# Patient Record
Sex: FEMALE | Race: OTHER | HISPANIC OR LATINO
[De-identification: names, ages, dates, MRNs, and addresses within clinical notes are randomized per-mention and may not be internally consistent; named-entity substitution may affect disease eponyms.]

---

## 2017-03-27 ENCOUNTER — APPOINTMENT (OUTPATIENT)
Dept: PULMONOLOGY | Facility: CLINIC | Age: 27
End: 2017-03-27

## 2017-03-27 VITALS
OXYGEN SATURATION: 98 % | WEIGHT: 110 LBS | DIASTOLIC BLOOD PRESSURE: 70 MMHG | SYSTOLIC BLOOD PRESSURE: 106 MMHG | RESPIRATION RATE: 18 BRPM | HEART RATE: 80 BPM | BODY MASS INDEX: 20.77 KG/M2 | HEIGHT: 61 IN

## 2017-03-27 DIAGNOSIS — Z83.3 FAMILY HISTORY OF DIABETES MELLITUS: ICD-10-CM

## 2017-03-27 DIAGNOSIS — Z87.09 PERSONAL HISTORY OF OTHER DISEASES OF THE RESPIRATORY SYSTEM: ICD-10-CM

## 2017-03-27 DIAGNOSIS — Z83.49 FAMILY HISTORY OF OTHER ENDOCRINE, NUTRITIONAL AND METABOLIC DISEASES: ICD-10-CM

## 2017-03-27 DIAGNOSIS — Z82.5 FAMILY HISTORY OF ASTHMA AND OTHER CHRONIC LOWER RESPIRATORY DISEASES: ICD-10-CM

## 2017-03-28 PROBLEM — Z83.3 FAMILY HISTORY OF DIABETES MELLITUS: Status: ACTIVE | Noted: 2017-03-28

## 2017-03-28 PROBLEM — Z87.09 HISTORY OF BRONCHITIS: Status: RESOLVED | Noted: 2017-03-28 | Resolved: 2017-03-28

## 2017-03-28 PROBLEM — Z82.5 FAMILY HISTORY OF ASTHMA: Status: ACTIVE | Noted: 2017-03-28

## 2017-03-28 PROBLEM — Z87.09 HISTORY OF ASTHMA: Status: RESOLVED | Noted: 2017-03-28 | Resolved: 2017-03-28

## 2017-03-28 PROBLEM — Z83.49 FAMILY HISTORY OF THYROID DISEASE: Status: ACTIVE | Noted: 2017-03-28

## 2017-03-28 RX ORDER — LEVALBUTEROL HYDROCHLORIDE 0.63 MG/3ML
0.63 SOLUTION RESPIRATORY (INHALATION)
Qty: 72 | Refills: 0 | Status: COMPLETED | COMMUNITY
Start: 2016-11-10

## 2017-03-28 RX ORDER — TRETINOIN 0.25 MG/G
0.03 CREAM TOPICAL
Qty: 45 | Refills: 0 | Status: COMPLETED | COMMUNITY
Start: 2016-04-15

## 2017-03-28 RX ORDER — PREDNISONE 10 MG/1
10 TABLET ORAL
Qty: 7 | Refills: 0 | Status: COMPLETED | COMMUNITY
Start: 2016-11-02

## 2017-03-28 RX ORDER — ACETAMINOPHEN AND CODEINE PHOSPHATE 120; 12 MG/5ML; MG/5ML
120-12 SOLUTION ORAL
Qty: 150 | Refills: 0 | Status: COMPLETED | COMMUNITY
Start: 2016-11-10

## 2017-03-28 RX ORDER — AZITHROMYCIN 250 MG/1
250 TABLET, FILM COATED ORAL
Qty: 6 | Refills: 0 | Status: COMPLETED | COMMUNITY
Start: 2017-01-03

## 2017-03-28 RX ORDER — BENZONATATE 200 MG/1
200 CAPSULE ORAL
Qty: 30 | Refills: 0 | Status: COMPLETED | COMMUNITY
Start: 2016-11-10

## 2017-03-28 RX ORDER — PREDNISONE 10 MG/1
10 TABLET ORAL DAILY
Qty: 100 | Refills: 3 | Status: COMPLETED | COMMUNITY
Start: 2017-03-27 | End: 2017-03-28

## 2017-03-28 RX ORDER — LEVALBUTEROL TARTRATE 45 UG/1
45 AEROSOL, METERED ORAL
Qty: 15 | Refills: 0 | Status: COMPLETED | COMMUNITY
Start: 2016-01-12

## 2017-03-28 RX ORDER — BENZONATATE 100 MG/1
100 CAPSULE ORAL
Qty: 21 | Refills: 0 | Status: COMPLETED | COMMUNITY
Start: 2017-01-03

## 2017-04-08 RX ORDER — ETONOGESTREL AND ETHINYL ESTRADIOL .12; .015 MG/D; MG/D
0.12-0.015 INSERT, EXTENDED RELEASE VAGINAL
Qty: 1 | Refills: 0 | Status: DISCONTINUED | COMMUNITY
Start: 2016-09-15

## 2017-04-10 ENCOUNTER — APPOINTMENT (OUTPATIENT)
Dept: PULMONOLOGY | Facility: CLINIC | Age: 27
End: 2017-04-10
Payer: COMMERCIAL

## 2017-04-10 VITALS
HEIGHT: 61 IN | WEIGHT: 107 LBS | SYSTOLIC BLOOD PRESSURE: 102 MMHG | RESPIRATION RATE: 16 BRPM | BODY MASS INDEX: 20.2 KG/M2 | DIASTOLIC BLOOD PRESSURE: 72 MMHG | OXYGEN SATURATION: 95 % | HEART RATE: 98 BPM

## 2017-04-10 PROCEDURE — 99214 OFFICE O/P EST MOD 30 MIN: CPT

## 2017-06-07 ENCOUNTER — RX RENEWAL (OUTPATIENT)
Age: 27
End: 2017-06-07

## 2017-09-27 ENCOUNTER — RX RENEWAL (OUTPATIENT)
Age: 27
End: 2017-09-27

## 2017-09-27 DIAGNOSIS — Z87.898 PERSONAL HISTORY OF OTHER SPECIFIED CONDITIONS: ICD-10-CM

## 2017-11-06 ENCOUNTER — RX RENEWAL (OUTPATIENT)
Age: 27
End: 2017-11-06

## 2017-11-06 DIAGNOSIS — L70.9 ACNE, UNSPECIFIED: ICD-10-CM

## 2017-12-02 DIAGNOSIS — H60.90 UNSPECIFIED OTITIS EXTERNA, UNSPECIFIED EAR: ICD-10-CM

## 2018-01-07 RX ORDER — HYDROCODONE BITARTRATE AND HOMATROPINE METHYLBROMIDE 5; 1.5 MG/5ML; MG/5ML
5-1.5 SYRUP ORAL EVERY 4 HOURS
Qty: 200 | Refills: 0 | Status: DISCONTINUED | COMMUNITY
Start: 2017-04-10 | End: 2018-01-07

## 2018-03-19 ENCOUNTER — APPOINTMENT (OUTPATIENT)
Dept: PULMONOLOGY | Facility: CLINIC | Age: 28
End: 2018-03-19
Payer: COMMERCIAL

## 2018-03-19 VITALS
BODY MASS INDEX: 20.96 KG/M2 | WEIGHT: 111 LBS | RESPIRATION RATE: 16 BRPM | OXYGEN SATURATION: 99 % | HEIGHT: 61 IN | SYSTOLIC BLOOD PRESSURE: 116 MMHG | DIASTOLIC BLOOD PRESSURE: 72 MMHG

## 2018-03-19 PROCEDURE — 94729 DIFFUSING CAPACITY: CPT

## 2018-03-19 PROCEDURE — 99214 OFFICE O/P EST MOD 30 MIN: CPT | Mod: 25

## 2018-03-19 PROCEDURE — 94727 GAS DIL/WSHOT DETER LNG VOL: CPT

## 2018-03-19 PROCEDURE — 94060 EVALUATION OF WHEEZING: CPT

## 2018-03-19 RX ORDER — PREDNISONE 10 MG/1
10 TABLET ORAL DAILY
Qty: 100 | Refills: 3 | Status: COMPLETED | COMMUNITY
Start: 2017-04-10 | End: 2018-03-19

## 2018-03-19 RX ORDER — CIPROFLOXACIN AND DEXAMETHASONE 3; 1 MG/ML; MG/ML
0.3-0.1 SUSPENSION/ DROPS AURICULAR (OTIC) TWICE DAILY
Qty: 1 | Refills: 0 | Status: COMPLETED | COMMUNITY
Start: 2017-12-02 | End: 2018-03-19

## 2018-08-16 ENCOUNTER — APPOINTMENT (OUTPATIENT)
Dept: PULMONOLOGY | Facility: CLINIC | Age: 28
End: 2018-08-16
Payer: COMMERCIAL

## 2018-08-16 VITALS
BODY MASS INDEX: 20.03 KG/M2 | OXYGEN SATURATION: 97 % | HEART RATE: 74 BPM | RESPIRATION RATE: 14 BRPM | SYSTOLIC BLOOD PRESSURE: 110 MMHG | TEMPERATURE: 98.7 F | DIASTOLIC BLOOD PRESSURE: 70 MMHG | WEIGHT: 106 LBS

## 2018-08-16 PROCEDURE — 99213 OFFICE O/P EST LOW 20 MIN: CPT

## 2018-08-16 RX ORDER — DAPSONE 50 MG/G
5 GEL TOPICAL
Qty: 60 | Refills: 0 | Status: COMPLETED | COMMUNITY
Start: 2018-04-04

## 2018-08-16 RX ORDER — ALBUTEROL SULFATE 2.5 MG/3ML
(2.5 MG/3ML) SOLUTION RESPIRATORY (INHALATION)
Qty: 75 | Refills: 0 | Status: DISCONTINUED | COMMUNITY
Start: 2017-01-03 | End: 2018-08-16

## 2018-08-16 RX ORDER — DESONIDE 0.5 MG/G
0.05 CREAM TOPICAL
Qty: 60 | Refills: 0 | Status: COMPLETED | COMMUNITY
Start: 2018-04-04

## 2018-10-01 ENCOUNTER — RX RENEWAL (OUTPATIENT)
Age: 28
End: 2018-10-01

## 2018-11-06 ENCOUNTER — APPOINTMENT (OUTPATIENT)
Dept: PULMONOLOGY | Facility: CLINIC | Age: 28
End: 2018-11-06
Payer: COMMERCIAL

## 2018-11-06 PROCEDURE — 36415 COLL VENOUS BLD VENIPUNCTURE: CPT

## 2018-11-06 RX ORDER — CIPROFLOXACIN AND DEXAMETHASONE 3; 1 MG/ML; MG/ML
0.3-0.1 SUSPENSION/ DROPS AURICULAR (OTIC) TWICE DAILY
Qty: 1 | Refills: 2 | Status: DISCONTINUED | COMMUNITY
Start: 2018-09-08 | End: 2018-11-06

## 2018-11-07 LAB
HAV IGM SER QL: NONREACTIVE
HBV SURFACE AG SER QL: NONREACTIVE
HCV AB SER QL: NONREACTIVE
HCV S/CO RATIO: 0.15 S/CO

## 2018-11-08 LAB
MEV IGM SER QL: NEGATIVE
RUBV IGM FLD-ACNC: <20 AU/ML
VZV IGM SER IF-ACNC: <0.91 INDEX

## 2018-11-11 LAB — MUV IGM SER QL IA: <0.8 AU

## 2018-12-16 ENCOUNTER — RX RENEWAL (OUTPATIENT)
Age: 28
End: 2018-12-16

## 2018-12-20 ENCOUNTER — RX RENEWAL (OUTPATIENT)
Age: 28
End: 2018-12-20

## 2019-02-04 ENCOUNTER — OTHER (OUTPATIENT)
Age: 29
End: 2019-02-04

## 2019-02-04 VITALS
DIASTOLIC BLOOD PRESSURE: 64 MMHG | OXYGEN SATURATION: 97 % | HEART RATE: 76 BPM | TEMPERATURE: 98.3 F | SYSTOLIC BLOOD PRESSURE: 114 MMHG

## 2019-05-13 ENCOUNTER — CLINICAL ADVICE (OUTPATIENT)
Age: 29
End: 2019-05-13

## 2019-05-13 DIAGNOSIS — J32.9 CHRONIC SINUSITIS, UNSPECIFIED: ICD-10-CM

## 2019-05-24 ENCOUNTER — RX RENEWAL (OUTPATIENT)
Age: 29
End: 2019-05-24

## 2019-06-10 ENCOUNTER — APPOINTMENT (OUTPATIENT)
Dept: PULMONOLOGY | Facility: CLINIC | Age: 29
End: 2019-06-10
Payer: COMMERCIAL

## 2019-06-10 VITALS
BODY MASS INDEX: 20.78 KG/M2 | TEMPERATURE: 98.1 F | RESPIRATION RATE: 12 BRPM | WEIGHT: 110 LBS | DIASTOLIC BLOOD PRESSURE: 72 MMHG | SYSTOLIC BLOOD PRESSURE: 110 MMHG | HEART RATE: 80 BPM | OXYGEN SATURATION: 99 %

## 2019-06-10 PROCEDURE — 99214 OFFICE O/P EST MOD 30 MIN: CPT

## 2019-06-10 NOTE — DISCUSSION/SUMMARY
[FreeTextEntry1] : 29 y/o woman with h/o asthma which is not active now. \par \par Was hospitalized at [facility] on June 8, 2019 for fever, nausea, vomiting and RLQ abdominal pain. CT showed diverticulosis. She also had NEHA which resolved with hydration. Presumed to have diverticulitis although this was not specifically noted on CT of the abdomen. Treated with  broad spectrum IV antibiotics. \par \par Now c/o pain in her throat and painful swallowing. Has marked pharyngeal erythema on examination. Appears to have early thrush. Given clotrimazole oral lozenges. Blood work to be repeated later this week. To see GI as well.  
No

## 2019-06-10 NOTE — REVIEW OF SYSTEMS
[Fatigue] : fatigue [Poor Appetite] : poor appetite [Nasal Congestion] : nasal congestion [Sore Throat] : sore throat [Headache] : headache [Difficulty Maintaining Sleep] : difficulty maintaining sleep [Dysphagia] : dysphagia [Fever] : no fever [Chills] : no chills [Postnasal Drip] : no postnasal drip [Epistaxis] : no nosebleeds [Ear Disturbance] : no ear disturbance [Sputum] : not coughing up ~M sputum [Cough] : no cough [Dyspnea] : no dyspnea [Wheezing] : no wheezing [Chest Discomfort] : no chest discomfort [Edema] : ~T edema was not present [Nasal Discharge] : no nasal discharge [Reflux] : no reflux [Nausea] : no nausea [Vomiting] : no vomiting [Diarrhea] : no diarrhea [Myalgias] : no myalgias [Rash] : no [unfilled] rash [Anemia] : no anemia [Dizziness] : no dizziness [Depression] : no depression [Confusion] : no confusion [Syncope] : no fainting [Thyroid Problem] : no thyroid problem [Diabetes] : no diabetes mellitus [DVT] : no DVT [Difficulty Initiating Sleep] : no difficulty falling asleep

## 2019-06-10 NOTE — PHYSICAL EXAM
Mother reports father was giving patient a bath earlier this evening when patient swallowed an undetermined amount of water. Mother reports father told her pt appeared to be hiccupping and gasping for air after he swallowed the water. Mother denies vomiting or changes in behavior, reports pt is tired but acting normally.    Awake, alert and aware of environment with age appropriate behavior.No acute distress noted. Skin is warm and dry with normal color. Airway is open and patent, respirations are spontaneous, unlabored with normal rate and effort.Abdomen is soft and non distended. Patient is moving all extremities spontaneously. . No obvious musculoskeletal deformities noted.     [General Appearance - Well Developed] : well developed [Normal Appearance] : normal appearance [General Appearance - In No Acute Distress] : no acute distress [Heart Sounds] : normal S1 and S2 [Auscultation Breath Sounds / Voice Sounds] : lungs were clear to auscultation bilaterally [Cyanosis, Localized] : no localized cyanosis [No Focal Deficits] : no focal deficits [] : no rash [Skin Turgor] : normal skin turgor [Oriented To Time, Place, And Person] : oriented to person, place, and time [Impaired Insight] : insight and judgment were intact [Affect] : the affect was normal [Mood] : the mood was normal [Neck Cervical Mass (___cm)] : no neck mass was observed [Abdomen Soft] : soft [Abdomen Tenderness] : non-tender [Bowel Sounds] : normal bowel sounds [Abnormal Walk] : normal gait [FreeTextEntry1] : No cervical adenopathy.

## 2019-06-10 NOTE — HISTORY OF PRESENT ILLNESS
[FreeTextEntry1] : She was admitted to [facility] last week for fever, RLQ pain, nausea, vomiting and loose BM's, Was noted to have NEHA.  Presumed to have diverticulitis or possible food poisoning.  Was treated with antibiotics and IV fluids. \par \par C/O pain in her throat and painful swallowing for the past two days. No fever. No neck pain. \par

## 2019-06-11 ENCOUNTER — LABORATORY RESULT (OUTPATIENT)
Age: 29
End: 2019-06-11

## 2019-06-11 ENCOUNTER — APPOINTMENT (OUTPATIENT)
Dept: INTERNAL MEDICINE | Facility: CLINIC | Age: 29
End: 2019-06-11
Payer: COMMERCIAL

## 2019-06-11 VITALS
DIASTOLIC BLOOD PRESSURE: 69 MMHG | BODY MASS INDEX: 19.63 KG/M2 | OXYGEN SATURATION: 97 % | HEART RATE: 76 BPM | WEIGHT: 104 LBS | HEIGHT: 61 IN | SYSTOLIC BLOOD PRESSURE: 114 MMHG | TEMPERATURE: 98 F

## 2019-06-11 DIAGNOSIS — B37.0 CANDIDAL STOMATITIS: ICD-10-CM

## 2019-06-11 DIAGNOSIS — Z92.89 PERSONAL HISTORY OF OTHER MEDICAL TREATMENT: ICD-10-CM

## 2019-06-11 DIAGNOSIS — Z80.7 FAMILY HISTORY OF OTHER MALIGNANT NEOPLASMS OF LYMPHOID, HEMATOPOIETIC AND RELATED TISSUES: ICD-10-CM

## 2019-06-11 PROCEDURE — 99204 OFFICE O/P NEW MOD 45 MIN: CPT

## 2019-06-11 RX ORDER — METOCLOPRAMIDE 10 MG/1
10 TABLET ORAL
Qty: 60 | Refills: 0 | Status: COMPLETED | COMMUNITY
Start: 2019-06-04 | End: 2019-06-11

## 2019-06-11 RX ORDER — SCOPOLAMINE 1.5 MG/1
1 PATCH, EXTENDED RELEASE TRANSDERMAL
Qty: 4 | Refills: 2 | Status: COMPLETED | COMMUNITY
Start: 2017-09-27 | End: 2019-06-11

## 2019-06-11 RX ORDER — TRETINOIN 0.5 MG/G
0.05 CREAM TOPICAL DAILY
Qty: 1 | Refills: 1 | Status: COMPLETED | COMMUNITY
Start: 2017-11-06 | End: 2019-06-11

## 2019-06-11 NOTE — HISTORY OF PRESENT ILLNESS
[Nausea] : denies nausea [Heartburn] : denies heartburn [Constipation] : denies constipation [Vomiting] : denies vomiting [Yellow Skin Or Eyes (Jaundice)] : denies jaundice [Abdominal Swelling] : denies abdominal swelling [Rectal Pain] : denies rectal pain [Wt Loss ___ Lbs] : recent [unfilled] ~Upound(s) weight loss [Abdominal Pain] : abdominal pain [Diarrhea] : diarrhea [Diverticulitis] : diverticulitis [Good Compliance] : good compliance with treatment [Wt Gain ___ Lbs] : no recent weight gain [GERD] : no gastroesophageal reflux disease [Peptic Ulcer Disease] : no peptic ulcer disease [Hiatus Hernia] : no hiatus hernia [Kidney Stone] : no kidney stone [Pancreatitis] : no pancreatitis [Cholelithiasis] : no cholelithiasis [Alcohol Abuse] : no alcohol abuse [Irritable Bowel Syndrome] : no irritable bowel syndrome [Inflammatory Bowel Disease] : no inflammatory bowel disease [Abdominal Surgery] : no abdominal surgery [Malignancy] : no malignancy [Appendectomy] : no appendectomy [Cholecystectomy] : no cholecystectomy [de-identified] : Pt is a 28 yr old woman who was recently admitted to hospital due to acute diverticulitis and acute kidney injury due to  dehydration.   Pt states she went to er due to right lower quadrant pain  on sunday and when it didn’t resolved and became worse she went to er.  She had ct of abd and pelvis  and found to have  liver upper limits of normal and possible sludge in gallbladder and mild bladder wall thickening  minimal haziness of fat anteriorly in mid portion of pelvis  and minimal thickening of fascial planes along posterior aspect of midportion of right paracolic gutter  findings likely inflammatory  possible diverticulitis  or gyn origin,.  such as ruptured ovarian cyst.  pid.   she has sigmoid diverticulosis  moderate and scattered through colon.  She has subpleural nodular opacities at lung bases  largest 3-4mm  she had pelvic sonogram and fluid in [place] she was admitted and stayed for 4 days and was given iv anti biotics and discharged on amoxicillin , flagyl protonix and zofran and when she went to her pcp  she was found to have thrush and  given troches Mycostatin .  She is still on antibiotics.  She doesn’t have abd pain today.  She has watery bowel movement and pasty but better than what it was  when she first went to hospital  She states she developed abd pain first and watery diarrhea  and continued to have diarrhea  non bloody and would have a bm after eating  5 times a day and then it became like water  and started to vomit. she states she has not been on antibiotics prior to this .  Its been over one year.  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  She never was tested for  stool.. She doesn’t have fever or chills.  she has not recently traveled outside the [place].   no other person was ill in her home who ate same food. She did eat cheese which no other person ate.

## 2019-06-11 NOTE — CONSULT LETTER
[Dear  ___] : Dear  [unfilled], [Consult Letter:] : I had the pleasure of evaluating your patient, [unfilled]. [Please see my note below.] : Please see my note below. [( Thank you for referring [unfilled] for consultation for _____ )] : Thank you for referring [unfilled] for consultation for [unfilled] [Consult Closing:] : Thank you very much for allowing me to participate in the care of this patient.  If you have any questions, please do not hesitate to contact me. [Sincerely,] : Sincerely, [FreeTextEntry3] : Gaurav Ross MDFACP

## 2019-06-11 NOTE — ASSESSMENT
[FreeTextEntry1] : diarrhea She had sudden episode of diarrhea and then vomiting and developed laurie due to dehydration.  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  she states her dad had hpylori and was treated and didn’t develop diarrhea  and although hpylori can cause colitis it doesn’t cause an acute episode of diarrhea . She is on antibiotics presently  and it will not be tested for .  She will start probiotic now and take at a different time with her antibiotic.  She developed this due to her  antibiotics.  she was placed on protonix  along with antibiotics can increase her risks of c diff if she is negative now.   I would stoop it especially with her renal insuff and acute injury and was given due to her vomiting.  I will start her on vsl #3 for her diarrhea.  Although ct didn’t show evidence of colitis   Crohns and uc could be considered but again unlikely due to the acuteness of her illness.  Viral gastroenteritis is also considered. thrush she will continue troches  lung nodules were seen on abd ct and should be followed and investigated further and will leave this to her pcp.   Aisha will follow a low residue diet for now. and will be given diet.  she is scheduled for colonoscopy on 9/6/19 when her inflammation should be resolved if it was due to diverticulitis.

## 2019-06-11 NOTE — OB HISTORY
[Definite:  ___ (Date)] : the last menstrual period was [unfilled] [Regular Cycle Intervals] : periods have been regular [___] : Total Pregnancies: [unfilled] [Menarche Age: ____] : age at menarche was [unfilled]

## 2019-06-11 NOTE — PHYSICAL EXAM
[Well Developed] : well developed [Well Nourished] : well nourished [Conjunctiva] : the conjunctiva were normal in both eyes [EOM Intact] : extraocular movements were intact [PERRL] : pupils were equal in size, round, and reactive to light [Normal Right Eye] : Right eye: normal [Normal Left Eye] : Left eye: normal [Normal Right Ear] : Right ear: the external ear, canal and tympanic membrane were normal [Normal Nose] : Nose: the external nose, nasal mucosa, and septum were normal [Normal Left Ear] : Left ear: the external ear, canal and tympanic membrane were normal [Normal Oropharynx] : Oropharynx: the oral mucosa, hard and soft palates, tongue, tonsils, and posterior pharynx were normal [Normal Lips/Teeth/Gums] : Lips, teeth and gums were normal [Normal Nasopharynx] : Nasopharynx: the nasal turbinates, mucosa, adenoids, eustachian tubes were normal [Normal Neck] : Neck: Supple, no masses or thyromegaly [Normal Larynx] : Larynx: the epiglottis and vocal cords were normal [Neck Supple] : was supple [Normal Appearance] : was normal in appearance [Rate ___] : at [unfilled] breaths per minute [Normal Rhythm/Effort] : normal respiratory rhythm and effort [Clear Bilaterally] : the lungs were clear to auscultation bilaterally [Normal to Percussion] : the lungs were normal to percussion [Normal Rate] : normal [5th Left ICS - MCL] : palpated at the 5th LICS in the midclavicular line [Normal S2] : normal S2 [Normal S1] : normal S1 [II] : a grade 2 [No Pitting Edema] : no pitting edema present [No Abnormalities] : the abdominal aorta was not enlarged and no bruit was heard [2+] : right 2+ [Examination Of The Breasts] : a normal appearance [Flat] : flat [Soft, Nontender] : the abdomen was soft and nontender [No HSM] : no hepatosplenomegaly noted [No Mass] : no masses were palpated [None] : no hernias were palpable [Normal Kyphosis] : normal kyphosis [No Visual Abnormalities] : no visible abnormalities [Normal Lordosis] : normal lordosis [No Scoliosis] : no scoliosis [No Tenderness to Palpation] : no spine tenderness on palpation [No Masses] : no masses [Full ROM] : full ROM [No Pain with ROM] : no pain with motion in any direction [Intact] : all reflexes within normal limits bilaterally [Normal Station and Gait] : the gait and station were normal [Normal Motor Tone] : the muscle tone was normal [Involuntary Movements] : no involuntary movements were seen [Normal Scalp] : inspection of the scalp showed no abnormalities [Examination Of The Hair] : texture and distribution of hair was normal [Complexion Medium] : medium complexion [Normal] : the deep tendon reflexes were normal [Appropriate] : appropriate [Normal Mental Status] : the patient's orientation, memory, attention, language and fund of knowledge were normal [Enlarged Diffusely] : was not enlarged [S3] : no S3 [S4] : no S4 [Rt] : no varicose veins of the right leg [Lt] : no varicose veins of the left leg [Right Carotid Bruit] : no bruit heard over the right carotid [Right Femoral Bruit] : no bruit heard over the right femoral artery [Left Carotid Bruit] : no bruit heard over the left carotid [Left Femoral Bruit] : no bruit heard over the left femoral artery [FreeTextEntry1] : examined sitting and supine.   [Bruit] : no bruit heard [Rebound] : no rebound [Guarding] : no guarding [Postauricular Lymph Nodes Enlarged Bilaterally] : nodes not enlarged [Suboccipital Lymph Nodes Enlarged Bilaterally] : nodes not enlarged [Preauricular Lymph Nodes Enlarged Bilaterally] : nodes not enlarged [Submandibular Lymph Nodes Enlarged Bilaterally] : nodes not enlarged [Supraclavicular Lymph Nodes Enlarged Bilaterally] : nodes not enlarged [Cervical Lymph Nodes Enlarged Anterior Bilaterally] : nodes not enlarged [Submental Lymph Nodes Enlarged] : nodes not enlarged [Cervical Lymph Nodes Enlarged Posterior Bilaterally] : nodes not enlarged [Axillary Lymph Nodes Enlarged Bilaterally] : nodes not enlarged [Epitrochlear Lymph Nodes Enlarged Bilaterally] : nodes not enlarged [Femoral Lymph Nodes Enlarged Bilaterally] : nodes not enlarged [Abnormal Color] : normal color and pigmentation [Inguinal Lymph Nodes Enlarged Bilaterally] : nodes not enlarged [Skin Turgor Decreased] : normal skin turgor [Impaired judgment] : intact judgment [Skin Lesions 1] : no skin lesions were observed [Impaired Insight] : intact insight

## 2019-06-14 LAB
25(OH)D3 SERPL-MCNC: 16.1 NG/ML
ALBUMIN SERPL ELPH-MCNC: 4.2 G/DL
ALP BLD-CCNC: 60 U/L
ALT SERPL-CCNC: 46 U/L
ANION GAP SERPL CALC-SCNC: 16 MMOL/L
AST SERPL-CCNC: 56 U/L
BAKER'S YEAST AB QL: 30.8 UNITS
BAKER'S YEAST IGA QL IA: 77 UNITS
BAKER'S YEAST IGA QN IA: POSITIVE
BAKER'S YEAST IGG QN IA: ABNORMAL
BASOPHILS # BLD AUTO: 0.02 K/UL
BASOPHILS NFR BLD AUTO: 0.4 %
BILIRUB SERPL-MCNC: 0.2 MG/DL
BUN SERPL-MCNC: 4 MG/DL
CALCIUM SERPL-MCNC: 9.1 MG/DL
CHLORIDE SERPL-SCNC: 101 MMOL/L
CO2 SERPL-SCNC: 29 MMOL/L
CREAT SERPL-MCNC: 0.78 MG/DL
CRP SERPL-MCNC: 0.66 MG/DL
EOSINOPHIL # BLD AUTO: 0.43 K/UL
EOSINOPHIL NFR BLD AUTO: 9.6 %
ERYTHROCYTE [SEDIMENTATION RATE] IN BLOOD BY WESTERGREN METHOD: 17 MM/HR
GLUCOSE SERPL-MCNC: 84 MG/DL
HCT VFR BLD CALC: 42.8 %
HGB BLD-MCNC: 13.6 G/DL
IGA SER QL IEP: 162 MG/DL
IMM GRANULOCYTES NFR BLD AUTO: 0.2 %
LYMPHOCYTES # BLD AUTO: 1.31 K/UL
LYMPHOCYTES NFR BLD AUTO: 29.4 %
MAGNESIUM SERPL-MCNC: 1.7 MG/DL
MAN DIFF?: NORMAL
MCHC RBC-ENTMCNC: 28.9 PG
MCHC RBC-ENTMCNC: 31.8 GM/DL
MCV RBC AUTO: 90.9 FL
MONOCYTES # BLD AUTO: 0.62 K/UL
MONOCYTES NFR BLD AUTO: 13.9 %
MPO AB + PR3 PNL SER: NORMAL
NEUTROPHILS # BLD AUTO: 2.07 K/UL
NEUTROPHILS NFR BLD AUTO: 46.5 %
PLATELET # BLD AUTO: 220 K/UL
POTASSIUM SERPL-SCNC: 3.3 MMOL/L
PROT SERPL-MCNC: 6.9 G/DL
RBC # BLD: 4.71 M/UL
RBC # FLD: 13.3 %
SODIUM SERPL-SCNC: 146 MMOL/L
STRONGYLOIDES AB SER IA-ACNC: POSITIVE
TSH SERPL-ACNC: 0.72 UIU/ML
TTG IGA SER IA-ACNC: <1.2 U/ML
TTG IGA SER-ACNC: NEGATIVE
TTG IGG SER IA-ACNC: 1.9 U/ML
TTG IGG SER IA-ACNC: NEGATIVE
WBC # FLD AUTO: 4.46 K/UL

## 2019-06-24 ENCOUNTER — APPOINTMENT (OUTPATIENT)
Dept: INTERNAL MEDICINE | Facility: CLINIC | Age: 29
End: 2019-06-24
Payer: COMMERCIAL

## 2019-06-24 VITALS
WEIGHT: 103 LBS | HEART RATE: 87 BPM | HEIGHT: 61 IN | SYSTOLIC BLOOD PRESSURE: 108 MMHG | OXYGEN SATURATION: 98 % | BODY MASS INDEX: 19.45 KG/M2 | DIASTOLIC BLOOD PRESSURE: 75 MMHG

## 2019-06-24 PROCEDURE — 99214 OFFICE O/P EST MOD 30 MIN: CPT

## 2019-06-24 RX ORDER — METRONIDAZOLE 500 MG/1
500 TABLET, FILM COATED ORAL 3 TIMES DAILY
Qty: 12 | Refills: 0 | Status: COMPLETED | COMMUNITY
Start: 2019-06-11 | End: 2019-06-24

## 2019-06-24 RX ORDER — ONDANSETRON 4 MG/1
4 TABLET ORAL
Qty: 9 | Refills: 0 | Status: COMPLETED | COMMUNITY
Start: 2019-06-09

## 2019-06-24 RX ORDER — PANTOPRAZOLE 40 MG/1
40 TABLET, DELAYED RELEASE ORAL
Qty: 15 | Refills: 0 | Status: COMPLETED | COMMUNITY
Start: 2019-06-09

## 2019-06-24 RX ORDER — AMOXICILLIN 500 MG/1
500 CAPSULE ORAL
Qty: 30 | Refills: 0 | Status: COMPLETED | COMMUNITY
Start: 2019-02-04 | End: 2019-06-24

## 2019-06-24 RX ORDER — AMOXICILLIN AND CLAVULANATE POTASSIUM 875; 125 MG/1; MG/1
875-125 TABLET, COATED ORAL
Qty: 12 | Refills: 0 | Status: COMPLETED | COMMUNITY
Start: 2019-06-09

## 2019-06-24 NOTE — HISTORY OF PRESENT ILLNESS
[Heartburn] : denies heartburn [Nausea] : denies nausea [Vomiting] : denies vomiting [Diarrhea] : denies diarrhea [Constipation] : denies constipation [Yellow Skin Or Eyes (Jaundice)] : denies jaundice [Abdominal Pain] : denies abdominal pain [Abdominal Swelling] : denies abdominal swelling [Rectal Pain] : denies rectal pain [Wt Gain ___ Lbs] : no recent weight gain [Wt Loss ___ Lbs] : recent [unfilled] ~Upound(s) weight loss [GERD] : no gastroesophageal reflux disease [Good Compliance] : good compliance with treatment [de-identified] : Pt feels much better no diarrhea but has decreased appetite.  No abd pain or fever.  She didn’t collect c diff due to her bm is formed . She has one bm daily.  she has had asca and will repeat and suggested acute diverticulitis and will need colonoscopy in  6 weeks and will be treated for  strongyloids. She has high eosinophil count.  She has [place] to [place] discussed her results with her.

## 2019-06-24 NOTE — ASSESSMENT
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 She is 46.72 kg Strongyloidiasis is caused by infection with Strongyloides stercoralis. Manifestations of infection can range from asymptomatic eosinophilia in the immunocompetent host to disseminated disease with septic shock in the immunocompromised host.\par \par \par EPIDEMIOLOGY\par Strongyloidiasis is endemic in rural areas of tropical and subtropical regions and occurs sporadically in temperate areas ([place] and [place]) [1,2]. In tropical and subtropical regions, the overall regional prevalence may exceed 25 percent. The highest rates of infection in the [place] are among residents of the southeastern states [3,4] and among individuals who have been in endemic areas (including immigrants, refugees, travelers, and  personnel) [5,6]. Transmission of strongyloidiasis in long-term care settings has also been described [4,7].\par \par Immunosuppressed individuals are at risk for developing Strongyloides hyperinfection syndrome. In addition, solid organ transplant recipients receiving organs from donors with epidemiologic risk for latent Strongyloides infection are at risk for donor-derived strongyloidiasis [8,9]. (See 'Role of immunosuppression' below and 'Prevention' below.)\par \par \par LIFE CYCLE\par The infection begins when human skin contacts filariform larvae (the infective larval stage) of S. stercoralis, which are found in soil or other materials contaminated with human feces (figure 1). The filariform larvae penetrate the skin and migrate via the bloodstream and lymphatics to the lungs where they penetrate into the alveolar air sacs. The larvae then ascend the tracheobronchial tree and are swallowed.\par \par The larvae mature into adult worms that burrow into the mucosa of the duodenum and jejunum. Adult worms may live for up to five years. In the apparent absence of male adults, pathogenic adult females produce eggs, from which noninfectious larvae (rhabditiform larvae) develop within the lumen of the gastrointestinal tract. The rhabditiform larvae (picture 1) are generally passed in the feces. The cycle from dermal penetration to appearance of larvae in the stool requires approximately three to four weeks.\par \par Autoinfection — S. stercoralis can complete its life cycle entirely within the human host; this is contrast with all other helminthic parasites except Capillaria philippinensis [10]. As a result, the burden of adult worms in infected humans can increase substantially through a cycle of autoinfection. During autoinfection, the rhabditiform larvae mature into filariform larvae within the gastrointestinal tract. The filariform larvae can then penetrate the perianal skin or colonic mucosa to complete the cycle of autoinfection. Larval transformation within the gastrointestinal tract may also be accelerated by constipation, diverticula, other conditions that reduce bowel motility, and steroid use.\par \par Although autoinfection is limited by an intact immune response, a low level of autoinfection may permit the organism to persist for decades and cause clinical manifestations long after the initial infection [11]. This has been observed in prisoners of war who were found to be infected more than 40 years after exposure in [facility] [12]. However, in patients with depressed cell-mediated immunity, autoinfection may give rise to potentially fatal hyperinfection with disseminated disease [13,14]. (See 'Hyperinfection syndrome' below.)\par \par \par CLINICAL MANIFESTATIONS\par Most infected patients do not experience prominent symptoms. The most common manifestations are mild waxing and waning gastrointestinal, cutaneous, or pulmonary symptoms that persist for years; others simply have eosinophilia in the absence of symptoms.\par \par Eosinophilia is not universally present in strongyloidiasis but may be the only clue that the patient harbors a parasitic infection. One report, for example, evaluated 128 [facility] patients with eosinophilia, the cause of which was not apparent by routine screening [15]. Intestinal parasitism with one or more organisms was responsible for the eosinophilia in 122; hookworm (55 percent) and S. stercoralis (38 percent) were the most common organisms.\par \par However, eosinophilia may be suppressed or absent in disseminated disease because of concomitant pyogenic infection or steroid administration [16]. The serum immunoglobulin (Ig)E concentration is often elevated in these settings [17].\par \par Skin reactions — S. stercoralis may produce cutaneous reactions when larvae penetrate the skin, sometimes termed ground itch [18]. These reactions include inflammation, edema, petechiae, serpiginous or urticarial tracts, and severe pruritus. The feet are the most commonly affected site with initial infection, but dermal manifestations of primary infection rarely lead patients to seek medical attention.\par \par With chronic infection, urticaria or pruritus can develop. In addition, the dermal migration of larvae may produce a distinctive eruption at other sites, most commonly on the buttocks. As larvae migrate, a raised, evanescent pink track develops; these lesions are known as larva currens ("running" larva) and are pathognomonic of strongyloidiasis [19]. Other skin lesions in chronic strongyloidiasis may include periumbilical purpura in disseminated infections [20], nonpalpable purpura [21], angioedema [22,23], and erythroderma that mimics drug reaction [24].\par \par Gastrointestinal symptoms — The adult worms in the small bowel induce duodenitis, which can lead to upper abdominal pain [25,26]. Patients may also experience diarrhea, anorexia, nausea, and vomiting. Epigastric pain may mimic a duodenal ulcer, except that food ingestion may aggravate the pain of strongyloidiasis. Chronic enterocolitis and malabsorption can result from a high intestinal worm burden.\par \par Pulmonary manifestations — The transpulmonary migration of larvae can produce dry cough, throat irritation, dyspnea, wheezing, and hemoptysis. A Sheri's-like syndrome with eosinophilia is rarely observed.\par \par Some patients with chronic strongyloidiasis experience repeated episodes of fever and mild pneumonitis, producing a picture that resembles recurrent bacterial pneumonia. Eosinophilia is often absent initially but may develop later in the infection. Treatment of the strongyloidiasis terminates the episodes of pneumonia.\par \par Patients with chronic strongyloidiasis may also develop asthma that paradoxically worsens with corticosteroid use [27,28] or dyspnea due to restrictive pulmonary disease [29]. Strongyloidiasis can present as acute respiratory failure and gram-negative sepsis or pulmonary embolism [30].\par \par Hyperinfection syndrome — The cycle of autoinfection can lead to the hyperinfection syndrome by greatly increasing the parasite burden. Autoinfection within the gastrointestinal tract begins when rhabditiform larvae transform into filariform larvae, which penetrate the intestinal wall to enter the bloodstream. The massive dissemination of filariform larvae to the lungs, liver, heart, central nervous system, and endocrine glands induces inflammation that may result in symptomatic dysfunction of these organs and even septic shock [13,31-33]. (See 'Life cycle' above.)\par \par The most common manifestations of the hyperinfection syndrome include [25,31,34]:\par \par ?Fever\par \par ?Nausea and vomiting\par \par ?Anorexia\par \par ?Diarrhea\par \par ?Abdominal pain\par \par ?Dyspnea\par \par ?Wheezing\par \par ?Hemoptysis\par \par ?Cough\par \par \par The chest radiograph reveals pulmonary infiltrates that consist of foci of hemorrhage, pneumonitis, and edema (image 1) [35]. Rarely, adult parasites localize in the bronchial tree and lay eggs that develop into larvae in the airway. Bronchospasm frequently accompanies this manifestation of hyperinfection [36].\par \par The clinical findings in the hyperinfection syndrome may be attributable to the direct consequences of organ invasion by the filariform larvae or to secondary gram-negative bacteremia, pneumonia, or even meningitis due to bloodstream seeding originating from the gastrointestinal tract or lungs [37]. Eosinophilia is often absent when complications such as gram-negative bacteremia ensue [35]. Mortality rates of 10 to >80 percent have been reported in the hyperinfection syndrome. (See 'Whom to test' below.)\par \par Role of immunosuppression — The likelihood of developing the hyperinfection syndrome is increased if cell-mediated immunity is impaired by congenital immunodeficiency, underlying malignancy, malnutrition, alcoholism, hematopoietic stem cell transplantation (HSCT), or the administration of corticosteroids or cytotoxic drugs [13,38-50]. Even short courses of corticosteroids of 6 to 17 days have led to overwhelming hyperinfection and death [51]. The pathophysiology underlying these risk factors, whether disease related or iatrogenically induced, is a compromised immune system. It is therefore important to detect and eradicate Strongyloides infection prior to initiation of immunosuppressive therapy. (See 'Whom to test' below.)\par \par In comparison with steroids and cytotoxic agents, cyclosporine has activity against Strongyloides [52]. It is unknown, however, whether this effect is sufficient to reduce the risk of hyperinfection in patients receiving cyclosporine.\par \par Underlying human T-lymphotropic virus type I (HTLV-I) infection is a significant risk factor for disseminated strongyloidiasis and treatment failure [53-55]. Patients with HTLV-I infection have high levels of interferon-gamma production, which decreases the production of interleukin (IL)-4, IL-5, IL-13, and IgE, important molecules in host defense against Strongyloides [17,56,57]. Regulatory T cells may play a role in susceptibility to Strongyloides hyperinfection; among patients with HTLV-I and Strongyloides coinfection, regulatory T cell counts are increased and correlate with both low circulating eosinophil counts and reduced antigen-driven IL-5 production [58].\par \par Disseminated strongyloidiasis can occur in patients with AIDS [59,60], but it occurs less frequently than in patients with HTLV-I and is much less common than might be predicted given the coendemicity of the two infections. However, immune reconstitution may be a risk factor for disseminated strongyloidiasis [61-63], and HIV-infected patients may be at risk for Strongyloides treatment failure [64].\par \par Other conditions or medications may be associated with an increased risk of strongyloidiasis:\par \par ?Hypogammaglobulinemia [65,66]\par \par ?Anti-tumor necrosis factor receptor therapy [67,68]\par \par ?Organ transplantation from an uninfected donor to an infected recipient with previously subclinical infection that is unmasked by immunosuppression [69-71]\par \par ?Organ transplantation from an infected donor to an uninfected recipient [50]\par \par \par \par WHOM TO TEST\par Testing for strongyloidiasis is appropriate for individuals in the following categories:\par \par ?Patients with clinical manifestations and epidemiologic exposure as described above (including unexplained eosinophilia, urticarial or serpiginous skin lesions, or pulmonary or gastrointestinal symptoms).\par \par \par ?Immunosuppressed patients (steroid and other immunosuppressive treatments, human T-lymphotropic virus type I infection, hematologic malignancy, malnutrition, AIDS) with unexplained eosinophilia, history of characteristic skin lesions, or epidemiologic exposure [16,72]. Transplant candidates should also be tested prior to immunosuppression if they have a potential exposure history [73,74].\par \par \par ?Asymptomatic individuals such as immigrants, refugees, long-term travelers, or  personnel who have been in areas known to be endemic for strongyloidiasis, even if their last exposure was decades prior [5,75-78].\par \par \par ?In endemic regions, patients with invasive infections caused by enteric organisms (especially systemic gram-negative bacterial infections) without an obvious cause [79,80].\par \par \par ?Transplant donors from endemic areas; detection of donor Strongyloides infection should prompt prophylaxis for recipients after transplantation [9,81]. (See 'Prevention' below.)\par \par \par \par DIAGNOSIS\par The diagnosis of uncomplicated strongyloidiasis is usually made by detecting rhabditiform larvae in concentrated stool or via serologic methods. As noted above, larvae first appear in the stool three to four weeks after initial dermal penetration (picture 1) [82].\par \par Stool studies — Standard stool examination is notoriously insensitive for detecting Strongyloides (<50 percent sensitivity) [83]. This is because larvae are excreted only intermittently, and many patients have a low burden of infectious organisms. Specialized tests on stool specimens, including the Baerman concentration technique, the Harada-Bear filter paper technique, and a modified agar plate method, can increase the yield, but even three or more stool examinations can fail to detect Strongyloides [84,85]. These specialized fecal examination techniques are rarely available in most diagnostic microbiology laboratories and have not been employed in most studies that report the efficacies of treatment regimens [86].\par \par Of the specialized techniques, the agar plate method is most sensitive [87]. It involves inoculating agar plates with stool and incubating for two days at room temperature. Larvae crawl on the agar and spread bacteria in their paths, creating bacterial growth patterns on the agar surface. Larvae can be seen by macroscopic examination of the plates and their presence confirmed with formalin washing of the plate surface and examination of the sediment from the washing [88].\par \par Aspiration of duodenojejunal fluid or the use of a string test (Enterotest) is sometimes used to detect Strongyloides larvae in patients with negative stool samples [82]. In disseminated strongyloidiasis, filariform larvae can be found in stool, sputum, bronchoalveolar lavage fluid, pleural fluid, peritoneal fluid, and surgical drainage fluid [43,89-93]. These specimens should also be examined for larvae if disseminated strongyloidiasis is suspected [91,93,94]. For patients with rash, larvae may be visualized by skin biopsy.\par \par Polymerase chain reaction (PCR) tests for detection of Strongyloides in stool samples are under development; thus far, they have been found to be more sensitive and more reliable in detection of S. stercoralis compared with parasitologic methods [95]. Improvements in extracting S. stercoralis DNA from fecal samples have enhanced the sensitivity of fecal PCR assays [96,97]. However, it is uncertain how long Strongyloides DNA will remain detectable in stool following definitive treatment.\par \par Serology — Diagnosis of strongyloidiasis by enzyme-linked immunosorbent assay (SAYDA) has proven useful in immunocompetent individuals, both in symptomatic and asymptomatic strongyloidiasis [98,99]. The SAYDA for detecting S. stercoralis infection detects immunoglobulin (Ig)G to filariform larvae. Negative test results in immunocompetent individuals decrease the likelihood that infection is present; however, some SAYDA serologies run by commercial laboratories are of variable reliability. In addition, SAYDA results can be falsely negative in immunocompromised hosts [94]. False-positive results may occur in the presence of other helminth infections [88].\par \par The SAYDA may be positive even when repeated examinations of stool samples have been unrevealing [100]. However, SAYDA can be falsely negative in immunocompromised hosts [94]. In addition, some SAYDA serologies run by commercial laboratories are of variable reliability.\par \par In one study, two commercially available ELISAs (IVD-SAYDA and Bordier-SAYDA) were found to have sensitivity of 89 and 83 percent, respectively, and specificity of 97.2 percent for both in the diagnosis of intestinal strongyloidiasis [101].\par \par Indirect immunofluorescence assays have also been developed, as has a gelatin particle agglutination test [102]. A newer technique, luciferase immunoprecipitation system (LIPS), is another attractive alternative to SAYDA-based methods [103] but is not commercially available.\par \par Endoscopy — Upper endoscopy is not usually needed to establish a diagnosis of strongyloidiasis. However, it may be performed in patients with gastrointestinal symptoms with unsuspected disease. Strongyloidiasis has a broad range of endoscopic features [104]:\par \par ?In the duodenum, the findings included edema, brown discoloration of the mucosa, erythematous spots, subepithelial hemorrhages, and megaduodenum.\par \par \par ?In the colon, the findings include loss of vascular pattern, edema, aphthous ulcers, erosions, serpiginous ulcerations, and xanthoma-like lesions.\par \par \par ?In the stomach, thickened folds and mucosal erosions are seen [105].\par \par \par Larvae may be demonstrated on biopsies of the affected mucosa [16,106].\par \par Strongyloides colitis can sometimes mimic ulcerative colitis, but distinctive features of the strongyloidiasis include skip pattern of the inflammation, distal attenuation of the disease, eosinophil-rich infiltrates, relatively intact crypt architecture, and frequent involvement of submucosa (picture 2) [107].\par \par \par DIFFERENTIAL DIAGNOSIS\par The differential diagnosis of strongyloidiasis includes:\par \par ?Ascariasis and hookworm – Strongyloidiasis, ascariasis, and hookworm can all cause nonspecific gastrointestinal and/or pulmonary symptoms or chronic urticaria and/or pruritus. In addition, all can be associated with eosinophilia in the presence or absence of other symptoms. Coinfection can occur; the diagnoses are distinguished by stool microscopy. (See "Approach to stool microscopy".)\par \par \par ?Cutaneous larva migrans – The strongyloidiasis skin reaction known as larva currens must be distinguished from cutaneous larva migrans; both are associated with migratory serpiginous lesions that are erythematous, raised, and pruritic. The conditions usually can be distinguished clinically based on the speed of advancement; larva currens can progress approximately 1 cm in 5 minutes and 5 to 15 cm per hour, while the larval track of cutaneous larva migrans progresses approximately 1 to 2 cm per day. Additionally, larva currens is typically evanescent and pink whereas larva migrans is angry looking, red, and raised and typically persists for weeks. (See "Hookworm-related cutaneous larva migrans".)\par \par \par ?Tropical filarial pulmonary eosinophilia – Disseminated strongyloidiasis may resemble tropical filarial pulmonary eosinophilia; symptoms include dry cough, wheeze, and fatigue. Tropical pulmonary eosinophilia is characterized by eosinophilia above 3000/microL; the diagnosis can be confirmed by filarial antibody titers. (See "Tropical pulmonary eosinophilia".)\par \par \par \par TREATMENT\par \par Uncomplicated infection — The treatment of choice for strongyloidiasis is ivermectin; albendazole is an alternative [108-110]. Prior to treatment, immigrants from areas of [place] endemic for loiasis should be screened to exclude high levels of Yesenia yesenia microfilaremia because administration of ivermectin to highly microfilaremic subjects can precipitate life-threatening encephalopathy. (See "Loiasis (Yesenia yesenia infection)".)\par \par In the past, thiabendazole was used for treatment uncomplicated infection. However, thiabendazole is now rarely used due to the availability of more efficacious and better tolerated medications.\par \par Ivermectin — The optimal ivermectin regimen for treatment of strongyloidiasis is uncertain; thus far, no regimen is clearly superior. Standard dosing of ivermectin consists of two single 200 mcg/kg doses of ivermectin administered on two consecutive days or administered two weeks apart [108,111,112].\par \par It is difficult to compare the efficacy of various regimens, since test-of-cure results vary depending on definition of cure and length of follow-up; in addition, interpretation of results in endemic areas is further confounded by possibility of reinfection. Many single-dose regimens, two-day regimens, and repeat regimens report high cure rates (often exceeding 90 percent) in immunocompetent individuals, but the sensitivity of stool tests to ascertain cure is variable.\par \par In one study including 21 patients living in a nonendemic region of [facility] treated with four doses of ivermectin (single dose administered on two consecutive days, then repeated two weeks later), reactivation (as assessed by stool microscopy and agar plate culture) was observed in 14 patients [113]. All patients had positive polymerase chain reaction (PCR) over four years of follow-up; the role of PCR to ascertain cure is unknown.\par \par Albendazole — Albendazole (400 mg by mouth on empty stomach twice daily for three to seven days) also has activity against Strongyloides [114,115]. In varied studies, the efficacy of albendazole has been lower than that of ivermectin, with a mean of 60 percent effectiveness for three days of albendazole versus 92 percent for ivermectin [116]. One study of 42 [place] patients with chronic strongyloidiasis noted a cure rate of 90 percent following a single dose of ivermectin compared with 50 percent for seven days of albendazole 800 mg/day [117].\par \par Other agents — Moxidectin is a veterinary drug that may be effective for treatment of Strongyloides. In a randomized trial including 127 patients with strongyloidiasis that compared the efficacy and safety of moxidectin (8 mg) with ivermectin (200 mcg/kg), cure rates were 93.7 and 95.2 percent, respectively [118]. Noninferiority could not be demonstrated because of underpowering of the study, but no side effects were observed; further studies are needed to determine whether moxidectin might have a role as a treatment alternative to ivermectin.\par \par Disseminated disease/hyperinfection syndrome — The optimal treatment of disseminated disease and hyperinfection is uncertain, as data are limited. In immunocompromised patients with disseminated disease, reduction of immunosuppressive therapy, if possible, is an important adjunct to any anthelminthic therapy. In such cases, it is also usually necessary to prolong or repeat ivermectin therapy, although there is no generally agreed upon regimen.\par \par Some experts give five to seven days of ivermectin in disseminated disease or combine ivermectin with albendazole until the patient responds. Often the duration of treatment is determined clinically; daily ivermectin should be administered until symptoms resolve and daily stool examinations have been negative for at least two weeks (one autoinfection cycle) or longer if the patient remains immunosuppressed [119].\par \par The optimal treatment of critically ill patients with the hyperinfection syndrome is uncertain; data regarding the ideal dose, duration, and route of therapy is limited. In patients with hyperinfection strongyloidiasis who were not able to receive oral therapy due to ileus or obtundation, alternative (not [place] Food and Drug Administration-approved) regimens including subcutaneous ivermectin (200 mcg/kg) [120-122] per rectal ivermectin administration [123] and a parenteral veterinary formulation of ivermectin [124-126] have been used with variable success. In one case of refractory strongyloidiasis, combined longer-term ivermectin and albendazole has been successful [127]. Ongoing monthly ivermectin for at least six months may be warranted in the setting of ongoing immunosuppression.\par \par Patients with disseminated disease/hyperinfection that does not resolve after initial treatment courses should be evaluated for an underlying immune defect such as human T-lymphotropic virus type I infection. (See "Human T-lymphotropic virus type I: Disease associations, diagnosis, and treatment", section on 'Diagnosis'.)\par \par Blood cultures should be obtained in the setting of hyperinfection syndrome, and broad-spectrum empiric antibiotics that include coverage of enteric gram-negative bacteria should be administered.\par \par \par PATIENT MONITORING\par Patients who are being treated for strongyloidiasis should have follow-up stool examinations, complete blood counts with eosinophilia, and serologies.\par \par Treatment failures may occur even with ivermectin in nonimmunocompromised hosts. If stool exams were positive for larvae prior to treatment, repeat stool exams should be performed about two to four weeks after treatment. In the setting of the hyperinfection syndrome, ivermectin treatment should be administered until symptoms resolve and daily stool examinations have been negative for at least two weeks. However, a negative stool exam is not definitive proof of parasitologic cure since stool exams are insensitive for detecting larvae. Persistent symptoms following treatment for strongyloidiasis should raise the possibility that initial ivermectin treatment was not fully curative.\par \par Decreasing titers of anti-Strongyloides antibodies may also be useful to assess adequacy of treatment; we recommend repeat serologies at three to six months [128]. One retrospective study of 31 patients treated for strongyloidiasis noted a significant reduction in blood eosinophilia and serologic antibody titer after an average of 96 and 270 days, respectively [75]. In another study among [place] refugees, 65 percent of patients followed with serial serology reached levels consistent with cure 6 to 12 months after treatment [129]. Eosinophilia persisting for several months after treatment suggests either failure to eradicate Strongyloides and/or other etiologies for eosinophilia [109]. (See "Approach to the patient with unexplained eosinophilia".)\par \par \par PROGNOSIS\par The prognosis of strongyloidiasis is good except in the hyperinfection syndrome. The latter patients have high case-fatality rates that are increased by concomitant immunosuppression, bacteremia, and delayed diagnosis [130]. Failure to respond to ivermectin raises the possibility that the patient has underlying human T-lymphotropic virus type I [131,132].\par \par \par PREVENTION\par Prevention of strongyloidiasis in endemic areas is mainly achieved by wearing shoes in order to avoid contact with infected soil.\par \par Patients with latent S. stercoralis infection who are about to begin immunosuppressive therapy are at risk for disseminated disease/hyperinfection syndrome, which can be fatal. Therefore, evaluation for Strongyloides infection is warranted for patients with epidemiologic risk (rural exposure in tropics, subtropics, [place], and southern [place], including  service).\par \par Data on the optimal approach to prevention are limited. In general, we favor the following:\par \par ?For patients who require prompt initiation of immunosuppression, serologic testing should be performed; empiric treatment for prevention of strongyloidiasis may be administered (ivermectin 200 mcg/kg daily for two days, repeated at two weeks) while serologic test results are pending. Ivermectin treatment may not be definitively curative. If serologic test results are found to be positive and/or the patient subsequently develops clinical manifestations consistent with hyperinfection syndrome, there should be a low threshold to administer repeat ivermectin therapy. (See 'Disseminated disease/hyperinfection syndrome' above.)\par \par \par ?For patients who do not require prompt initiation of immunosuppression (ie, there is sufficient time to pursue screening), serologic testing is appropriate. In the setting of high suspicion (ie, substantial epidemiologic exposure with presence of eosinophilia), stool examination (at least two concentrated specimens) for the presence of rhabditiform larvae is also appropriate. If either or both are positive, empiric treatment for prevention of strongyloidiasis (ivermectin 200 mcg/kg daily for two days, repeated at two weeks) is appropriate but may not be definitively curative.\par \par \par In addition, there is risk for donor-derived strongyloidiasis among solid organ transplant recipients receiving organs from donors with epidemiologic risk for latent Strongyloides infection. Such donors should undergo serologic testing for strongyloidiasis, and detection of donor Strongyloides infection should prompt administration of strongyloidiasis prophylaxis (ivermectin 200 mcg/kg daily for two days, repeated at two weeks) to the organ recipient at the time of transplantation.\par \par \par SUMMARY AND RECOMMENDATIONS\par \par ?Strongyloidiasis is endemic in tropical and subtropical regions and occurs sporadically in temperate areas. (See 'Introduction' above.)\par \par \par ?The burden of adult worms in infected humans can increase substantially via autoinfection. Among immunocompromised hosts, autoinfection can lead to the hyperinfection syndrome where there is massive dissemination of filariform larvae to the lungs, liver, heart, central nervous system, and endocrine glands. (See 'Life cycle' above and 'Hyperinfection syndrome' above.)\par \par \par ?Most infected patients do not experience prominent symptoms. The most common manifestations are mild waxing and waning gastrointestinal, cutaneous, or pulmonary symptoms that persist for years; others simply have eosinophilia in the absence of symptoms. (See 'Clinical manifestations' above.)\par \par \par ?For diagnosis, examination of at least two concentrated stool specimens for the presence of rhabditiform larvae should be pursued in addition to serologic testing. If these diagnostic tests are negative and clinical suspicion of strongyloidiasis remains, examination of duodenal fluid for larvae may be pursued; alternatively, empiric ivermectin therapy may be administered. (See 'Diagnosis' above.)\par \par \par ?We recommend treatment with ivermectin for uncomplicated strongyloidiasis (Grade 1B). Dosing is discussed above. (See 'Ivermectin' above.)\par \par \par ?In patients with disseminated disease (hyperinfection syndrome), we suggest treatment with ivermectin (Grade 2C), administered with extended dosing (200 mcg/kg daily for at least five to seven days). An alternative approach consists of ivermectin combined with albendazole. Daily stool examinations should be performed during treatment to determine the effect on larval burden, and treatment should be administered daily until symptoms resolve and stool tests have been negative for at least two weeks. (See 'Disseminated disease/hyperinfection syndrome' above.)\par \par \par ?Patients with epidemiologic risk for Strongyloides infection who are about to begin immunosuppressive therapy warrant screening or empiric treatment for Strongyloides infection. (See 'Prevention' above.)\par \par \par ?In patients with eosinophilia that persists for more than three months despite therapy, we recommend evaluation for treatment failure or other causes of eosinophilia. (See "Approach to the patient with unexplained eosinophilia".)\par Pt had episode of coughing wheezing in Feb and could this have been related to her strongyloides.

## 2019-06-24 NOTE — PHYSICAL EXAM
[General Appearance - Alert] : alert [General Appearance - In No Acute Distress] : in no acute distress [PERRL With Normal Accommodation] : pupils were equal in size, round, and reactive to light [Sclera] : the sclera and conjunctiva were normal [Oropharynx] : the oropharynx was normal [Neck Appearance] : the appearance of the neck was normal [Auscultation Breath Sounds / Voice Sounds] : lungs were clear to auscultation bilaterally [Heart Rate And Rhythm] : heart rate was normal and rhythm regular [Heart Sounds] : normal S1 and S2 [Heart Sounds Gallop] : no gallops [Heart Sounds Pericardial Friction Rub] : no pericardial rub [Full Pulse] : the pedal pulses are present [Edema] : there was no peripheral edema [Bowel Sounds] : normal bowel sounds [Abdomen Soft] : soft [Abdomen Tenderness] : non-tender [] : no hepato-splenomegaly [Abdomen Mass (___ Cm)] : no abdominal mass palpated [Cervical Lymph Nodes Enlarged Posterior Bilaterally] : posterior cervical [Cervical Lymph Nodes Enlarged Anterior Bilaterally] : anterior cervical [Supraclavicular Lymph Nodes Enlarged Bilaterally] : supraclavicular [Axillary Lymph Nodes Enlarged Bilaterally] : axillary [Femoral Lymph Nodes Enlarged Bilaterally] : femoral [Inguinal Lymph Nodes Enlarged Bilaterally] : inguinal [No CVA Tenderness] : no ~M costovertebral angle tenderness [Nail Clubbing] : no clubbing  or cyanosis of the fingernails [Abnormal Walk] : normal gait [Musculoskeletal - Swelling] : no joint swelling seen [Motor Tone] : muscle strength and tone were normal [Deep Tendon Reflexes (DTR)] : deep tendon reflexes were 2+ and symmetric [Sensation] : the sensory exam was normal to light touch and pinprick [No Focal Deficits] : no focal deficits [Oriented To Time, Place, And Person] : oriented to person, place, and time [Impaired Insight] : insight and judgment were intact [Affect] : the affect was normal

## 2019-07-01 LAB
DEPRECATED O AND P PREP STL: NORMAL
DEPRECATED O AND P PREP STL: NORMAL

## 2019-07-12 ENCOUNTER — EMERGENCY (EMERGENCY)
Facility: HOSPITAL | Age: 29
LOS: 1 days | Discharge: HOME | End: 2019-07-12
Attending: EMERGENCY MEDICINE | Admitting: EMERGENCY MEDICINE
Payer: COMMERCIAL

## 2019-07-12 VITALS
OXYGEN SATURATION: 98 % | HEIGHT: 61 IN | WEIGHT: 108.03 LBS | SYSTOLIC BLOOD PRESSURE: 119 MMHG | TEMPERATURE: 98 F | RESPIRATION RATE: 18 BRPM | HEART RATE: 82 BPM | DIASTOLIC BLOOD PRESSURE: 81 MMHG

## 2019-07-12 LAB
ALBUMIN SERPL ELPH-MCNC: 4.3 G/DL — SIGNIFICANT CHANGE UP (ref 3.3–5)
ALP SERPL-CCNC: 55 U/L — SIGNIFICANT CHANGE UP (ref 40–120)
ALT FLD-CCNC: 26 U/L — SIGNIFICANT CHANGE UP (ref 10–45)
ANION GAP SERPL CALC-SCNC: 9 MMOL/L — SIGNIFICANT CHANGE UP (ref 5–17)
AST SERPL-CCNC: 28 U/L — SIGNIFICANT CHANGE UP (ref 10–40)
BASOPHILS # BLD AUTO: 0.02 K/UL — SIGNIFICANT CHANGE UP (ref 0–0.2)
BASOPHILS NFR BLD AUTO: 0.5 % — SIGNIFICANT CHANGE UP (ref 0–2)
BILIRUB SERPL-MCNC: 0.3 MG/DL — SIGNIFICANT CHANGE UP (ref 0.2–1.2)
BUN SERPL-MCNC: 8 MG/DL — SIGNIFICANT CHANGE UP (ref 7–23)
CALCIUM SERPL-MCNC: 9.2 MG/DL — SIGNIFICANT CHANGE UP (ref 8.4–10.5)
CHLORIDE SERPL-SCNC: 102 MMOL/L — SIGNIFICANT CHANGE UP (ref 96–108)
CO2 SERPL-SCNC: 26 MMOL/L — SIGNIFICANT CHANGE UP (ref 22–31)
CREAT SERPL-MCNC: 0.58 MG/DL — SIGNIFICANT CHANGE UP (ref 0.5–1.3)
EOSINOPHIL # BLD AUTO: 0.15 K/UL — SIGNIFICANT CHANGE UP (ref 0–0.5)
EOSINOPHIL NFR BLD AUTO: 4 % — SIGNIFICANT CHANGE UP (ref 0–6)
GLUCOSE SERPL-MCNC: 102 MG/DL — HIGH (ref 70–99)
HBV SURFACE AG SER-ACNC: SIGNIFICANT CHANGE UP
HCT VFR BLD CALC: 37.9 % — SIGNIFICANT CHANGE UP (ref 34.5–45)
HCV AB S/CO SERPL IA: 0.11 S/CO — SIGNIFICANT CHANGE UP
HCV AB SERPL-IMP: SIGNIFICANT CHANGE UP
HGB BLD-MCNC: 12.4 G/DL — SIGNIFICANT CHANGE UP (ref 11.5–15.5)
HIV 1+2 AB+HIV1 P24 AG SERPL QL IA: SIGNIFICANT CHANGE UP
IMM GRANULOCYTES NFR BLD AUTO: 0.3 % — SIGNIFICANT CHANGE UP (ref 0–1.5)
LYMPHOCYTES # BLD AUTO: 1.63 K/UL — SIGNIFICANT CHANGE UP (ref 1–3.3)
LYMPHOCYTES # BLD AUTO: 43.5 % — SIGNIFICANT CHANGE UP (ref 13–44)
MCHC RBC-ENTMCNC: 29.2 PG — SIGNIFICANT CHANGE UP (ref 27–34)
MCHC RBC-ENTMCNC: 32.7 GM/DL — SIGNIFICANT CHANGE UP (ref 32–36)
MCV RBC AUTO: 89.4 FL — SIGNIFICANT CHANGE UP (ref 80–100)
MONOCYTES # BLD AUTO: 0.31 K/UL — SIGNIFICANT CHANGE UP (ref 0–0.9)
MONOCYTES NFR BLD AUTO: 8.3 % — SIGNIFICANT CHANGE UP (ref 2–14)
NEUTROPHILS # BLD AUTO: 1.63 K/UL — LOW (ref 1.8–7.4)
NEUTROPHILS NFR BLD AUTO: 43.4 % — SIGNIFICANT CHANGE UP (ref 43–77)
NRBC # BLD: 0 /100 WBCS — SIGNIFICANT CHANGE UP (ref 0–0)
PLATELET # BLD AUTO: 220 K/UL — SIGNIFICANT CHANGE UP (ref 150–400)
POTASSIUM SERPL-MCNC: 3.8 MMOL/L — SIGNIFICANT CHANGE UP (ref 3.5–5.3)
POTASSIUM SERPL-SCNC: 3.8 MMOL/L — SIGNIFICANT CHANGE UP (ref 3.5–5.3)
PROT SERPL-MCNC: 6.8 G/DL — SIGNIFICANT CHANGE UP (ref 6–8.3)
RBC # BLD: 4.24 M/UL — SIGNIFICANT CHANGE UP (ref 3.8–5.2)
RBC # FLD: 13.8 % — SIGNIFICANT CHANGE UP (ref 10.3–14.5)
SODIUM SERPL-SCNC: 137 MMOL/L — SIGNIFICANT CHANGE UP (ref 135–145)
WBC # BLD: 3.75 K/UL — LOW (ref 3.8–10.5)
WBC # FLD AUTO: 3.75 K/UL — LOW (ref 3.8–10.5)

## 2019-07-12 PROCEDURE — 99284 EMERGENCY DEPT VISIT MOD MDM: CPT

## 2019-07-12 PROCEDURE — 99283 EMERGENCY DEPT VISIT LOW MDM: CPT

## 2019-07-12 PROCEDURE — 80053 COMPREHEN METABOLIC PANEL: CPT

## 2019-07-12 PROCEDURE — 87389 HIV-1 AG W/HIV-1&-2 AB AG IA: CPT

## 2019-07-12 PROCEDURE — 86780 TREPONEMA PALLIDUM: CPT

## 2019-07-12 PROCEDURE — 80074 ACUTE HEPATITIS PANEL: CPT

## 2019-07-12 PROCEDURE — 85025 COMPLETE CBC W/AUTO DIFF WBC: CPT

## 2019-07-12 PROCEDURE — 36415 COLL VENOUS BLD VENIPUNCTURE: CPT

## 2019-07-12 RX ORDER — EMTRICITABINE AND TENOFOVIR DISOPROXIL FUMARATE 200; 300 MG/1; MG/1
1 TABLET, FILM COATED ORAL ONCE
Refills: 0 | Status: COMPLETED | OUTPATIENT
Start: 2019-07-12 | End: 2019-07-12

## 2019-07-12 RX ORDER — RALTEGRAVIR 400 MG/1
400 TABLET, FILM COATED ORAL ONCE
Refills: 0 | Status: COMPLETED | OUTPATIENT
Start: 2019-07-12 | End: 2019-07-12

## 2019-07-12 RX ORDER — RALTEGRAVIR 400 MG/1
1 TABLET, FILM COATED ORAL
Qty: 60 | Refills: 0
Start: 2019-07-12 | End: 2019-08-10

## 2019-07-12 RX ORDER — EMTRICITABINE AND TENOFOVIR DISOPROXIL FUMARATE 200; 300 MG/1; MG/1
1 TABLET, FILM COATED ORAL
Qty: 30 | Refills: 0
Start: 2019-07-12 | End: 2019-08-10

## 2019-07-12 RX ADMIN — RALTEGRAVIR 400 MILLIGRAM(S): 400 TABLET, FILM COATED ORAL at 15:39

## 2019-07-12 RX ADMIN — EMTRICITABINE AND TENOFOVIR DISOPROXIL FUMARATE 1 TABLET(S): 200; 300 TABLET, FILM COATED ORAL at 15:39

## 2019-07-12 NOTE — ED ADULT NURSE NOTE - OBJECTIVE STATEMENT
Patient AOx4 c/o of needle stick to R second finger---performed hand washing afterwards---states source patient is +hep C.

## 2019-07-12 NOTE — ED PROVIDER NOTE - OBJECTIVE STATEMENT
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

## 2019-07-12 NOTE — ED PROVIDER NOTE - PROGRESS NOTE DETAILS
HIV neg - Hep C titer neg - will req follow up in employee health for serial Hep C titers, HIV titers in the next 9 months- HIV prop Rx given in ED and 1 month supply called into pharmacy at [facility]

## 2019-07-12 NOTE — ED ADULT NURSE REASSESSMENT NOTE - NS ED NURSE REASSESS COMMENT FT1
Patient verbalizes "I am not pregnant"---MD Holman made aware. As per MD Holman, pregnancy test not needed at this time.

## 2019-07-12 NOTE — ED PROVIDER NOTE - CLINICAL SUMMARY MEDICAL DECISION MAKING FREE TEXT BOX
needle stick  hep C + pt  ? HIV status -meds given needle stick  hep C + pt  ? HIV status -meds given  tet UTD  wound was washed cleaned after injury  pt to consult source pt to draw HIV titer on floor

## 2019-07-12 NOTE — ED ADULT NURSE NOTE - CHPI ED NUR SYMPTOMS NEG
no nausea/no tingling/no chills/no dizziness/no decreased eating/drinking/no fever/no weakness/no vomiting/no pain

## 2019-07-12 NOTE — ED PROVIDER NOTE - NSFOLLOWUPINSTRUCTIONS_ED_ALL_ED_FT
Needlestick and Sharps Injury  A needlestick injury happens when a person is stuck with a needle or sharp tool (sharps) that may have someone else's blood on it. Needlestick injuries are most common among health care workers, but can also happen to people in the community who are exposed to needles. A needlestick injury may expose you to blood that carries infections such as:  Hepatitis B.  Hepatitis C.  Human immunodeficiency virus (HIV).  What are the causes?  This injury is caused by a needle or other sharp tool that punctures your skin. It can happen to people who are:  Giving an injection.  Drawing blood.  Performing medical procedures with a needle or scalpel.  Handling or throwing away used needles (sharps).  Cleaning equipment or patient care areas.  This injury can also occur if you:  Accidentally come into contact with a needle that was discarded in a public place.  Share a needle or inject illegal drugs.  What increases the risk?  This injury is more likely to happen to health care workers who:  Recap needles.  Pass sharp objects to another person.  Do not use or have access to needle safety devices.  Feel pressure or a sense of urgency in the work place when using needles.  What are the signs or symptoms?  Symptoms of this injury include:  Pain or irritation at the injury site.  Bleeding at the injury site.  How is this diagnosed?  This condition is diagnosed based on:  A physical exam.  How the injury happened.  Your health care provider may check the medical history of the person whose blood you were exposed to. That person's blood may also be tested.    How is this treated?  ImageIf you have a needle stick injury or think you may have been exposed to blood or body fluids:  Wash the injured area right away with soap and water.  Place a bandage or clean towel on the wound and apply gentle pressure to stop the bleeding. Do not squeeze or rub the area.  Notify a work place supervisor or health care provider right away. Follow any procedures in your work place if applicable.  If needed, treatment must be started as soon as possible after the exposure.  Tell your health care provider if you are pregnant or breastfeeding.  Treatments may include:  A tetanus booster shot. This shot may be given if you have not had a tetanus booster shot within the past 10 years.  A hepatitis B vaccination.  Blood tests. These may be recommended for up to 6 months after the injury to rule out infection.  Medicines to prevent infection (post-exposure prophylaxis).  Wound care.  Follow these instructions at home:  Medicines     Take over-the-counter and prescription medicines only as told by your health care provider.  If you were prescribed an antibiotic medicine, take it as told by your health care provider. Do not stop using the antibiotic even if you start to feel better.  General instructions     Keep all follow-up visits as told by your health care provider. This is important.  Wound care     There are many ways to close and cover a wound. For example, a wound can be covered with sutures, skin glue, or adhesive strips. Follow instructions from your health care provider about:  How to take care of your wound.  When and how you should change your dressing.  Keep the dressing dry as told by your health care provider. Do not take baths, swim, use a hot tub, or do anything that would put your wound underwater until your health care provider approves.  Check your wound every day for signs of infection. Check for:  Redness, swelling, or pain.  Fluid or blood.  Pus or a bad smell.  Warmth.  Contact a health care provider if you:  Have redness, swelling, or pain at the injury site.  Have a fever.  Feel anxious, angry, or depressed.  Have difficulty sleeping.  Have skin or whites of your eyes that look yellow (jaundice).  Have belly pain or a feeling of fullness.  Have fatigue.  Feel generally sick (malaise).  Have frequent infections.  Summary  A needlestick injury happens when a person is stuck with a needle or sharp object that may have someone else's blood on it. The injury may expose the person to blood that carries infections.  Treatment starts with cleaning the injured area right away with soap and water.  Treatment may also include a tetanus booster shot, a hepatitis B vaccine, and medicines to prevent infection.  This information is not intended to replace advice given to you by your health care provider. Make sure you discuss any questions you have with your health care provider.

## 2019-07-13 LAB
HAV IGM SER-ACNC: SIGNIFICANT CHANGE UP
HBV CORE IGM SER-ACNC: SIGNIFICANT CHANGE UP
T PALLIDUM AB TITR SER: NEGATIVE — SIGNIFICANT CHANGE UP
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
## 2019-07-15 DIAGNOSIS — S60.941A UNSPECIFIED SUPERFICIAL INJURY OF LEFT INDEX FINGER, INITIAL ENCOUNTER: ICD-10-CM
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
## 2019-07-15 LAB
HCV RNA SERPL NAA DL=5-ACNC: SIGNIFICANT CHANGE UP IU/ML
HCV RNA SPEC NAA+PROBE-LOG IU: SIGNIFICANT CHANGE UP LOGIU/ML

## 2019-07-29 ENCOUNTER — LABORATORY RESULT (OUTPATIENT)
Age: 29
End: 2019-07-29

## 2019-07-29 ENCOUNTER — APPOINTMENT (OUTPATIENT)
Dept: INTERNAL MEDICINE | Facility: CLINIC | Age: 29
End: 2019-07-29
Payer: COMMERCIAL

## 2019-07-29 VITALS
WEIGHT: 104 LBS | BODY MASS INDEX: 19.63 KG/M2 | DIASTOLIC BLOOD PRESSURE: 67 MMHG | TEMPERATURE: 98.6 F | OXYGEN SATURATION: 98 % | HEIGHT: 61 IN | HEART RATE: 100 BPM | SYSTOLIC BLOOD PRESSURE: 108 MMHG

## 2019-07-29 DIAGNOSIS — B78.9 STRONGYLOIDIASIS, UNSPECIFIED: ICD-10-CM

## 2019-07-29 DIAGNOSIS — K57.30 DIVERTICULOSIS OF LARGE INTESTINE W/OUT PERFORATION OR ABSCESS W/OUT BLEEDING: ICD-10-CM

## 2019-07-29 DIAGNOSIS — D72.10 EOSINOPHILIA, UNSPECIFIED: ICD-10-CM

## 2019-07-29 DIAGNOSIS — R16.0 HEPATOMEGALY, NOT ELSEWHERE CLASSIFIED: ICD-10-CM

## 2019-07-29 PROCEDURE — 99214 OFFICE O/P EST MOD 30 MIN: CPT | Mod: 25

## 2019-07-29 PROCEDURE — 83014 H PYLORI DRUG ADMIN: CPT

## 2019-07-29 NOTE — PHYSICAL EXAM
[General Appearance - Alert] : alert [General Appearance - In No Acute Distress] : in no acute distress [Sclera] : the sclera and conjunctiva were normal [PERRL With Normal Accommodation] : pupils were equal in size, round, and reactive to light [Extraocular Movements] : extraocular movements were intact [Oropharynx] : the oropharynx was normal [Auscultation Breath Sounds / Voice Sounds] : lungs were clear to auscultation bilaterally [Heart Rate And Rhythm] : heart rate was normal and rhythm regular [Heart Sounds] : normal S1 and S2 [Heart Sounds Gallop] : no gallops [Murmurs] : no murmurs [Heart Sounds Pericardial Friction Rub] : no pericardial rub [Edema] : there was no peripheral edema [Bowel Sounds] : normal bowel sounds [Abdomen Soft] : soft [Abdomen Tenderness] : non-tender [Abdomen Mass (___ Cm)] : no abdominal mass palpated [Cervical Lymph Nodes Enlarged Posterior Bilaterally] : posterior cervical [Supraclavicular Lymph Nodes Enlarged Bilaterally] : supraclavicular [Cervical Lymph Nodes Enlarged Anterior Bilaterally] : anterior cervical [Axillary Lymph Nodes Enlarged Bilaterally] : axillary [Femoral Lymph Nodes Enlarged Bilaterally] : femoral [Inguinal Lymph Nodes Enlarged Bilaterally] : inguinal [Abnormal Walk] : normal gait [Musculoskeletal - Swelling] : no joint swelling seen [Nail Clubbing] : no clubbing  or cyanosis of the fingernails [Skin Color & Pigmentation] : normal skin color and pigmentation [Motor Tone] : muscle strength and tone were normal [Skin Turgor] : normal skin turgor [] : no rash [Impaired Insight] : insight and judgment were intact [Oriented To Time, Place, And Person] : oriented to person, place, and time [Affect] : the affect was normal

## 2019-07-29 NOTE — HISTORY OF PRESENT ILLNESS
[Heartburn] : denies heartburn [Nausea] : denies nausea [Vomiting] : denies vomiting [Diarrhea] : denies diarrhea [Constipation] : denies constipation [Yellow Skin Or Eyes (Jaundice)] : denies jaundice [Abdominal Pain] : denies abdominal pain [Abdominal Swelling] : denies abdominal swelling [Rectal Pain] : denies rectal pain [Wt Gain ___ Lbs] : recent [unfilled] ~Upound(s) weight gain [Diverticulitis] : diverticulitis [Good Compliance] : good compliance with treatment [GERD] : no gastroesophageal reflux disease [Hiatus Hernia] : no hiatus hernia [Peptic Ulcer Disease] : no peptic ulcer disease [Pancreatitis] : no pancreatitis [Cholelithiasis] : no cholelithiasis [Kidney Stone] : no kidney stone [Inflammatory Bowel Disease] : no inflammatory bowel disease [Irritable Bowel Syndrome] : no irritable bowel syndrome [Alcohol Abuse] : no alcohol abuse [Malignancy] : no malignancy [Abdominal Surgery] : no abdominal surgery [Cholecystectomy] : no cholecystectomy [Appendectomy] : no appendectomy [de-identified] : Pt has completed medication for strongyloides and no longer has abd pain or diarrhea.  She is eating well and has gained 1 lbs.  she is to have colonoscopy  on 9/13

## 2019-07-29 NOTE — END OF VISIT
[FreeTextEntry3] : resident present during entire exam  [>50% of Time Spent on Counseling for ____] : Greater than 50% of the encounter time was spent on counseling for [unfilled]

## 2019-07-29 NOTE — ASSESSMENT
[FreeTextEntry1] : strongyloidiasis  fu eosinophil count  eosinophilia test for allergies  food and [employer] and colon diverticulosis   high fiber diet  colonoscopy.   She is doing well and has no complaints and diarrhea has resolved.   lung nodules she saw pulmonary and will have fu ct scan .  she has vitd def and will fu levels she completed medication.

## 2019-07-30 DIAGNOSIS — E55.9 VITAMIN D DEFICIENCY, UNSPECIFIED: ICD-10-CM

## 2019-07-30 LAB
25(OH)D3 SERPL-MCNC: 18.3 NG/ML
ALBUMIN SERPL ELPH-MCNC: 4.5 G/DL
ALP BLD-CCNC: 62 U/L
ALT SERPL-CCNC: 39 U/L
ANION GAP SERPL CALC-SCNC: 11 MMOL/L
AST SERPL-CCNC: 27 U/L
BASOPHILS # BLD AUTO: 0.02 K/UL
BASOPHILS NFR BLD AUTO: 0.4 %
BILIRUB SERPL-MCNC: 0.3 MG/DL
BUN SERPL-MCNC: 11 MG/DL
CALCIUM SERPL-MCNC: 9.3 MG/DL
CHLORIDE SERPL-SCNC: 104 MMOL/L
CO2 SERPL-SCNC: 25 MMOL/L
CREAT SERPL-MCNC: 0.65 MG/DL
EOSINOPHIL # BLD AUTO: 0.25 K/UL
EOSINOPHIL NFR BLD AUTO: 5.1 %
GLUCOSE SERPL-MCNC: 81 MG/DL
HCT VFR BLD CALC: 42.5 %
HGB BLD-MCNC: 13.3 G/DL
IMM GRANULOCYTES NFR BLD AUTO: 0.4 %
LYMPHOCYTES # BLD AUTO: 1.57 K/UL
LYMPHOCYTES NFR BLD AUTO: 32.2 %
MAN DIFF?: NORMAL
MCHC RBC-ENTMCNC: 28.7 PG
MCHC RBC-ENTMCNC: 31.3 GM/DL
MCV RBC AUTO: 91.8 FL
MONOCYTES # BLD AUTO: 0.33 K/UL
MONOCYTES NFR BLD AUTO: 6.8 %
NEUTROPHILS # BLD AUTO: 2.69 K/UL
NEUTROPHILS NFR BLD AUTO: 55.1 %
PLATELET # BLD AUTO: 274 K/UL
POTASSIUM SERPL-SCNC: 4.4 MMOL/L
PROT SERPL-MCNC: 6.8 G/DL
RBC # BLD: 4.63 M/UL
RBC # FLD: 14.7 %
SODIUM SERPL-SCNC: 140 MMOL/L
UREA BREATH TEST QL: NEGATIVE
WBC # FLD AUTO: 4.88 K/UL

## 2019-07-31 LAB
BAKER'S YEAST AB QL: 44.4 UNITS
BAKER'S YEAST IGA QL IA: 111.1 UNITS
BAKER'S YEAST IGA QN IA: POSITIVE
BAKER'S YEAST IGG QN IA: POSITIVE
FERRITIN SERPL-MCNC: 23 NG/ML
IRON SATN MFR SERPL: 31 %
IRON SERPL-MCNC: 120 UG/DL
TIBC SERPL-MCNC: 386 UG/DL
UIBC SERPL-MCNC: 266 UG/DL

## 2019-08-10 LAB
A ALTERNATA IGE QN: <0.1 KUA/L
A FUMIGATUS IGE QN: <0.1 KUA/L
BARLEY IGE QN: <0.1 KUA/L
BERMUDA GRASS IGE QN: <0.1 KUA/L
BOXELDER IGE QN: <0.1 KUA/L
C HERBARUM IGE QN: <0.1 KUA/L
CALIF WALNUT IGE QN: <0.1 KUA/L
CAT DANDER IGE QN: 1.32 KUA/L
CHERRY IGE QN: <0.1 KUA/L
CMN PIGWEED IGE QN: <0.1 KUA/L
COMMON RAGWEED IGE QN: <0.1 KUA/L
COTTONWOOD IGE QN: <0.1 KUA/L
COW MILK IGE QN: 1.49 KUA/L
CRAB IGE QN: <0.1 KUA/L
D FARINAE IGE QN: <0.1 KUA/L
D PTERONYSS IGE QN: <0.1 KUA/L
DEPRECATED A ALTERNATA IGE RAST QL: 0
DEPRECATED A FUMIGATUS IGE RAST QL: 0
DEPRECATED BARLEY IGE RAST QL: 0
DEPRECATED BERMUDA GRASS IGE RAST QL: 0
DEPRECATED BOXELDER IGE RAST QL: 0
DEPRECATED C HERBARUM IGE RAST QL: 0
DEPRECATED CAT DANDER IGE RAST QL: 2
DEPRECATED CHERRY IGE RAST QL: 0
DEPRECATED COMMON PIGWEED IGE RAST QL: 0
DEPRECATED COMMON RAGWEED IGE RAST QL: 0
DEPRECATED COTTONWOOD IGE RAST QL: 0
DEPRECATED COW MILK IGE RAST QL: 2
DEPRECATED CRAB IGE RAST QL: 0
DEPRECATED D FARINAE IGE RAST QL: 0
DEPRECATED D PTERONYSS IGE RAST QL: 0
DEPRECATED DOG DANDER IGE RAST QL: NORMAL
DEPRECATED EGG WHITE IGE RAST QL: 0
DEPRECATED GOOSEFOOT IGE RAST QL: 0
DEPRECATED LONDON PLANE IGE RAST QL: 0
DEPRECATED MUGWORT IGE RAST QL: 0
DEPRECATED OAT IGE RAST QL: 0
DEPRECATED P NOTATUM IGE RAST QL: 0
DEPRECATED PEANUT IGE RAST QL: 0
DEPRECATED RED CEDAR IGE RAST QL: 0
DEPRECATED ROACH IGE RAST QL: 4
DEPRECATED RYE IGE RAST QL: 0
DEPRECATED SHEEP SORREL IGE RAST QL: 0
DEPRECATED SILVER BIRCH IGE RAST QL: 0
DEPRECATED SOYBEAN IGE RAST QL: 0
DEPRECATED TIMOTHY IGE RAST QL: 0
DEPRECATED WHEAT IGE RAST QL: 0
DEPRECATED WHITE ASH IGE RAST QL: 0
DEPRECATED WHITE OAK IGE RAST QL: 0
DOG DANDER IGE QN: 0.21 KUA/L
EGG WHITE IGE QN: <0.1 KUA/L
GOOSEFOOT IGE QN: <0.1 KUA/L
LONDON PLANE IGE QN: <0.1 KUA/L
MUGWORT IGE QN: <0.1 KUA/L
MULBERRY (T70) CLASS: 0
MULBERRY (T70) CONC: <0.1 KUA/L
OAT IGE QN: <0.1 KUA/L
P NOTATUM IGE QN: <0.1 KUA/L
PEANUT IGE QN: <0.1 KUA/L
RED CEDAR IGE QN: <0.1 KUA/L
ROACH IGE QN: 20.4 KUA/L
RYE IGE QN: <0.1 KUA/L
SHEEP SORREL IGE QN: <0.1 KUA/L
SILVER BIRCH IGE QN: <0.1 KUA/L
SOYBEAN IGE QN: <0.1 KUA/L
TIMOTHY IGE QN: <0.1 KUA/L
TOTAL IGE SMQN RAST: 235 KU/L
TREE ALLERG MIX1 IGE QL: 0
WHEAT IGE QN: <0.1 KUA/L
WHITE ASH IGE QN: <0.1 KUA/L
WHITE ELM IGE QN: 0
WHITE ELM IGE QN: <0.1 KUA/L
WHITE OAK IGE QN: <0.1 KUA/L

## 2019-08-27 ENCOUNTER — APPOINTMENT (OUTPATIENT)
Dept: INTERNAL MEDICINE | Facility: CLINIC | Age: 29
End: 2019-08-27

## 2019-09-03 ENCOUNTER — FORM ENCOUNTER (OUTPATIENT)
Age: 29
End: 2019-09-03

## 2019-09-04 ENCOUNTER — APPOINTMENT (OUTPATIENT)
Dept: CT IMAGING | Facility: HOSPITAL | Age: 29
End: 2019-09-04
Payer: COMMERCIAL

## 2019-09-04 ENCOUNTER — CLINICAL ADVICE (OUTPATIENT)
Age: 29
End: 2019-09-04

## 2019-09-04 ENCOUNTER — TRANSCRIPTION ENCOUNTER (OUTPATIENT)
Age: 29
End: 2019-09-04

## 2019-09-04 ENCOUNTER — OUTPATIENT (OUTPATIENT)
Dept: OUTPATIENT SERVICES | Facility: HOSPITAL | Age: 29
LOS: 1 days | End: 2019-09-04
Payer: COMMERCIAL

## 2019-09-04 PROCEDURE — 71250 CT THORAX DX C-: CPT

## 2019-09-04 PROCEDURE — 71250 CT THORAX DX C-: CPT | Mod: 26

## 2019-09-13 ENCOUNTER — APPOINTMENT (OUTPATIENT)
Dept: INTERNAL MEDICINE | Facility: HOSPITAL | Age: 29
End: 2019-09-13

## 2019-10-04 ENCOUNTER — CLINICAL ADVICE (OUTPATIENT)
Age: 29
End: 2019-10-04

## 2019-10-04 ENCOUNTER — LABORATORY RESULT (OUTPATIENT)
Age: 29
End: 2019-10-04

## 2019-10-04 ENCOUNTER — RX RENEWAL (OUTPATIENT)
Age: 29
End: 2019-10-04

## 2019-10-05 LAB
APPEARANCE: CLEAR
BILIRUBIN URINE: NEGATIVE
BLOOD URINE: NEGATIVE
COLOR: YELLOW
GLUCOSE QUALITATIVE U: NEGATIVE
KETONES URINE: NEGATIVE
LEUKOCYTE ESTERASE URINE: NEGATIVE
NITRITE URINE: NEGATIVE
PH URINE: 5.5
PROTEIN URINE: NEGATIVE
SPECIFIC GRAVITY URINE: 1.02
UROBILINOGEN URINE: NORMAL

## 2019-10-11 ENCOUNTER — CLINICAL ADVICE (OUTPATIENT)
Age: 29
End: 2019-10-11

## 2019-11-20 ENCOUNTER — RX RENEWAL (OUTPATIENT)
Age: 29
End: 2019-11-20

## 2019-12-03 ENCOUNTER — RX RENEWAL (OUTPATIENT)
Age: 29
End: 2019-12-03

## 2019-12-03 RX ORDER — DOXYCYCLINE HYCLATE 100 MG/1
100 CAPSULE ORAL
Qty: 14 | Refills: 1 | Status: DISCONTINUED | COMMUNITY
Start: 2019-11-20 | End: 2019-12-03

## 2019-12-03 RX ORDER — CIPROFLOXACIN HYDROCHLORIDE 500 MG/1
500 TABLET, FILM COATED ORAL
Qty: 10 | Refills: 0 | Status: DISCONTINUED | COMMUNITY
Start: 2016-11-10 | End: 2019-12-03

## 2019-12-04 ENCOUNTER — MEDICATION RENEWAL (OUTPATIENT)
Age: 29
End: 2019-12-04

## 2019-12-20 ENCOUNTER — CLINICAL ADVICE (OUTPATIENT)
Age: 29
End: 2019-12-20

## 2019-12-20 ENCOUNTER — RX RENEWAL (OUTPATIENT)
Age: 29
End: 2019-12-20

## 2019-12-23 ENCOUNTER — TRANSCRIPTION ENCOUNTER (OUTPATIENT)
Age: 29
End: 2019-12-23

## 2019-12-23 ENCOUNTER — CLINICAL ADVICE (OUTPATIENT)
Age: 29
End: 2019-12-23

## 2020-03-11 ENCOUNTER — INPATIENT (INPATIENT)
Facility: HOSPITAL | Age: 30
LOS: 3 days | Discharge: HOME | DRG: 387 | End: 2020-03-15
Attending: STUDENT IN AN ORGANIZED HEALTH CARE EDUCATION/TRAINING PROGRAM | Admitting: STUDENT IN AN ORGANIZED HEALTH CARE EDUCATION/TRAINING PROGRAM
Payer: COMMERCIAL

## 2020-03-11 VITALS
OXYGEN SATURATION: 98 % | HEIGHT: 61 IN | HEART RATE: 101 BPM | WEIGHT: 110.01 LBS | DIASTOLIC BLOOD PRESSURE: 82 MMHG | RESPIRATION RATE: 18 BRPM | SYSTOLIC BLOOD PRESSURE: 123 MMHG | TEMPERATURE: 98 F

## 2020-03-12 DIAGNOSIS — K51.00 ULCERATIVE (CHRONIC) PANCOLITIS WITHOUT COMPLICATIONS: ICD-10-CM

## 2020-03-12 DIAGNOSIS — J45.909 UNSPECIFIED ASTHMA, UNCOMPLICATED: ICD-10-CM

## 2020-03-12 DIAGNOSIS — Z29.9 ENCOUNTER FOR PROPHYLACTIC MEASURES, UNSPECIFIED: ICD-10-CM

## 2020-03-12 LAB
ALBUMIN SERPL ELPH-MCNC: 3.1 G/DL — LOW (ref 3.5–5)
ALP SERPL-CCNC: 73 U/L — SIGNIFICANT CHANGE UP (ref 40–120)
ALT FLD-CCNC: 26 U/L DA — SIGNIFICANT CHANGE UP (ref 10–60)
ANION GAP SERPL CALC-SCNC: 7 MMOL/L — SIGNIFICANT CHANGE UP (ref 5–17)
ANION GAP SERPL CALC-SCNC: 7 MMOL/L — SIGNIFICANT CHANGE UP (ref 5–17)
APPEARANCE UR: CLEAR — SIGNIFICANT CHANGE UP
AST SERPL-CCNC: 21 U/L — SIGNIFICANT CHANGE UP (ref 10–40)
BASOPHILS # BLD AUTO: 0.02 K/UL — SIGNIFICANT CHANGE UP (ref 0–0.2)
BASOPHILS NFR BLD AUTO: 0.3 % — SIGNIFICANT CHANGE UP (ref 0–2)
BILIRUB SERPL-MCNC: 0.1 MG/DL — LOW (ref 0.2–1.2)
BILIRUB UR-MCNC: NEGATIVE — SIGNIFICANT CHANGE UP
BUN SERPL-MCNC: 11 MG/DL — SIGNIFICANT CHANGE UP (ref 7–18)
BUN SERPL-MCNC: 5 MG/DL — LOW (ref 7–18)
CALCIUM SERPL-MCNC: 7.4 MG/DL — LOW (ref 8.4–10.5)
CALCIUM SERPL-MCNC: 8.3 MG/DL — LOW (ref 8.4–10.5)
CHLORIDE SERPL-SCNC: 107 MMOL/L — SIGNIFICANT CHANGE UP (ref 96–108)
CHLORIDE SERPL-SCNC: 110 MMOL/L — HIGH (ref 96–108)
CO2 SERPL-SCNC: 22 MMOL/L — SIGNIFICANT CHANGE UP (ref 22–31)
CO2 SERPL-SCNC: 25 MMOL/L — SIGNIFICANT CHANGE UP (ref 22–31)
COLOR SPEC: YELLOW — SIGNIFICANT CHANGE UP
CREAT SERPL-MCNC: 0.62 MG/DL — SIGNIFICANT CHANGE UP (ref 0.5–1.3)
CREAT SERPL-MCNC: 0.72 MG/DL — SIGNIFICANT CHANGE UP (ref 0.5–1.3)
DIFF PNL FLD: ABNORMAL
EOSINOPHIL # BLD AUTO: 0.22 K/UL — SIGNIFICANT CHANGE UP (ref 0–0.5)
EOSINOPHIL NFR BLD AUTO: 2.9 % — SIGNIFICANT CHANGE UP (ref 0–6)
GLUCOSE SERPL-MCNC: 131 MG/DL — HIGH (ref 70–99)
GLUCOSE SERPL-MCNC: 92 MG/DL — SIGNIFICANT CHANGE UP (ref 70–99)
GLUCOSE UR QL: NEGATIVE — SIGNIFICANT CHANGE UP
HCG SERPL-ACNC: <1 MIU/ML — SIGNIFICANT CHANGE UP
HCT VFR BLD CALC: 41.3 % — SIGNIFICANT CHANGE UP (ref 34.5–45)
HGB BLD-MCNC: 13 G/DL — SIGNIFICANT CHANGE UP (ref 11.5–15.5)
IMM GRANULOCYTES NFR BLD AUTO: 0.1 % — SIGNIFICANT CHANGE UP (ref 0–1.5)
KETONES UR-MCNC: NEGATIVE — SIGNIFICANT CHANGE UP
LEUKOCYTE ESTERASE UR-ACNC: NEGATIVE — SIGNIFICANT CHANGE UP
LYMPHOCYTES # BLD AUTO: 1.1 K/UL — SIGNIFICANT CHANGE UP (ref 1–3.3)
LYMPHOCYTES # BLD AUTO: 14.3 % — SIGNIFICANT CHANGE UP (ref 13–44)
MCHC RBC-ENTMCNC: 25.8 PG — LOW (ref 27–34)
MCHC RBC-ENTMCNC: 31.5 GM/DL — LOW (ref 32–36)
MCV RBC AUTO: 81.9 FL — SIGNIFICANT CHANGE UP (ref 80–100)
MONOCYTES # BLD AUTO: 0.56 K/UL — SIGNIFICANT CHANGE UP (ref 0–0.9)
MONOCYTES NFR BLD AUTO: 7.3 % — SIGNIFICANT CHANGE UP (ref 2–14)
NEUTROPHILS # BLD AUTO: 5.8 K/UL — SIGNIFICANT CHANGE UP (ref 1.8–7.4)
NEUTROPHILS NFR BLD AUTO: 75.1 % — SIGNIFICANT CHANGE UP (ref 43–77)
NITRITE UR-MCNC: NEGATIVE — SIGNIFICANT CHANGE UP
NRBC # BLD: 0 /100 WBCS — SIGNIFICANT CHANGE UP (ref 0–0)
PH UR: 5 — SIGNIFICANT CHANGE UP (ref 5–8)
PLATELET # BLD AUTO: 245 K/UL — SIGNIFICANT CHANGE UP (ref 150–400)
POTASSIUM SERPL-MCNC: 3.3 MMOL/L — LOW (ref 3.5–5.3)
POTASSIUM SERPL-MCNC: 3.5 MMOL/L — SIGNIFICANT CHANGE UP (ref 3.5–5.3)
POTASSIUM SERPL-SCNC: 3.3 MMOL/L — LOW (ref 3.5–5.3)
POTASSIUM SERPL-SCNC: 3.5 MMOL/L — SIGNIFICANT CHANGE UP (ref 3.5–5.3)
PROT SERPL-MCNC: 6.9 G/DL — SIGNIFICANT CHANGE UP (ref 6–8.3)
PROT UR-MCNC: 30 MG/DL
RBC # BLD: 5.04 M/UL — SIGNIFICANT CHANGE UP (ref 3.8–5.2)
RBC # FLD: 16.4 % — HIGH (ref 10.3–14.5)
SODIUM SERPL-SCNC: 139 MMOL/L — SIGNIFICANT CHANGE UP (ref 135–145)
SODIUM SERPL-SCNC: 139 MMOL/L — SIGNIFICANT CHANGE UP (ref 135–145)
SP GR SPEC: 1.02 — SIGNIFICANT CHANGE UP (ref 1.01–1.02)
UROBILINOGEN FLD QL: NEGATIVE — SIGNIFICANT CHANGE UP
WBC # BLD: 7.71 K/UL — SIGNIFICANT CHANGE UP (ref 3.8–10.5)
WBC # FLD AUTO: 7.71 K/UL — SIGNIFICANT CHANGE UP (ref 3.8–10.5)

## 2020-03-12 PROCEDURE — 99222 1ST HOSP IP/OBS MODERATE 55: CPT

## 2020-03-12 PROCEDURE — 99221 1ST HOSP IP/OBS SF/LOW 40: CPT

## 2020-03-12 PROCEDURE — 74177 CT ABD & PELVIS W/CONTRAST: CPT | Mod: 26

## 2020-03-12 PROCEDURE — 99285 EMERGENCY DEPT VISIT HI MDM: CPT

## 2020-03-12 RX ORDER — METRONIDAZOLE 500 MG
500 TABLET ORAL EVERY 8 HOURS
Refills: 0 | Status: DISCONTINUED | OUTPATIENT
Start: 2020-03-12 | End: 2020-03-15

## 2020-03-12 RX ORDER — ONDANSETRON 8 MG/1
4 TABLET, FILM COATED ORAL ONCE
Refills: 0 | Status: DISCONTINUED | OUTPATIENT
Start: 2020-03-12 | End: 2020-03-12

## 2020-03-12 RX ORDER — MORPHINE SULFATE 50 MG/1
4 CAPSULE, EXTENDED RELEASE ORAL ONCE
Refills: 0 | Status: DISCONTINUED | OUTPATIENT
Start: 2020-03-12 | End: 2020-03-12

## 2020-03-12 RX ORDER — SODIUM CHLORIDE 9 MG/ML
2000 INJECTION INTRAMUSCULAR; INTRAVENOUS; SUBCUTANEOUS ONCE
Refills: 0 | Status: COMPLETED | OUTPATIENT
Start: 2020-03-12 | End: 2020-03-12

## 2020-03-12 RX ORDER — SODIUM CHLORIDE 9 MG/ML
1000 INJECTION INTRAMUSCULAR; INTRAVENOUS; SUBCUTANEOUS
Refills: 0 | Status: DISCONTINUED | OUTPATIENT
Start: 2020-03-12 | End: 2020-03-12

## 2020-03-12 RX ORDER — DEXTROSE MONOHYDRATE, SODIUM CHLORIDE, AND POTASSIUM CHLORIDE 50; .745; 4.5 G/1000ML; G/1000ML; G/1000ML
1000 INJECTION, SOLUTION INTRAVENOUS
Refills: 0 | Status: DISCONTINUED | OUTPATIENT
Start: 2020-03-12 | End: 2020-03-13

## 2020-03-12 RX ORDER — CIPROFLOXACIN LACTATE 400MG/40ML
400 VIAL (ML) INTRAVENOUS EVERY 12 HOURS
Refills: 0 | Status: DISCONTINUED | OUTPATIENT
Start: 2020-03-12 | End: 2020-03-15

## 2020-03-12 RX ORDER — CIPROFLOXACIN LACTATE 400MG/40ML
400 VIAL (ML) INTRAVENOUS ONCE
Refills: 0 | Status: COMPLETED | OUTPATIENT
Start: 2020-03-12 | End: 2020-03-12

## 2020-03-12 RX ORDER — ACETAMINOPHEN 500 MG
650 TABLET ORAL EVERY 6 HOURS
Refills: 0 | Status: DISCONTINUED | OUTPATIENT
Start: 2020-03-12 | End: 2020-03-15

## 2020-03-12 RX ORDER — IOHEXOL 300 MG/ML
30 INJECTION, SOLUTION INTRAVENOUS ONCE
Refills: 0 | Status: COMPLETED | OUTPATIENT
Start: 2020-03-12 | End: 2020-03-12

## 2020-03-12 RX ORDER — ONDANSETRON 8 MG/1
4 TABLET, FILM COATED ORAL EVERY 6 HOURS
Refills: 0 | Status: DISCONTINUED | OUTPATIENT
Start: 2020-03-12 | End: 2020-03-15

## 2020-03-12 RX ORDER — METRONIDAZOLE 500 MG
500 TABLET ORAL ONCE
Refills: 0 | Status: COMPLETED | OUTPATIENT
Start: 2020-03-12 | End: 2020-03-12

## 2020-03-12 RX ORDER — ALBUTEROL 90 UG/1
2 AEROSOL, METERED ORAL EVERY 6 HOURS
Refills: 0 | Status: DISCONTINUED | OUTPATIENT
Start: 2020-03-12 | End: 2020-03-15

## 2020-03-12 RX ORDER — ONDANSETRON 8 MG/1
4 TABLET, FILM COATED ORAL ONCE
Refills: 0 | Status: COMPLETED | OUTPATIENT
Start: 2020-03-12 | End: 2020-03-12

## 2020-03-12 RX ORDER — SOD SULF/SODIUM/NAHCO3/KCL/PEG
4000 SOLUTION, RECONSTITUTED, ORAL ORAL ONCE
Refills: 0 | Status: COMPLETED | OUTPATIENT
Start: 2020-03-12 | End: 2020-03-12

## 2020-03-12 RX ADMIN — Medication 4000 MILLILITER(S): at 22:08

## 2020-03-12 RX ADMIN — ONDANSETRON 4 MILLIGRAM(S): 8 TABLET, FILM COATED ORAL at 18:56

## 2020-03-12 RX ADMIN — Medication 100 MILLIGRAM(S): at 22:08

## 2020-03-12 RX ADMIN — Medication 650 MILLIGRAM(S): at 18:35

## 2020-03-12 RX ADMIN — Medication 650 MILLIGRAM(S): at 17:40

## 2020-03-12 RX ADMIN — ONDANSETRON 4 MILLIGRAM(S): 8 TABLET, FILM COATED ORAL at 05:56

## 2020-03-12 RX ADMIN — DEXTROSE MONOHYDRATE, SODIUM CHLORIDE, AND POTASSIUM CHLORIDE 150 MILLILITER(S): 50; .745; 4.5 INJECTION, SOLUTION INTRAVENOUS at 22:08

## 2020-03-12 RX ADMIN — MORPHINE SULFATE 4 MILLIGRAM(S): 50 CAPSULE, EXTENDED RELEASE ORAL at 05:56

## 2020-03-12 RX ADMIN — Medication 100 MILLIGRAM(S): at 15:22

## 2020-03-12 RX ADMIN — MORPHINE SULFATE 4 MILLIGRAM(S): 50 CAPSULE, EXTENDED RELEASE ORAL at 06:17

## 2020-03-12 RX ADMIN — DEXTROSE MONOHYDRATE, SODIUM CHLORIDE, AND POTASSIUM CHLORIDE 150 MILLILITER(S): 50; .745; 4.5 INJECTION, SOLUTION INTRAVENOUS at 16:12

## 2020-03-12 RX ADMIN — Medication 200 MILLIGRAM(S): at 05:57

## 2020-03-12 RX ADMIN — Medication 100 MILLIGRAM(S): at 17:40

## 2020-03-12 RX ADMIN — SODIUM CHLORIDE 2000 MILLILITER(S): 9 INJECTION INTRAMUSCULAR; INTRAVENOUS; SUBCUTANEOUS at 05:57

## 2020-03-12 RX ADMIN — Medication 100 MILLIGRAM(S): at 05:56

## 2020-03-12 RX ADMIN — IOHEXOL 30 MILLILITER(S): 300 INJECTION, SOLUTION INTRAVENOUS at 01:59

## 2020-03-12 NOTE — H&P ADULT - PROBLEM SELECTOR PLAN 2
IMPROVE VTE Individual Risk Assessment  RISK                                                                Points  [  ] Previous VTE                                                  3  [  ] Thrombophilia                                               2  [  ] Lower limb paralysis                                      2        (unable to hold up >15 seconds)    [  ] Current Cancer                                              2         (within 6 months)  [  ] Immobilization > 24 hrs                                1  [  ] ICU/CCU stay > 24 hours                              1  [  ] Age > 60                                                      1  IMPROVE VTE Score _0, DVT proph not recommended Albuterol PRN   not in exacerbation

## 2020-03-12 NOTE — CONSULT NOTE ADULT - PROBLEM SELECTOR RECOMMENDATION 9
c/w IV abx  r/o cdiff   send GI pcr studies  isolation precautions  NPO  IV hydration c/w IV abx  r/o cdiff   send GI pcr studies  isolation precautions  clear liquid diet  IV hydration  plan for colonoscopy tomorrow afternoon r/o IBD

## 2020-03-12 NOTE — H&P ADULT - NSHPPHYSICALEXAM_GEN_ALL_CORE
ICU Vital Signs Last 24 Hrs  T(C): 37.4 (12 Mar 2020 08:33), Max: 37.4 (12 Mar 2020 08:33)  T(F): 99.4 (12 Mar 2020 08:33), Max: 99.4 (12 Mar 2020 08:33)  HR: 97 (12 Mar 2020 08:33) (85 - 101)  BP: 96/54 (12 Mar 2020 08:33) (96/54 - 123/82)  RR: 18 (12 Mar 2020 08:33) (17 - 18)  SpO2: 100% (12 Mar 2020 08:33) (98% - 100%)      PHYSICAL EXAM:  GENERAL: female in bed   HEENT: Normocephalic;  conjunctivae and sclerae clear; moist mucous membranes;   NECK : supple  CHEST/LUNG: Clear to auscultation bilaterally with good air entry   HEART: S1 S2  regular; no murmurs, gallops or rubs  ABDOMEN: Soft, --r, Nondistended; Bowel sounds present  EXTREMITIES: no cyanosis; no edema; no calf tenderness  SKIN: warm and dry; no rash  NERVOUS SYSTEM:  Awake and alert; Oriented  to place, person and time ; no new deficits ICU Vital Signs Last 24 Hrs  T(C): 37.4 (12 Mar 2020 08:33), Max: 37.4 (12 Mar 2020 08:33)  T(F): 99.4 (12 Mar 2020 08:33), Max: 99.4 (12 Mar 2020 08:33)  HR: 97 (12 Mar 2020 08:33) (85 - 101)  BP: 96/54 (12 Mar 2020 08:33) (96/54 - 123/82)  RR: 18 (12 Mar 2020 08:33) (17 - 18)  SpO2: 100% (12 Mar 2020 08:33) (98% - 100%)      PHYSICAL EXAM:  GENERAL: female in bed   HEENT: Normocephalic;  conjunctivae and sclerae clear; moist mucous membranes;   NECK : supple  CHEST/LUNG: Clear to auscultation bilaterally with good air entry   HEART: S1 S2  regular; no murmurs, gallops or rubs  ABDOMEN: Soft, Tender in RLQ, Nondistended; Bowel sounds present  EXTREMITIES: no cyanosis; no edema; no calf tenderness  SKIN: warm and dry; no rash  NERVOUS SYSTEM:  Awake and alert; Oriented  to place, person and time ; no new deficits

## 2020-03-12 NOTE — H&P ADULT - ATTENDING COMMENTS
Pt seen and examined. Case to be discussed with housestaff  29 year old woman with hx of mild intermittent asthma presenting with 3 days of sudden onset severe RLQ abdominal pain, profuse diarrhea and high grade fever with chills and rigors. There was no hx of eating outside of the house; no exposure to pets or sick contacts. 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 No URI symptoms on ROS.  Of note, she was admitted [facility] summ[facility] to [facility] on account of similar ( less severe) symptoms during which time she developed NEHA ( pre-renal) requiring longer hospital course.    Vital Signs Last 24 Hrs  T(C): 37.4 (12 Mar 2020 08:33), Max: 37.4 (12 Mar 2020 08:33)  T(F): 99.4 (12 Mar 2020 08:33), Max: 99.4 (12 Mar 2020 08:33)  HR: 97 (12 Mar 2020 08:33) (97 - 101)  BP: 96/54 (12 Mar 2020 08:33) (96/54 - 123/82)  RR: 18 (12 Mar 2020 08:33) (18 - 18)  SpO2: 100% (12 Mar 2020 08:33) (98% - 100%)    Young woman, NAD presently, AAO X 3  CTA B/l; RRR S1S2 only  Soft TTP in most regions worse in the RLQ and suprapubic area.  No rebound or rigidity; BS hyperactive  No pedal edema  No focal deficits    Labs                        13.0   7.71  )-----------( 245      ( 12 Mar 2020 00:43 )             41.3     03-12    139  |  107  |  11  ----------------------------<  131<H>  3.5   |  25  |  0.72    Ca    8.3<L>      12 Mar 2020 00:43    TPro  6.9  /  Alb  3.1<L>  /  TBili  0.1<L>  /  DBili  x   /  AST  21  /  ALT  26  /  AlkPhos  73  03-12    CT abdomen - pancolitis with normal appendix    Impression  29 year old woman with hx as above with 2nd episode of colitis in about 6 months - this episode worse than the last. Concern for inflammatory bowel disease -rife    A/P  -  Acute pancolitis  Admit to Medicine  IVF hydration - replacement and maintenance  NPO and advance diet as tolerated  Check and correct metabolic deficits- check Mg  Empiric antibiotics - IV flagyl and ciprofloxacin.  GI consult  Pt would benefit from a  colonoscopy once episode resolved.

## 2020-03-12 NOTE — H&P ADULT - HISTORY OF PRESENT ILLNESS
29 year old woman with hx of mild intermittent asthma presenting with 3 days of sudden onset severe RLQ abdominal pain, profuse diarrhea and high grade fever with chills and rigors. There was no hx of eating outside of the house; no exposure to pets or sick contacts. 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 No URI symptoms on ROS.  Of note, she was admitted last summers to [facility] on account of similar ( less severe) symptoms during which time she developed NEHA ( pre-renal) requiring longer hospital course. 29 year old woman with PMH of asthma presented with severe RLQ pain, constant, non radiating, 8/10, no worsening or relieving factors present since Monday, with associated watery diarrhea, multiple episodes, no blood in BM. Also, reports fever upto 103.6 since Monday with chills/rigor and nausea but denies vomiting. Denies eating out, Denies any sick contact, recent travel, cough, CP, SOB.   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 No URI symptoms on ROS.  Of note, she was admitted last summers to [facility] on account of similar ( less severe) symptoms during which time she developed NEHA ( pre-renal) requiring longer hospital course.

## 2020-03-12 NOTE — H&P ADULT - ASSESSMENT
29 year old woman with PMH of asthma presented with severe RLQ pain, constant, non radiating, 8/10, no worsening or relieving factors present since Monday, with associated watery diarrhea, multiple episodes, no blood in BM. Also, reports fever upto 103.6 since Monday with chills/rigor and nausea but denies vomiting.     Admitted with Pancolitis

## 2020-03-12 NOTE — ED PROVIDER NOTE - OBJECTIVE STATEMENT
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

## 2020-03-12 NOTE — CHART NOTE - NSCHARTNOTEFT_GEN_A_CORE
Patient was seen and examined at the bedside again. Patient had a needle stick injury 2 years back and received HIV prophylaxis. Also, noted to have oral thrush few months back. Patient agreed for HIV testing. ordered in Am

## 2020-03-12 NOTE — CONSULT NOTE ADULT - ASSESSMENT
GI asked to evaluate this 29F with PMHX of Asthma who presents with c/o RLQ abdominal pain with non-bloody non-mucous diarrhea for the past 4 days. She reports having fever and chills. Patient reports having loose stools >3 several times a day. She states she has gone at lest 10 times day while in the ED. She reports having a similiar episode a year ago sx resolved and now she is same symptoms plus fever which she did not have a have a year ago. She states she never had a colonoscopy or EGD. She states her grandmother had stomach issues but she does not know exactly what her dx was. GI asked to evaluate this 29F with PMHX of Asthma who presents with c/o RLQ abdominal pain with non-bloody non-mucous diarrhea for the past 4 days. She reports having fever and chills. + nausea, no vomiting.  Patient reports having loose stools >3 several times a day. She states she has gone at least 10 times today while in the ED. She reports having a similiar episode a year ago sx resolved and now she is having same symptoms plus fever which she did not have a have a year ago. She states she never has had a colonoscopy or EGD. She states her grandmother had stomach issues but she does not know exactly what her dx was. She denies recent abx use. 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

## 2020-03-12 NOTE — CHART NOTE - NSCHARTNOTEFT_GEN_A_CORE
Patient was seen and examined at bedside in ED today  All questions and concerns answered  Waiting for stool sample to be sent for C. Diff  Will cont IV antibiotics, IVF, NPO for now   GI consult appreciated

## 2020-03-12 NOTE — CONSULT NOTE ADULT - SUBJECTIVE AND OBJECTIVE BOX
[facility]    Patient is a 29y old  Female who presents with a chief complaint of Nausea, vomiting, diarrhea (12 Mar 2020 08:30)    HPI:  29 year old woman with PMH of asthma presented with severe RLQ pain, constant, non radiating, 8/10, no worsening or relieving factors present since Monday, with associated watery diarrhea, multiple episodes, no blood in BM. Also, reports fever upto 103.6 since Monday with chills/rigor and nausea but denies vomiting. Denies eating out, Denies any sick contact, recent travel, cough, CP, SOB.   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 No URI symptoms on ROS.  Of note, she was admitted last summers to [facility] on account of similar ( less severe) symptoms during which time she developed NEHA ( pre-renal) requiring longer hospital course. (12 Mar 2020 08:30)    PMH/PSH:  PAST MEDICAL & SURGICAL HISTORY:  Asthma  No significant past surgical history    FH:  FAMILY HISTORY:  No pertinent family history in first degree relatives      MEDS:  MEDICATIONS  (STANDING):  ciprofloxacin   IVPB 400 milliGRAM(s) IV Intermittent every 12 hours  metroNIDAZOLE  IVPB 500 milliGRAM(s) IV Intermittent every 8 hours  sodium chloride 0.9% with potassium chloride 20 mEq/L 1000 milliLiter(s) (150 mL/Hr) IV Continuous <Continuous>    MEDICATIONS  (PRN):  acetaminophen   Tablet .. 650 milliGRAM(s) Oral every 6 hours PRN Temp greater or equal to 38C (100.4F), Mild Pain (1 - 3)  ALBUTerol    90 MICROgram(s) HFA Inhaler 2 Puff(s) Inhalation every 6 hours PRN Bronchospasm  ondansetron Injectable 4 milliGRAM(s) IV Push every 6 hours PRN Nausea and/or Vomiting    Allergies    No Known Allergies    Intolerances            CONSTITUTIONAL:  No weight loss, fever, chills, weakness or fatigue.  HEENT:  Eyes:  No visual loss, blurred vision, double vision or yellow sclerae. Ears, Nose, Throat:  No hearing loss, sneezing, congestion, runny nose or sore throat.  SKIN:  No rash or itching.  CARDIOVASCULAR:  No chest pain, chest pressure or chest discomfort. No palpitations or edema.  RESPIRATORY:  No shortness of breath, cough or sputum.  GASTROINTESTINAL:  SEE HPI  GENITOURINARY:  No dysuria, hematuria, urinary frequency  NEUROLOGICAL:  No headache, dizziness, syncope, paralysis, ataxia, numbness or tingling in the extremities. No change in bowel or bladder control.  MUSCULOSKELETAL:  No muscle, back pain, joint pain or stiffness.  HEMATOLOGIC:  No anemia, bleeding or bruising.  LYMPHATICS:  No enlarged nodes. No history of splenectomy.  PSYCHIATRIC:  No history of depression or anxiety.  ENDOCRINOLOGIC:  No reports of sweating, cold or heat intolerance. No polyuria or polydipsia.      ______________________________________________________________________  PHYSICAL EXAM:  T(C): 36.6 (03-12-20 @ 11:00), Max: 37.4 (03-12-20 @ 08:33)  HR: 89 (03-12-20 @ 11:00)  BP: 100/64 (03-12-20 @ 11:00)  RR: 18 (03-12-20 @ 11:00)  SpO2: 99% (03-12-20 @ 11:00)  Wt(kg): --      GEN: NAD, normocephalic  CVS: S1S2+  CHEST: clear to auscultation  ABD: soft , + tenderness to RLQ and mid epigastric region on exam, nondistended, bowel sounds present  EXTR: no cyanosis, no clubbing, no edema  NEURO: Awake and alert; oriented to person, place, time, and situation   SKIN:  warm;  non icteric    ______________________________________________________________________  LABS:                        13.0   7.71  )-----------( 245      ( 12 Mar 2020 00:43 )             41.3     03-12    139  |  110<H>  |  5<L>  ----------------------------<  92  3.3<L>   |  22  |  0.62    Ca    7.4<L>      12 Mar 2020 11:08    TPro  6.9  /  Alb  3.1<L>  /  TBili  0.1<L>  /  DBili  x   /  AST  21  /  ALT  26  /  AlkPhos  73  03-12    LIVER FUNCTIONS - ( 12 Mar 2020 00:43 )  Alb: 3.1 g/dL / Pro: 6.9 g/dL / ALK PHOS: 73 U/L / ALT: 26 U/L DA / AST: 21 U/L / GGT: x             ____________________________________________    IMAGING:    CT A/P - Wall thickening of the ascending, transverse, descending and sigmoid colon with surrounding inflammatory change consistent with a pancolitis. There is no bowel obstruction. The appendix is normal.

## 2020-03-12 NOTE — H&P ADULT - PROBLEM SELECTOR PLAN 3
IMPROVE VTE Individual Risk Assessment  RISK                                                                Points  [  ] Previous VTE                                                  3  [  ] Thrombophilia                                               2  [  ] Lower limb paralysis                                      2        (unable to hold up >15 seconds)    [  ] Current Cancer                                              2         (within 6 months)  [  ] Immobilization > 24 hrs                                1  [  ] ICU/CCU stay > 24 hours                              1  [  ] Age > 60                                                      1  IMPROVE VTE Score _0, DVT proph not recommended

## 2020-03-12 NOTE — H&P ADULT - PROBLEM SELECTOR PLAN 1
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

## 2020-03-13 ENCOUNTER — RESULT REVIEW (OUTPATIENT)
Age: 30
End: 2020-03-13

## 2020-03-13 LAB
24R-OH-CALCIDIOL SERPL-MCNC: 13.7 NG/ML — LOW (ref 30–80)
ALBUMIN SERPL ELPH-MCNC: 2.6 G/DL — LOW (ref 3.5–5)
ALP SERPL-CCNC: 46 U/L — SIGNIFICANT CHANGE UP (ref 40–120)
ALT FLD-CCNC: 18 U/L DA — SIGNIFICANT CHANGE UP (ref 10–60)
ANION GAP SERPL CALC-SCNC: 7 MMOL/L — SIGNIFICANT CHANGE UP (ref 5–17)
ANION GAP SERPL CALC-SCNC: 9 MMOL/L — SIGNIFICANT CHANGE UP (ref 5–17)
AST SERPL-CCNC: 15 U/L — SIGNIFICANT CHANGE UP (ref 10–40)
BASOPHILS # BLD AUTO: 0.02 K/UL — SIGNIFICANT CHANGE UP (ref 0–0.2)
BASOPHILS NFR BLD AUTO: 0.6 % — SIGNIFICANT CHANGE UP (ref 0–2)
BILIRUB SERPL-MCNC: 0.2 MG/DL — SIGNIFICANT CHANGE UP (ref 0.2–1.2)
BUN SERPL-MCNC: 4 MG/DL — LOW (ref 7–18)
BUN SERPL-MCNC: 4 MG/DL — LOW (ref 7–18)
C DIFF BY PCR RESULT: SIGNIFICANT CHANGE UP
C DIFF TOX GENS STL QL NAA+PROBE: SIGNIFICANT CHANGE UP
C TRACH RRNA SPEC QL NAA+PROBE: SIGNIFICANT CHANGE UP
CALCIUM SERPL-MCNC: 7.6 MG/DL — LOW (ref 8.4–10.5)
CALCIUM SERPL-MCNC: 8.4 MG/DL — SIGNIFICANT CHANGE UP (ref 8.4–10.5)
CHLORIDE SERPL-SCNC: 110 MMOL/L — HIGH (ref 96–108)
CHLORIDE SERPL-SCNC: 111 MMOL/L — HIGH (ref 96–108)
CHOLEST SERPL-MCNC: 130 MG/DL — SIGNIFICANT CHANGE UP (ref 10–199)
CO2 SERPL-SCNC: 22 MMOL/L — SIGNIFICANT CHANGE UP (ref 22–31)
CO2 SERPL-SCNC: 24 MMOL/L — SIGNIFICANT CHANGE UP (ref 22–31)
CREAT SERPL-MCNC: 0.58 MG/DL — SIGNIFICANT CHANGE UP (ref 0.5–1.3)
CREAT SERPL-MCNC: 0.6 MG/DL — SIGNIFICANT CHANGE UP (ref 0.5–1.3)
CRP SERPL-MCNC: 5.14 MG/DL — HIGH (ref 0–0.4)
CULTURE RESULTS: NO GROWTH — SIGNIFICANT CHANGE UP
CULTURE RESULTS: SIGNIFICANT CHANGE UP
EOSINOPHIL # BLD AUTO: 0.17 K/UL — SIGNIFICANT CHANGE UP (ref 0–0.5)
EOSINOPHIL NFR BLD AUTO: 4.7 % — SIGNIFICANT CHANGE UP (ref 0–6)
ERYTHROCYTE [SEDIMENTATION RATE] IN BLOOD: 14 MM/HR — SIGNIFICANT CHANGE UP (ref 0–15)
FOLATE SERPL-MCNC: 18.5 NG/ML — SIGNIFICANT CHANGE UP
GLUCOSE SERPL-MCNC: 84 MG/DL — SIGNIFICANT CHANGE UP (ref 70–99)
GLUCOSE SERPL-MCNC: 87 MG/DL — SIGNIFICANT CHANGE UP (ref 70–99)
HBA1C BLD-MCNC: 5.5 % — SIGNIFICANT CHANGE UP (ref 4–5.6)
HCT VFR BLD CALC: 33.7 % — LOW (ref 34.5–45)
HCT VFR BLD CALC: 34.3 % — LOW (ref 34.5–45)
HDLC SERPL-MCNC: 60 MG/DL — SIGNIFICANT CHANGE UP
HGB BLD-MCNC: 10.8 G/DL — LOW (ref 11.5–15.5)
HGB BLD-MCNC: 10.9 G/DL — LOW (ref 11.5–15.5)
HIV 1+2 AB+HIV1 P24 AG SERPL QL IA: SIGNIFICANT CHANGE UP
IMM GRANULOCYTES NFR BLD AUTO: 0.3 % — SIGNIFICANT CHANGE UP (ref 0–1.5)
IRON SATN MFR SERPL: 38 UG/DL — LOW (ref 40–150)
IRON SATN MFR SERPL: 9 % — LOW (ref 15–50)
LIPID PNL WITH DIRECT LDL SERPL: 50 MG/DL — SIGNIFICANT CHANGE UP
LYMPHOCYTES # BLD AUTO: 1.63 K/UL — SIGNIFICANT CHANGE UP (ref 1–3.3)
LYMPHOCYTES # BLD AUTO: 45.5 % — HIGH (ref 13–44)
MAGNESIUM SERPL-MCNC: 2.1 MG/DL — SIGNIFICANT CHANGE UP (ref 1.6–2.6)
MCHC RBC-ENTMCNC: 26 PG — LOW (ref 27–34)
MCHC RBC-ENTMCNC: 26.3 PG — LOW (ref 27–34)
MCHC RBC-ENTMCNC: 31.8 GM/DL — LOW (ref 32–36)
MCHC RBC-ENTMCNC: 32 GM/DL — SIGNIFICANT CHANGE UP (ref 32–36)
MCV RBC AUTO: 81.7 FL — SIGNIFICANT CHANGE UP (ref 80–100)
MCV RBC AUTO: 82 FL — SIGNIFICANT CHANGE UP (ref 80–100)
MONOCYTES # BLD AUTO: 0.52 K/UL — SIGNIFICANT CHANGE UP (ref 0–0.9)
MONOCYTES NFR BLD AUTO: 14.5 % — HIGH (ref 2–14)
N GONORRHOEA RRNA SPEC QL NAA+PROBE: SIGNIFICANT CHANGE UP
NEUTROPHILS # BLD AUTO: 1.23 K/UL — LOW (ref 1.8–7.4)
NEUTROPHILS NFR BLD AUTO: 34.4 % — LOW (ref 43–77)
NRBC # BLD: 0 /100 WBCS — SIGNIFICANT CHANGE UP (ref 0–0)
NRBC # BLD: 0 /100 WBCS — SIGNIFICANT CHANGE UP (ref 0–0)
PHOSPHATE SERPL-MCNC: 3.2 MG/DL — SIGNIFICANT CHANGE UP (ref 2.5–4.5)
PLATELET # BLD AUTO: 212 K/UL — SIGNIFICANT CHANGE UP (ref 150–400)
PLATELET # BLD AUTO: 221 K/UL — SIGNIFICANT CHANGE UP (ref 150–400)
POTASSIUM SERPL-MCNC: 3.3 MMOL/L — LOW (ref 3.5–5.3)
POTASSIUM SERPL-MCNC: 3.4 MMOL/L — LOW (ref 3.5–5.3)
POTASSIUM SERPL-SCNC: 3.3 MMOL/L — LOW (ref 3.5–5.3)
POTASSIUM SERPL-SCNC: 3.4 MMOL/L — LOW (ref 3.5–5.3)
PROT SERPL-MCNC: 6 G/DL — SIGNIFICANT CHANGE UP (ref 6–8.3)
RBC # BLD: 4.11 M/UL — SIGNIFICANT CHANGE UP (ref 3.8–5.2)
RBC # BLD: 4.2 M/UL — SIGNIFICANT CHANGE UP (ref 3.8–5.2)
RBC # FLD: 16.1 % — HIGH (ref 10.3–14.5)
RBC # FLD: 16.2 % — HIGH (ref 10.3–14.5)
SODIUM SERPL-SCNC: 141 MMOL/L — SIGNIFICANT CHANGE UP (ref 135–145)
SODIUM SERPL-SCNC: 142 MMOL/L — SIGNIFICANT CHANGE UP (ref 135–145)
SPECIMEN SOURCE: SIGNIFICANT CHANGE UP
TIBC SERPL-MCNC: 424 UG/DL — SIGNIFICANT CHANGE UP (ref 250–450)
TOTAL CHOLESTEROL/HDL RATIO MEASUREMENT: 2.2 RATIO — LOW (ref 3.3–7.1)
TRIGL SERPL-MCNC: 100 MG/DL — SIGNIFICANT CHANGE UP (ref 10–149)
TSH SERPL-MCNC: 0.53 UU/ML — SIGNIFICANT CHANGE UP (ref 0.34–4.82)
UIBC SERPL-MCNC: 386 UG/DL — HIGH (ref 110–370)
VIT B12 SERPL-MCNC: 514 PG/ML — SIGNIFICANT CHANGE UP (ref 232–1245)
WBC # BLD: 3.38 K/UL — LOW (ref 3.8–10.5)
WBC # BLD: 3.58 K/UL — LOW (ref 3.8–10.5)
WBC # FLD AUTO: 3.38 K/UL — LOW (ref 3.8–10.5)
WBC # FLD AUTO: 3.58 K/UL — LOW (ref 3.8–10.5)

## 2020-03-13 PROCEDURE — 88305 TISSUE EXAM BY PATHOLOGIST: CPT | Mod: 26

## 2020-03-13 PROCEDURE — 45380 COLONOSCOPY AND BIOPSY: CPT

## 2020-03-13 PROCEDURE — 99233 SBSQ HOSP IP/OBS HIGH 50: CPT | Mod: GC

## 2020-03-13 RX ORDER — POTASSIUM CHLORIDE 20 MEQ
40 PACKET (EA) ORAL ONCE
Refills: 0 | Status: DISCONTINUED | OUTPATIENT
Start: 2020-03-13 | End: 2020-03-13

## 2020-03-13 RX ORDER — POTASSIUM CHLORIDE 20 MEQ
40 PACKET (EA) ORAL EVERY 4 HOURS
Refills: 0 | Status: COMPLETED | OUTPATIENT
Start: 2020-03-13 | End: 2020-03-14

## 2020-03-13 RX ORDER — ERGOCALCIFEROL 1.25 MG/1
50000 CAPSULE ORAL
Refills: 0 | Status: DISCONTINUED | OUTPATIENT
Start: 2020-03-13 | End: 2020-03-15

## 2020-03-13 RX ORDER — DEXTROSE MONOHYDRATE, SODIUM CHLORIDE, AND POTASSIUM CHLORIDE 50; .745; 4.5 G/1000ML; G/1000ML; G/1000ML
1000 INJECTION, SOLUTION INTRAVENOUS
Refills: 0 | Status: DISCONTINUED | OUTPATIENT
Start: 2020-03-13 | End: 2020-03-15

## 2020-03-13 RX ADMIN — Medication 40 MILLIEQUIVALENT(S): at 21:14

## 2020-03-13 RX ADMIN — Medication 100 MILLIGRAM(S): at 21:14

## 2020-03-13 RX ADMIN — Medication 40 MILLIEQUIVALENT(S): at 20:05

## 2020-03-13 RX ADMIN — ONDANSETRON 4 MILLIGRAM(S): 8 TABLET, FILM COATED ORAL at 09:08

## 2020-03-13 RX ADMIN — Medication 100 MILLIGRAM(S): at 06:08

## 2020-03-13 RX ADMIN — Medication 100 MILLIGRAM(S): at 18:40

## 2020-03-13 RX ADMIN — ERGOCALCIFEROL 50000 UNIT(S): 1.25 CAPSULE ORAL at 18:40

## 2020-03-13 RX ADMIN — Medication 100 MILLIGRAM(S): at 06:07

## 2020-03-13 RX ADMIN — DEXTROSE MONOHYDRATE, SODIUM CHLORIDE, AND POTASSIUM CHLORIDE 150 MILLILITER(S): 50; .745; 4.5 INJECTION, SOLUTION INTRAVENOUS at 06:07

## 2020-03-13 RX ADMIN — ONDANSETRON 4 MILLIGRAM(S): 8 TABLET, FILM COATED ORAL at 21:14

## 2020-03-13 RX ADMIN — DEXTROSE MONOHYDRATE, SODIUM CHLORIDE, AND POTASSIUM CHLORIDE 150 MILLILITER(S): 50; .745; 4.5 INJECTION, SOLUTION INTRAVENOUS at 09:05

## 2020-03-13 NOTE — PROGRESS NOTE ADULT - SUBJECTIVE AND OBJECTIVE BOX
PGY 1 Note discussed with primary attending    Patient is a 29y old  Female who presents with a chief complaint of Nausea, vomiting, diarrhea (12 Mar 2020 13:04)      INTERVAL HPI/OVERNIGHT EVENTS:    Pt was admitted for watery diarrhea, CT abdomen shows colitis.  Hemodynamically stable, being prepped for colonoscopy today  Having watery stools due to golytely    MEDICATIONS  (STANDING):  ciprofloxacin   IVPB 400 milliGRAM(s) IV Intermittent every 12 hours  metroNIDAZOLE  IVPB 500 milliGRAM(s) IV Intermittent every 8 hours  sodium chloride 0.9% with potassium chloride 20 mEq/L 1000 milliLiter(s) (150 mL/Hr) IV Continuous <Continuous>    MEDICATIONS  (PRN):  acetaminophen   Tablet .. 650 milliGRAM(s) Oral every 6 hours PRN Temp greater or equal to 38C (100.4F), Mild Pain (1 - 3)  ALBUTerol    90 MICROgram(s) HFA Inhaler 2 Puff(s) Inhalation every 6 hours PRN Bronchospasm  ondansetron Injectable 4 milliGRAM(s) IV Push every 6 hours PRN Nausea and/or Vomiting      __________________________________________________  REVIEW OF SYSTEMS:    CONSTITUTIONAL: No fever,   EYES: no acute visual disturbances  NECK: No pain or stiffness  RESPIRATORY: No cough; No shortness of breath  CARDIOVASCULAR: No chest pain, no palpitations  GASTROINTESTINAL: No pain. No nausea or vomiting; No diarrhea   NEUROLOGICAL: No headache or numbness, no tremors  MUSCULOSKELETAL: No joint pain, no muscle pain  GENITOURINARY: no dysuria, no frequency, no hesitancy  PSYCHIATRY: no depression , no anxiety  ALL OTHER  ROS negative        Vital Signs Last 24 Hrs  T(C): 36.4 (13 Mar 2020 08:17), Max: 37.8 (13 Mar 2020 00:39)  T(F): 97.6 (13 Mar 2020 08:17), Max: 100 (13 Mar 2020 00:39)  HR: 89 (13 Mar 2020 08:17) (79 - 89)  BP: 115/69 (13 Mar 2020 08:17) (100/64 - 115/69)  BP(mean): --  RR: 16 (13 Mar 2020 08:17) (14 - 18)  SpO2: 98% (13 Mar 2020 08:17) (98% - 100%)    ________________________________________________  PHYSICAL EXAM:  GENERAL: NAD  HEENT: Normocephalic;  conjunctivae and sclerae clear; moist mucous membranes;   NECK : supple  CHEST/LUNG: Clear to auscultation bilaterally with good air entry   HEART: S1 S2  regular; no murmurs, gallops or rubs  ABDOMEN: Soft, Nontender, Nondistended; Bowel sounds present  EXTREMITIES: no cyanosis; no edema; no calf tenderness  SKIN: warm and dry; no rash  NERVOUS SYSTEM:  Awake and alert; Oriented  to place, person and time ; no new deficits    _________________________________________________  LABS:                        10.9   3.58  )-----------( 221      ( 13 Mar 2020 06:11 )             34.3     03-13    141  |  110<H>  |  4<L>  ----------------------------<  84  3.4<L>   |  24  |  0.58    Ca    8.4      13 Mar 2020 06:11  Phos  3.2     03-13  Mg     2.1     03-13    TPro  6.0  /  Alb  2.6<L>  /  TBili  0.2  /  DBili  x   /  AST  15  /  ALT  18  /  AlkPhos  46  03-13      Urinalysis Basic - ( 12 Mar 2020 00:43 )    Color: Yellow / Appearance: Clear / S.025 / pH: x  Gluc: x / Ketone: Negative  / Bili: Negative / Urobili: Negative   Blood: x / Protein: 30 mg/dL / Nitrite: Negative   Leuk Esterase: Negative / RBC: 2-5 /HPF / WBC 0-2 /HPF   Sq Epi: x / Non Sq Epi: Few /HPF / Bacteria: Few /HPF      CAPILLARY BLOOD GLUCOSE            RADIOLOGY & ADDITIONAL TESTS:    Imaging Personally Reviewed:  YES    Consultant(s) Notes Reviewed:   YES    Care Discussed with Consultants :     Plan of care was discussed with patient and /or primary care giver; all questions and concerns were addressed and care was aligned with patient's wishes.

## 2020-03-13 NOTE — PROGRESS NOTE ADULT - ATTENDING COMMENTS
29y old  Female who presents with a chief complaint of Nausea, vomiting, diarrhea     Today  Patient was seen and examined before colonoscopy at bedside   Still complains of abd pain     REVIEW OF SYSTEMS: denies fever, chills, SOB, palpitations, chest pain    PHYSICAL EXAM:  GENERAL: in mild distress due to pain   NERVOUS SYSTEM:  Alert & Oriented X3, Good concentration; Motor Strength 5/5 B/L upper and lower extremities  CHEST/LUNG: Clear to auscultation bilaterally; No rales, rhonchi, wheezing, or rubs  HEART: Regular rate and rhythm; No murmurs, rubs, or gallops  ABDOMEN: Soft, tender- more in RLQ, Nondistended; Bowel sounds present  EXTREMITIES:  2+ Peripheral Pulses, No clubbing, cyanosis, or edema  SKIN: No rashes or lesions    Assessment and plan:   1. Pancolitis  2. Iron deficiency anemia   3. Vitamin d deficiency   4. Mild intermittent Asthma     Plan:  S/p Colonoscopy, unclear type of colitis as per GI note  Will obtain CT angio   Clear liquid diet   IVF   Supplement Iron and Vitamin D   Cont Cipro and Flagyl   Monitor Sugar and electrolytes 29y old  Female who presents with a chief complaint of Nausea, vomiting, diarrhea     Today  Patient was seen and examined before colonoscopy at bedside   Still complains of abd pain     REVIEW OF SYSTEMS: denies fever, chills, SOB, palpitations, chest pain    PHYSICAL EXAM:  GENERAL: in mild distress due to pain   NERVOUS SYSTEM:  Alert & Oriented X3, Good concentration; Motor Strength 5/5 B/L upper and lower extremities  CHEST/LUNG: Clear to auscultation bilaterally; No rales, rhonchi, wheezing, or rubs  HEART: Regular rate and rhythm; No murmurs, rubs, or gallops  ABDOMEN: Soft, tender- more in RLQ, Nondistended; Bowel sounds present  EXTREMITIES:  2+ Peripheral Pulses, No clubbing, cyanosis, or edema  SKIN: No rashes or lesions    Assessment and plan:   1. Pancolitis  2. Iron deficiency anemia   3. Vitamin d deficiency   4. Mild intermittent Asthma   5. Leukopenia     Plan:  S/p Colonoscopy, unclear type of colitis as per GI note  Will obtain CT angio   Clear liquid diet   IVF   Supplement Iron and Vitamin D   Cont Cipro and Flagyl   Monitor Sugar and electrolytes  Monitor CBC, obtain B12, Folate, LDH level

## 2020-03-13 NOTE — PROGRESS NOTE ADULT - SUBJECTIVE AND OBJECTIVE BOX
GI PROGRESS NOTE    Patient is a 29y old  Female who presents with a chief complaint of Nausea, vomiting, diarrhea (13 Mar 2020 10:33)      HPI:  29 year old woman with PMH of asthma presented with severe RLQ pain, constant, non radiating, 8/10, no worsening or relieving factors present since Monday, with associated watery diarrhea, multiple episodes, no blood in BM. Also, reports fever upto 103.6 since Monday with chills/rigor and nausea but denies vomiting. Denies eating out, Denies any sick contact, recent travel, cough, CP, SOB.   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 No URI symptoms on ROS.  Of note, she was admitted last summers to [facility] on account of similar ( less severe) symptoms during which time she developed NEHA ( pre-renal) requiring longer hospital course. (12 Mar 2020 08:30)  S/p colon and findings of maybe subtle colitis in sigmoid, transverse and rectum with ulcer on IC valve          ______________________________________________________________________  PMH/PSH:  PAST MEDICAL & SURGICAL HISTORY:  Asthma  No significant past surgical history    ______________________________________________________________________  MEDS:  MEDICATIONS  (STANDING):  ciprofloxacin   IVPB 400 milliGRAM(s) IV Intermittent every 12 hours  ergocalciferol 78163 Unit(s) Oral <User Schedule>  metroNIDAZOLE  IVPB 500 milliGRAM(s) IV Intermittent every 8 hours  potassium chloride    Tablet ER 40 milliEquivalent(s) Oral every 4 hours  sodium chloride 0.9% with potassium chloride 20 mEq/L 1000 milliLiter(s) (150 mL/Hr) IV Continuous <Continuous>    MEDICATIONS  (PRN):  acetaminophen   Tablet .. 650 milliGRAM(s) Oral every 6 hours PRN Temp greater or equal to 38C (100.4F), Mild Pain (1 - 3)  ALBUTerol    90 MICROgram(s) HFA Inhaler 2 Puff(s) Inhalation every 6 hours PRN Bronchospasm  ondansetron Injectable 4 milliGRAM(s) IV Push every 6 hours PRN Nausea and/or Vomiting    ______________________________________________________________________  ALL:   Allergies    No Known Allergies    Intolerances      ______________________________________________________________________  SH: Social History:  from home  Works as a nurse  occasional alcohol intake   non smoker (12 Mar 2020 08:30)    ______________________________________________________________________  FH:  FAMILY HISTORY:  No pertinent family history in first degree relatives    ______________________________________________________________________  ROS:    CONSTITUTIONAL:  No weight loss, fever, chills, weakness or fatigue.    HEENT:  Eyes:  No visual loss, blurred vision, double vision or yellow sclerae. Ears, Nose, Throat:  No hearing loss, sneezing, congestion, runny nose or sore throat.    SKIN:  No rash or itching.    CARDIOVASCULAR:  No chest pain, chest pressure or chest discomfort. No palpitations or edema.    RESPIRATORY:  No shortness of breath, cough or sputum.    GASTROINTESTINAL:  SEE HPI    GENITOURINARY:  No dysuria, hematuria, urinary frequency    NEUROLOGICAL:  No headache, dizziness, syncope, paralysis, ataxia, numbness or tingling in the extremities. No change in bowel or bladder control.    MUSCULOSKELETAL:  No muscle, back pain, joint pain or stiffness.    HEMATOLOGIC:  No anemia, bleeding or bruising.    LYMPHATICS:  No enlarged nodes. No history of splenectomy.    PSYCHIATRIC:  No history of depression or anxiety.    ENDOCRINOLOGIC:  No reports of sweating, cold or heat intolerance. No polyuria or polydipsia.    ALLERGIES:  No history of asthma, hives, eczema or rhinitis.  ______________________________________________________________________  PHYSICAL EXAM:  T(C): 36.4 (03-13-20 @ 15:32), Max: 37.8 (03-13-20 @ 00:39)  HR: 78 (03-13-20 @ 15:32)  BP: 101/66 (03-13-20 @ 15:32)  RR: 16 (03-13-20 @ 15:32)  SpO2: 100% (03-13-20 @ 15:32)  Wt(kg): --      GEN: NAD   CVS- S1 S2  ABD: soft nontender, non distended, bowel sounds+  LUNGS: clear to auscultation  NEURO: noin focal neuro exam; AAO x 3  Extremities: no cyanosis, no calf tenderness, no edema, no clubbing      ______________________________________________________________________  LABS:                        10.8   3.38  )-----------( 212      ( 13 Mar 2020 12:52 )             33.7     03-13    142  |  111<H>  |  4<L>  ----------------------------<  87  3.3<L>   |  22  |  0.60    Ca    7.6<L>      13 Mar 2020 15:50  Phos  3.2     03-13  Mg     2.1     03-13    TPro  6.0  /  Alb  2.6<L>  /  TBili  0.2  /  DBili  x   /  AST  15  /  ALT  18  /  AlkPhos  46  03-13    LIVER FUNCTIONS - ( 13 Mar 2020 06:11 )  Alb: 2.6 g/dL / Pro: 6.0 g/dL / ALK PHOS: 46 U/L / ALT: 18 U/L DA / AST: 15 U/L / GGT: x           ______________________________________________________________________  IMAGING:    ______________________________________________________________________  ASSESSMENT:  29y Female    PLAN:

## 2020-03-14 LAB
ANION GAP SERPL CALC-SCNC: 7 MMOL/L — SIGNIFICANT CHANGE UP (ref 5–17)
BASOPHILS # BLD AUTO: 0 K/UL — SIGNIFICANT CHANGE UP (ref 0–0.2)
BASOPHILS NFR BLD AUTO: 0 % — SIGNIFICANT CHANGE UP (ref 0–2)
BUN SERPL-MCNC: 3 MG/DL — LOW (ref 7–18)
CALCIUM SERPL-MCNC: 7.8 MG/DL — LOW (ref 8.4–10.5)
CHLORIDE SERPL-SCNC: 112 MMOL/L — HIGH (ref 96–108)
CO2 SERPL-SCNC: 22 MMOL/L — SIGNIFICANT CHANGE UP (ref 22–31)
CREAT SERPL-MCNC: 0.59 MG/DL — SIGNIFICANT CHANGE UP (ref 0.5–1.3)
EOSINOPHIL # BLD AUTO: 0.21 K/UL — SIGNIFICANT CHANGE UP (ref 0–0.5)
EOSINOPHIL NFR BLD AUTO: 6 % — SIGNIFICANT CHANGE UP (ref 0–6)
GLUCOSE SERPL-MCNC: 95 MG/DL — SIGNIFICANT CHANGE UP (ref 70–99)
HCT VFR BLD CALC: 33 % — LOW (ref 34.5–45)
HGB BLD-MCNC: 10.5 G/DL — LOW (ref 11.5–15.5)
LDH SERPL L TO P-CCNC: 201 U/L — SIGNIFICANT CHANGE UP (ref 120–225)
LYMPHOCYTES # BLD AUTO: 1.56 K/UL — SIGNIFICANT CHANGE UP (ref 1–3.3)
LYMPHOCYTES # BLD AUTO: 45 % — HIGH (ref 13–44)
MANUAL SMEAR VERIFICATION: SIGNIFICANT CHANGE UP
MCHC RBC-ENTMCNC: 26 PG — LOW (ref 27–34)
MCHC RBC-ENTMCNC: 31.8 GM/DL — LOW (ref 32–36)
MCV RBC AUTO: 81.7 FL — SIGNIFICANT CHANGE UP (ref 80–100)
METAMYELOCYTES # FLD: 1 % — HIGH (ref 0–0)
MONOCYTES # BLD AUTO: 0.24 K/UL — SIGNIFICANT CHANGE UP (ref 0–0.9)
MONOCYTES NFR BLD AUTO: 7 % — SIGNIFICANT CHANGE UP (ref 2–14)
MYELOCYTES NFR BLD: 1 % — HIGH (ref 0–0)
NEUTROPHILS # BLD AUTO: 1.38 K/UL — LOW (ref 1.8–7.4)
NEUTROPHILS NFR BLD AUTO: 40 % — LOW (ref 43–77)
NRBC # BLD: 0 /100 — SIGNIFICANT CHANGE UP (ref 0–0)
PLAT MORPH BLD: NORMAL — SIGNIFICANT CHANGE UP
PLATELET # BLD AUTO: 219 K/UL — SIGNIFICANT CHANGE UP (ref 150–400)
POTASSIUM SERPL-MCNC: 4.2 MMOL/L — SIGNIFICANT CHANGE UP (ref 3.5–5.3)
POTASSIUM SERPL-SCNC: 4.2 MMOL/L — SIGNIFICANT CHANGE UP (ref 3.5–5.3)
RBC # BLD: 4.04 M/UL — SIGNIFICANT CHANGE UP (ref 3.8–5.2)
RBC # FLD: 16.1 % — HIGH (ref 10.3–14.5)
RBC BLD AUTO: NORMAL — SIGNIFICANT CHANGE UP
SODIUM SERPL-SCNC: 141 MMOL/L — SIGNIFICANT CHANGE UP (ref 135–145)
TRANSFERRIN SERPL-MCNC: 346 MG/DL — SIGNIFICANT CHANGE UP (ref 200–360)
WBC # BLD: 3.46 K/UL — LOW (ref 3.8–10.5)
WBC # FLD AUTO: 3.46 K/UL — LOW (ref 3.8–10.5)

## 2020-03-14 PROCEDURE — 99233 SBSQ HOSP IP/OBS HIGH 50: CPT | Mod: GC

## 2020-03-14 PROCEDURE — 74174 CTA ABD&PLVS W/CONTRAST: CPT | Mod: 26

## 2020-03-14 RX ORDER — FERROUS SULFATE 325(65) MG
325 TABLET ORAL DAILY
Refills: 0 | Status: DISCONTINUED | OUTPATIENT
Start: 2020-03-14 | End: 2020-03-15

## 2020-03-14 RX ADMIN — DEXTROSE MONOHYDRATE, SODIUM CHLORIDE, AND POTASSIUM CHLORIDE 150 MILLILITER(S): 50; .745; 4.5 INJECTION, SOLUTION INTRAVENOUS at 01:00

## 2020-03-14 RX ADMIN — Medication 100 MILLIGRAM(S): at 14:08

## 2020-03-14 RX ADMIN — Medication 325 MILLIGRAM(S): at 17:54

## 2020-03-14 RX ADMIN — Medication 100 MILLIGRAM(S): at 22:26

## 2020-03-14 RX ADMIN — Medication 100 MILLIGRAM(S): at 05:29

## 2020-03-14 RX ADMIN — Medication 100 MILLIGRAM(S): at 17:55

## 2020-03-14 RX ADMIN — Medication 40 MILLIEQUIVALENT(S): at 04:48

## 2020-03-14 RX ADMIN — ONDANSETRON 4 MILLIGRAM(S): 8 TABLET, FILM COATED ORAL at 05:29

## 2020-03-14 NOTE — PROGRESS NOTE ADULT - SUBJECTIVE AND OBJECTIVE BOX
PGY 1 Note discussed with primary attending    Patient is a 29y old  Female who presents with a chief complaint of Nausea, vomiting, diarrhea (13 Mar 2020 17:39)      INTERVAL HPI/OVERNIGHT EVENTS:   No acute event overnight reported by overnight team and nurses. Pt remained hemodynamically stable.  Pt seen and examined  at bed side. All concerns and questions answered.   Report no new complaint.   Diarrhea improving  Potassium normal and Colonoscopy done, shows colitis    MEDICATIONS  (STANDING):  ciprofloxacin   IVPB 400 milliGRAM(s) IV Intermittent every 12 hours  ergocalciferol 70146 Unit(s) Oral <User Schedule>  metroNIDAZOLE  IVPB 500 milliGRAM(s) IV Intermittent every 8 hours  sodium chloride 0.9% with potassium chloride 20 mEq/L 1000 milliLiter(s) (150 mL/Hr) IV Continuous <Continuous>    MEDICATIONS  (PRN):  acetaminophen   Tablet .. 650 milliGRAM(s) Oral every 6 hours PRN Temp greater or equal to 38C (100.4F), Mild Pain (1 - 3)  ALBUTerol    90 MICROgram(s) HFA Inhaler 2 Puff(s) Inhalation every 6 hours PRN Bronchospasm  ondansetron Injectable 4 milliGRAM(s) IV Push every 6 hours PRN Nausea and/or Vomiting      __________________________________________________  REVIEW OF SYSTEMS:    CONSTITUTIONAL: No fever,   EYES: no acute visual disturbances  NECK: No pain or stiffness  RESPIRATORY: No cough; No shortness of breath  CARDIOVASCULAR: No chest pain, no palpitations  GASTROINTESTINAL: No pain. No nausea or vomiting; No diarrhea   NEUROLOGICAL: No headache or numbness, no tremors  MUSCULOSKELETAL: No joint pain, no muscle pain  GENITOURINARY: no dysuria, no frequency, no hesitancy  PSYCHIATRY: no depression , no anxiety  ALL OTHER  ROS negative        Vital Signs Last 24 Hrs  T(C): 36.4 (14 Mar 2020 08:13), Max: 37 (14 Mar 2020 08:10)  T(F): 97.6 (14 Mar 2020 08:13), Max: 98.6 (14 Mar 2020 08:10)  HR: 71 (14 Mar 2020 08:13) (71 - 80)  BP: 160/58 (14 Mar 2020 08:13) (101/64 - 160/58)  BP(mean): --  RR: 16 (14 Mar 2020 08:13) (16 - 16)  SpO2: 100% (14 Mar 2020 08:13) (100% - 100%)    ________________________________________________  PHYSICAL EXAM:  GENERAL: NAD  HEENT: Normocephalic;  conjunctivae and sclerae clear; moist mucous membranes;   NECK : supple  CHEST/LUNG: Clear to auscultation bilaterally with good air entry   HEART: S1 S2  regular; no murmurs, gallops or rubs  ABDOMEN: Soft, Nontender, Nondistended; Bowel sounds present  EXTREMITIES: no cyanosis; no edema; no calf tenderness  SKIN: warm and dry; no rash  NERVOUS SYSTEM:  Awake and alert; Oriented  to place, person and time ; no new deficits    _________________________________________________  LABS:                        10.5   3.46  )-----------( 219      ( 14 Mar 2020 07:08 )             33.0     03-14    141  |  112<H>  |  3<L>  ----------------------------<  95  4.2   |  22  |  0.59    Ca    7.8<L>      14 Mar 2020 07:08  Phos  3.2     03-13  Mg     2.1     03-13    TPro  6.0  /  Alb  2.6<L>  /  TBili  0.2  /  DBili  x   /  AST  15  /  ALT  18  /  AlkPhos  46  03-13        CAPILLARY BLOOD GLUCOSE            RADIOLOGY & ADDITIONAL TESTS:    Imaging Personally Reviewed:  YES/NO    Consultant(s) Notes Reviewed:   YES/ No    Care Discussed with Consultants :     Plan of care was discussed with patient and /or primary care giver; all questions and concerns were addressed and care was aligned with patient's wishes.

## 2020-03-14 NOTE — PROGRESS NOTE ADULT - ATTENDING COMMENTS
29y old  Female who presents with a chief complaint of Nausea, vomiting, diarrhea     Today  Patient was seen and examined at bedside   Abd pain and diarrhea (once from this morning) has improved     REVIEW OF SYSTEMS: denies fever, chills, SOB, palpitations, chest pain    PHYSICAL EXAM:  GENERAL: in mild distress due to pain   NERVOUS SYSTEM:  Alert & Oriented X3, Good concentration; Motor Strength 5/5 B/L upper and lower extremities  CHEST/LUNG: Clear to auscultation bilaterally; No rales, rhonchi, wheezing, or rubs  HEART: Regular rate and rhythm; No murmurs, rubs, or gallops  ABDOMEN: Soft, mildly tender- more in RLQ, Nondistended; Bowel sounds present  EXTREMITIES:  2+ Peripheral Pulses, No clubbing, cyanosis, or edema  SKIN: No rashes or lesions    Assessment and plan:   1. Pancolitis  2. Iron deficiency anemia   3. Vitamin d deficiency   4. Mild intermittent Asthma   5. Leukopenia     Plan:  S/p Colonoscopy, unclear type of colitis as per GI note  Will obtain CT angio   Clear liquid diet, advance to soft   Will send serological marker for IBD (patient has anti TTG done at her Primary gastroenterologist office which was neg)  IVF   Supplement Iron and Vitamin D   Cont Cipro and Flagyl   Monitor Sugar and electrolytes  Monitor CBC; LDH level

## 2020-03-14 NOTE — PROGRESS NOTE ADULT - PROBLEM SELECTOR PLAN 1
Pt with unclear type of colitis , mild left side and transverse. Has Nuvaring.Would like to see her vasculature even given young age. Would suggest CT angio
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
- p/w nausea, vomiting diarrhea   - Vitals: afeb, HR: 101, BP: 120s/80s, WBC: WNL   - CT abdomen: Pan colitis  - Ed course: 2L NS, Cipro, Flagyl  - Will c/w Cipro and Flagyl   - Stool count, culture, GI PCR negative, C diff ruled out, D/C isolation   - F/u Stool calprotectin,   -Colonoscopy shows colitis (non specific) , Will get CT angio abdomen  - Gi Dr Snow consulted

## 2020-03-14 NOTE — DIETITIAN INITIAL EVALUATION ADULT. - OTHER INFO
Pt visited. Pt  Reports  Diarrhea improving. + Diarrhea 5-6 times per day PTA x ~ 4 days. Pt  w Inadequate Nutrient intake x ~ 4 days. Pt on Clear liquid diet / NPO= 3 days. Diet just advanced to Low Fiber. Food choices Offered ( lactose restricted) , Pt agreed to try Ensure clear. Also Banana Flakes. Pt was Explained On  Dosage and Instruction on Banana Flakes. Also Diet education provided on Low fiber.  lb,  Per Pt Current wt 108 Lb.

## 2020-03-15 ENCOUNTER — TRANSCRIPTION ENCOUNTER (OUTPATIENT)
Age: 30
End: 2020-03-15

## 2020-03-15 VITALS
OXYGEN SATURATION: 100 % | TEMPERATURE: 99 F | SYSTOLIC BLOOD PRESSURE: 97 MMHG | DIASTOLIC BLOOD PRESSURE: 64 MMHG | HEART RATE: 79 BPM | RESPIRATION RATE: 17 BRPM

## 2020-03-15 LAB
ANA TITR SER: NEGATIVE — SIGNIFICANT CHANGE UP
ANION GAP SERPL CALC-SCNC: 7 MMOL/L — SIGNIFICANT CHANGE UP (ref 5–17)
BASOPHILS # BLD AUTO: 0.02 K/UL — SIGNIFICANT CHANGE UP (ref 0–0.2)
BASOPHILS NFR BLD AUTO: 0.4 % — SIGNIFICANT CHANGE UP (ref 0–2)
BUN SERPL-MCNC: 3 MG/DL — LOW (ref 7–18)
CALCIUM SERPL-MCNC: 8.3 MG/DL — LOW (ref 8.4–10.5)
CHLORIDE SERPL-SCNC: 107 MMOL/L — SIGNIFICANT CHANGE UP (ref 96–108)
CO2 SERPL-SCNC: 26 MMOL/L — SIGNIFICANT CHANGE UP (ref 22–31)
CREAT SERPL-MCNC: 0.66 MG/DL — SIGNIFICANT CHANGE UP (ref 0.5–1.3)
CULTURE RESULTS: SIGNIFICANT CHANGE UP
EOSINOPHIL # BLD AUTO: 0.31 K/UL — SIGNIFICANT CHANGE UP (ref 0–0.5)
EOSINOPHIL NFR BLD AUTO: 6.8 % — HIGH (ref 0–6)
GLUCOSE SERPL-MCNC: 96 MG/DL — SIGNIFICANT CHANGE UP (ref 70–99)
HCT VFR BLD CALC: 36.5 % — SIGNIFICANT CHANGE UP (ref 34.5–45)
HGB BLD-MCNC: 11.5 G/DL — SIGNIFICANT CHANGE UP (ref 11.5–15.5)
IMM GRANULOCYTES NFR BLD AUTO: 0.4 % — SIGNIFICANT CHANGE UP (ref 0–1.5)
LYMPHOCYTES # BLD AUTO: 1.71 K/UL — SIGNIFICANT CHANGE UP (ref 1–3.3)
LYMPHOCYTES # BLD AUTO: 37.6 % — SIGNIFICANT CHANGE UP (ref 13–44)
MCHC RBC-ENTMCNC: 25.3 PG — LOW (ref 27–34)
MCHC RBC-ENTMCNC: 31.5 GM/DL — LOW (ref 32–36)
MCV RBC AUTO: 80.4 FL — SIGNIFICANT CHANGE UP (ref 80–100)
MONOCYTES # BLD AUTO: 0.37 K/UL — SIGNIFICANT CHANGE UP (ref 0–0.9)
MONOCYTES NFR BLD AUTO: 8.1 % — SIGNIFICANT CHANGE UP (ref 2–14)
NEUTROPHILS # BLD AUTO: 2.12 K/UL — SIGNIFICANT CHANGE UP (ref 1.8–7.4)
NEUTROPHILS NFR BLD AUTO: 46.7 % — SIGNIFICANT CHANGE UP (ref 43–77)
NRBC # BLD: 0 /100 WBCS — SIGNIFICANT CHANGE UP (ref 0–0)
PLATELET # BLD AUTO: 273 K/UL — SIGNIFICANT CHANGE UP (ref 150–400)
POTASSIUM SERPL-MCNC: 3.8 MMOL/L — SIGNIFICANT CHANGE UP (ref 3.5–5.3)
POTASSIUM SERPL-SCNC: 3.8 MMOL/L — SIGNIFICANT CHANGE UP (ref 3.5–5.3)
RBC # BLD: 4.54 M/UL — SIGNIFICANT CHANGE UP (ref 3.8–5.2)
RBC # FLD: 16.1 % — HIGH (ref 10.3–14.5)
SODIUM SERPL-SCNC: 140 MMOL/L — SIGNIFICANT CHANGE UP (ref 135–145)
SPECIMEN SOURCE: SIGNIFICANT CHANGE UP
WBC # BLD: 4.55 K/UL — SIGNIFICANT CHANGE UP (ref 3.8–10.5)
WBC # FLD AUTO: 4.55 K/UL — SIGNIFICANT CHANGE UP (ref 3.8–10.5)

## 2020-03-15 PROCEDURE — 83550 IRON BINDING TEST: CPT

## 2020-03-15 PROCEDURE — 83615 LACTATE (LD) (LDH) ENZYME: CPT

## 2020-03-15 PROCEDURE — 99285 EMERGENCY DEPT VISIT HI MDM: CPT

## 2020-03-15 PROCEDURE — 82607 VITAMIN B-12: CPT

## 2020-03-15 PROCEDURE — 80048 BASIC METABOLIC PNL TOTAL CA: CPT

## 2020-03-15 PROCEDURE — 87491 CHLMYD TRACH DNA AMP PROBE: CPT

## 2020-03-15 PROCEDURE — 84100 ASSAY OF PHOSPHORUS: CPT

## 2020-03-15 PROCEDURE — 87086 URINE CULTURE/COLONY COUNT: CPT

## 2020-03-15 PROCEDURE — 82746 ASSAY OF FOLIC ACID SERUM: CPT

## 2020-03-15 PROCEDURE — 83993 ASSAY FOR CALPROTECTIN FECAL: CPT

## 2020-03-15 PROCEDURE — 87493 C DIFF AMPLIFIED PROBE: CPT

## 2020-03-15 PROCEDURE — 74174 CTA ABD&PLVS W/CONTRAST: CPT

## 2020-03-15 PROCEDURE — 83735 ASSAY OF MAGNESIUM: CPT

## 2020-03-15 PROCEDURE — 83631 LACTOFERRIN FECAL (QUANT): CPT

## 2020-03-15 PROCEDURE — 85027 COMPLETE CBC AUTOMATED: CPT

## 2020-03-15 PROCEDURE — 83540 ASSAY OF IRON: CPT

## 2020-03-15 PROCEDURE — 88305 TISSUE EXAM BY PATHOLOGIST: CPT

## 2020-03-15 PROCEDURE — 83036 HEMOGLOBIN GLYCOSYLATED A1C: CPT

## 2020-03-15 PROCEDURE — 86038 ANTINUCLEAR ANTIBODIES: CPT

## 2020-03-15 PROCEDURE — 87591 N.GONORRHOEAE DNA AMP PROB: CPT

## 2020-03-15 PROCEDURE — 36415 COLL VENOUS BLD VENIPUNCTURE: CPT

## 2020-03-15 PROCEDURE — 84443 ASSAY THYROID STIM HORMONE: CPT

## 2020-03-15 PROCEDURE — 84466 ASSAY OF TRANSFERRIN: CPT

## 2020-03-15 PROCEDURE — 87040 BLOOD CULTURE FOR BACTERIA: CPT

## 2020-03-15 PROCEDURE — 99239 HOSP IP/OBS DSCHRG MGMT >30: CPT | Mod: GC

## 2020-03-15 PROCEDURE — 86901 BLOOD TYPING SEROLOGIC RH(D): CPT

## 2020-03-15 PROCEDURE — 82306 VITAMIN D 25 HYDROXY: CPT

## 2020-03-15 PROCEDURE — 87389 HIV-1 AG W/HIV-1&-2 AB AG IA: CPT

## 2020-03-15 PROCEDURE — 74177 CT ABD & PELVIS W/CONTRAST: CPT

## 2020-03-15 PROCEDURE — 81001 URINALYSIS AUTO W/SCOPE: CPT

## 2020-03-15 PROCEDURE — 80053 COMPREHEN METABOLIC PANEL: CPT

## 2020-03-15 PROCEDURE — 87507 IADNA-DNA/RNA PROBE TQ 12-25: CPT

## 2020-03-15 PROCEDURE — 87045 FECES CULTURE AEROBIC BACT: CPT

## 2020-03-15 PROCEDURE — 86140 C-REACTIVE PROTEIN: CPT

## 2020-03-15 PROCEDURE — 86900 BLOOD TYPING SEROLOGIC ABO: CPT

## 2020-03-15 PROCEDURE — 86850 RBC ANTIBODY SCREEN: CPT

## 2020-03-15 PROCEDURE — 85652 RBC SED RATE AUTOMATED: CPT

## 2020-03-15 PROCEDURE — 99053 MED SERV 10PM-8AM 24 HR FAC: CPT

## 2020-03-15 PROCEDURE — 87046 STOOL CULTR AEROBIC BACT EA: CPT

## 2020-03-15 PROCEDURE — 84702 CHORIONIC GONADOTROPIN TEST: CPT

## 2020-03-15 PROCEDURE — 86036 ANCA SCREEN EACH ANTIBODY: CPT

## 2020-03-15 PROCEDURE — 80061 LIPID PANEL: CPT

## 2020-03-15 RX ORDER — ERGOCALCIFEROL 1.25 MG/1
1 CAPSULE ORAL
Qty: 4 | Refills: 0
Start: 2020-03-15 | End: 2020-04-13

## 2020-03-15 RX ORDER — MOXIFLOXACIN HYDROCHLORIDE TABLETS, 400 MG 400 MG/1
1 TABLET, FILM COATED ORAL
Qty: 12 | Refills: 0
Start: 2020-03-15 | End: 2020-03-20

## 2020-03-15 RX ORDER — METRONIDAZOLE 500 MG
1 TABLET ORAL
Qty: 18 | Refills: 0
Start: 2020-03-15 | End: 2020-03-20

## 2020-03-15 RX ADMIN — Medication 100 MILLIGRAM(S): at 05:53

## 2020-03-15 RX ADMIN — Medication 100 MILLIGRAM(S): at 05:52

## 2020-03-15 NOTE — DISCHARGE NOTE NURSING/CASE MANAGEMENT/SOCIAL WORK - PATIENT PORTAL LINK FT
You can access the FollowMyHealth Patient Portal offered by [facility] by registering at the following website: [email or web address]. By joining [facility]’s FollowMyHealth portal, you will also be able to view your health information using other applications (apps) compatible with our system.

## 2020-03-15 NOTE — DISCHARGE NOTE PROVIDER - NSDCMRMEDTOKEN_GEN_ALL_CORE_FT
ProAir HFA 90 mcg/inh inhalation aerosol: 2 puff(s) inhaled every 6 hours, As Needed Cipro 500 mg oral tablet: 1 tab(s) orally 2 times a day   ergocalciferol 50,000 intl units (1.25 mg) oral capsule: 1 cap(s) orally once a week  Flagyl 500 mg oral tablet: 1 tab(s) orally 3 times a day   ProAir HFA 90 mcg/inh inhalation aerosol: 2 puff(s) inhaled every 6 hours, As Needed

## 2020-03-15 NOTE — DISCHARGE NOTE PROVIDER - HOSPITAL COURSE
Patient is a 29 year old woman, with PMH of asthma, who presented with severe RLQ pain, constant, non radiating, 8/10, no worsening or relieving factors with associated watery diarrhea, multiple episodes, no blood in BM.         CT abdomen noted to show pancolitis. Started on cipro and flagyl antibiotics. Given 2L NS IVF in ED. GI, Dr. Snow consulted. Colonoscopy done showing unclear type of colitis, mild left side and transverse. Patient tolerating diet.         Patient to complete antibiotic course as outpatient. Patient is medically stable for discharge. Case was discussed with attending. Patient is a 29 year old woman, with PMH of asthma, who presented with severe RLQ pain, constant, non radiating, 8/10, no worsening or relieving factors with associated watery diarrhea, multiple episodes, no blood in BM.         CT abdomen noted to show pancolitis. Started on cipro and flagyl antibiotics. Given 2L NS IVF in ED. GI, Dr. Snow consulted. Colonoscopy done showing unclear type of colitis, mild left side and transverse. Patient tolerating diet.         Patient to complete antibiotic course as outpatient. Patient is medically stable for discharge. Case was discussed with attending.     Agree with plan and recommendations.     Dr Hester

## 2020-03-15 NOTE — DISCHARGE NOTE PROVIDER - NSDCCPCAREPLAN_GEN_ALL_CORE_FT
PRINCIPAL DISCHARGE DIAGNOSIS  Diagnosis: Pancolitis  Assessment and Plan of Treatment: Patient presented with severe RLQ pain, constant, non radiating, 8/10, no worsening or relieving factors with associated watery diarrhea, multiple episodes, no blood in BM.   CT abdomen noted to show pancolitis. Started on cipro and flagyl antibiotics. Given 2L NS IVF in ED. GI, Dr. Snow consulted. Colonoscopy done showing unclear type of colitis, mild left side and transverse. Patient tolerating diet.   Complete antibiotic course as outpatient with cipro 500mg twice a day and flagyl 500mg three times a day for 6 more days. Follow up with your PCP within 1 week. Follow up with gastroenterologist as outpatient for further management.      SECONDARY DISCHARGE DIAGNOSES  Diagnosis: Asthma  Assessment and Plan of Treatment: Continue home medication of albuterol for asthma management. Follow up with your PCP for further management of asthma. PRINCIPAL DISCHARGE DIAGNOSIS  Diagnosis: Pancolitis  Assessment and Plan of Treatment: Patient presented with severe RLQ pain, constant, non radiating, 8/10, no worsening or relieving factors with associated watery diarrhea, multiple episodes, no blood in BM.   CT abdomen noted to show pancolitis. Started on cipro and flagyl antibiotics. Given 2L NS IVF in ED. GI, Dr. Snow consulted. Colonoscopy done showing unclear type of colitis, mild left side and transverse. Patient tolerating diet.   Complete antibiotic course as outpatient with cipro 500mg twice a day and flagyl 500mg three times a day for 6 more days. Follow up with your PCP within 1 week. Follow up with gastroenterologist as outpatient for further management.      SECONDARY DISCHARGE DIAGNOSES  Diagnosis: Vitamin D deficiency  Assessment and Plan of Treatment: Noted to have low vitamin d level on admission. You were started on weekly supplement of vitamin D. Continue taking weekly supplements starting next week.    Diagnosis: Asthma  Assessment and Plan of Treatment: Continue home medication of albuterol for asthma management. Follow up with your PCP for further management of asthma.

## 2020-03-15 NOTE — DISCHARGE NOTE PROVIDER - CARE PROVIDER_API CALL
Irvin Snow)  Gastroenterology  [address] [place] [id number]  Phone: [phone]  Fax: [phone]  Follow Up Time:

## 2020-03-17 DIAGNOSIS — K51.013: ICD-10-CM

## 2020-03-17 LAB
AUTO DIFF PNL BLD: NEGATIVE — SIGNIFICANT CHANGE UP
C-ANCA SER-ACNC: NEGATIVE — SIGNIFICANT CHANGE UP
CULTURE RESULTS: SIGNIFICANT CHANGE UP
CULTURE RESULTS: SIGNIFICANT CHANGE UP
P-ANCA SER-ACNC: NEGATIVE — SIGNIFICANT CHANGE UP
SPECIMEN SOURCE: SIGNIFICANT CHANGE UP
SPECIMEN SOURCE: SIGNIFICANT CHANGE UP
SURGICAL PATHOLOGY STUDY: SIGNIFICANT CHANGE UP

## 2020-03-18 PROBLEM — J45.909 UNSPECIFIED ASTHMA, UNCOMPLICATED: Chronic | Status: ACTIVE | Noted: 2020-03-12

## 2020-03-19 LAB — LACTOFERRIN STL-MCNC: 324.9 — HIGH (ref 0–7.24)

## 2020-03-23 LAB — CALPROTECTIN STL-MCNT: 745 UG/G — HIGH (ref 0–120)

## 2020-03-24 ENCOUNTER — LABORATORY RESULT (OUTPATIENT)
Age: 30
End: 2020-03-24

## 2020-03-24 ENCOUNTER — APPOINTMENT (OUTPATIENT)
Dept: GASTROENTEROLOGY | Facility: CLINIC | Age: 30
End: 2020-03-24
Payer: COMMERCIAL

## 2020-03-24 VITALS
BODY MASS INDEX: 19.63 KG/M2 | OXYGEN SATURATION: 98 % | DIASTOLIC BLOOD PRESSURE: 68 MMHG | WEIGHT: 104 LBS | HEART RATE: 92 BPM | SYSTOLIC BLOOD PRESSURE: 102 MMHG | HEIGHT: 61 IN | TEMPERATURE: 99.2 F

## 2020-03-24 PROCEDURE — 99203 OFFICE O/P NEW LOW 30 MIN: CPT

## 2020-03-24 PROCEDURE — 99213 OFFICE O/P EST LOW 20 MIN: CPT

## 2020-03-24 NOTE — ASSESSMENT
[FreeTextEntry1] : Likely acute on top of chronic UC\par \par PLAN\par Stool\par cont lialda 2 am and 2 pm\par cont rowasa\par get approval for Budesonide \par consider Viberzi next visit

## 2020-03-24 NOTE — HISTORY OF PRESENT ILLNESS
[Heartburn] : denies heartburn [Nausea] : denies nausea [Vomiting] : denies vomiting [Diarrhea] : improved diarrhea [Constipation] : denies constipation [Yellow Skin Or Eyes (Jaundice)] : denies jaundice [Abdominal Pain] : denies abdominal pain [Abdominal Swelling] : denies abdominal swelling [Rectal Pain] : denies rectal pain [_________] : Performed [unfilled] [Good Compliance] : good compliance with treatment [Fair Symptom Control] : fair symptom control [GERD] : no gastroesophageal reflux disease [Hiatus Hernia] : no hiatus hernia [Peptic Ulcer Disease] : no peptic ulcer disease [Pancreatitis] : no pancreatitis [Cholelithiasis] : no cholelithiasis [Kidney Stone] : no kidney stone [Inflammatory Bowel Disease] : no inflammatory bowel disease [Irritable Bowel Syndrome] : no irritable bowel syndrome [Diverticulitis] : no diverticulitis [Alcohol Abuse] : no alcohol abuse [Malignancy] : no malignancy [Abdominal Surgery] : no abdominal surgery [Appendectomy] : no appendectomy [Cholecystectomy] : no cholecystectomy [de-identified] : 30 yo with colitis to transverse colon albeit mild. Changes were acute on top of chronic.On rowasa enama at night.Showing improvement with 5-ASA as well. 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 CT angio was negative.No blood in stool. Patient does have a gallblader. [de-identified] : mild acute on top of chronic colitis most c/w UC [de-identified] : Lialda 4.8 gms /day and Rowasa enema [FreeTextEntry1] : patient with likely acute on top of chronic colitis\par \par PLAN\par stool\par cont lialda 2 pills am and 2 pills Pm\par Cont Rowasa\par Await approval for budesonide\par Consider Viberzi in addition on next visit

## 2020-03-27 LAB
DEPRECATED KAPPA LC FREE/LAMBDA SER: 0.9 RATIO
FERRITIN SERPL-MCNC: 15 NG/ML
FOLATE RBC-MCNC: 1179 NG/ML
GI PCR PANEL, STOOL: NORMAL
HCT VFR BLD CALC: 42.4 %
IGA SER QL IEP: 215 MG/DL
IGG SER QL IEP: 1126 MG/DL
IGM SER QL IEP: 104 MG/DL
IRON SATN MFR SERPL: 42 %
IRON SERPL-MCNC: 204 UG/DL
KAPPA LC CSF-MCNC: 2.7 MG/DL
KAPPA LC SERPL-MCNC: 2.43 MG/DL
TIBC SERPL-MCNC: 486 UG/DL
UIBC SERPL-MCNC: 283 UG/DL
VIT B12 SERPL-MCNC: 613 PG/ML

## 2020-03-30 LAB
ENDOMYSIUM IGA SER QL: NEGATIVE
ENDOMYSIUM IGA TITR SER: NORMAL
PANCREATIC ELASTASE, FECAL: >500
TTG IGA SER IA-ACNC: 1.2 U/ML
TTG IGA SER-ACNC: NEGATIVE
TTG IGG SER IA-ACNC: 5.2 U/ML
TTG IGG SER IA-ACNC: NEGATIVE

## 2020-04-01 LAB — LACTOFERRIN STL-MCNC: 3.4

## 2020-04-06 LAB
MISCELLANEOUS TEST: NORMAL
PROC NAME: NORMAL

## 2020-04-24 DIAGNOSIS — B00.1 HERPESVIRAL VESICULAR DERMATITIS: ICD-10-CM

## 2020-04-30 ENCOUNTER — MESSAGE (OUTPATIENT)
Age: 30
End: 2020-04-30

## 2020-05-04 LAB
SARS-COV-2 IGG SERPL IA-ACNC: 0.1 INDEX
SARS-COV-2 IGG SERPL QL IA: NEGATIVE

## 2020-05-07 ENCOUNTER — APPOINTMENT (OUTPATIENT)
Dept: GASTROENTEROLOGY | Facility: CLINIC | Age: 30
End: 2020-05-07
Payer: COMMERCIAL

## 2020-05-07 VITALS
WEIGHT: 104 LBS | HEART RATE: 69 BPM | SYSTOLIC BLOOD PRESSURE: 99 MMHG | TEMPERATURE: 98.7 F | OXYGEN SATURATION: 99 % | DIASTOLIC BLOOD PRESSURE: 68 MMHG | HEIGHT: 61 IN | BODY MASS INDEX: 19.63 KG/M2

## 2020-05-07 PROCEDURE — 99214 OFFICE O/P EST MOD 30 MIN: CPT

## 2020-05-07 PROCEDURE — 99203 OFFICE O/P NEW LOW 30 MIN: CPT

## 2020-05-28 ENCOUNTER — APPOINTMENT (OUTPATIENT)
Dept: GASTROENTEROLOGY | Facility: CLINIC | Age: 30
End: 2020-05-28
Payer: COMMERCIAL

## 2020-05-28 VITALS
DIASTOLIC BLOOD PRESSURE: 65 MMHG | OXYGEN SATURATION: 100 % | HEIGHT: 61 IN | WEIGHT: 105 LBS | SYSTOLIC BLOOD PRESSURE: 106 MMHG | BODY MASS INDEX: 19.83 KG/M2 | HEART RATE: 82 BPM | TEMPERATURE: 98.2 F

## 2020-05-28 DIAGNOSIS — R19.7 DIARRHEA, UNSPECIFIED: ICD-10-CM

## 2020-05-28 PROCEDURE — 99214 OFFICE O/P EST MOD 30 MIN: CPT

## 2020-05-28 NOTE — HISTORY OF PRESENT ILLNESS
[Nausea] : denies nausea [Heartburn] : denies heartburn [Constipation] : denies constipation [Vomiting] : denies vomiting [Abdominal Swelling] : denies abdominal swelling [Diarrhea] : diarrhea [Yellow Skin Or Eyes (Jaundice)] : denies jaundice [de-identified] : 30 yo with UC to transverse colon that was mild. On budesonide and Lialda as well as rowasa. Has had soft stools 3x day and no real pain or urgency. Some cramps with fried foods.CT angio neg. having 3-4 solid BM's qd. On lialda 4.8 gms  per day and budesonide. On dicyclomine as well.. on iron and vit D/. Has some pain in RLQ. Notices problems and diarrhea withmilk and milk products. Has shown allergy to milk on prior testing. All her biopsies c/w IBD. [Abdominal Pain] : abdominal pain

## 2020-05-28 NOTE — ASSESSMENT
[FreeTextEntry1] : patient with symptoms of lactose intolerance. Has IBD as well\par \par PLAN\par H breath test\par Cont dicyclomine and Iron\par May need lactaid pills\par Will need 90 day supply on all refills\par F/U 3 weeks

## 2020-07-16 RX ORDER — LEVALBUTEROL TARTRATE 45 UG/1
45 AEROSOL, METERED ORAL TWICE DAILY
Qty: 3 | Refills: 3 | Status: DISCONTINUED | COMMUNITY
Start: 2017-03-27 | End: 2020-07-16

## 2020-07-16 RX ORDER — IVERMECTIN 3 MG/1
3 TABLET ORAL
Qty: 6 | Refills: 0 | Status: DISCONTINUED | COMMUNITY
Start: 2019-06-24 | End: 2020-07-16

## 2020-07-16 RX ORDER — EPINEPHRINE 0.3 MG/.3ML
0.3 INJECTION INTRAMUSCULAR
Qty: 1 | Refills: 1 | Status: DISCONTINUED | COMMUNITY
Start: 2018-10-01 | End: 2020-07-16

## 2020-07-16 RX ORDER — PREDNISONE 20 MG/1
20 TABLET ORAL DAILY
Qty: 5 | Refills: 1 | Status: DISCONTINUED | COMMUNITY
Start: 2019-12-20 | End: 2020-07-16

## 2020-07-16 RX ORDER — CLOTRIMAZOLE 10 MG/1
10 LOZENGE ORAL DAILY
Qty: 35 | Refills: 1 | Status: DISCONTINUED | COMMUNITY
Start: 2019-06-10 | End: 2020-07-16

## 2020-10-08 ENCOUNTER — RX RENEWAL (OUTPATIENT)
Age: 30
End: 2020-10-08

## 2020-10-19 ENCOUNTER — APPOINTMENT (OUTPATIENT)
Dept: PULMONOLOGY | Facility: CLINIC | Age: 30
End: 2020-10-19

## 2020-10-20 ENCOUNTER — APPOINTMENT (OUTPATIENT)
Dept: PULMONOLOGY | Facility: CLINIC | Age: 30
End: 2020-10-20
Payer: COMMERCIAL

## 2020-10-20 DIAGNOSIS — J45.909 UNSPECIFIED ASTHMA, UNCOMPLICATED: ICD-10-CM

## 2020-10-20 PROCEDURE — 90686 IIV4 VACC NO PRSV 0.5 ML IM: CPT

## 2020-10-20 PROCEDURE — G0008: CPT

## 2020-10-21 PROBLEM — J45.909 ASTHMA, ALLERGIC: Status: ACTIVE | Noted: 2018-02-22

## 2020-10-23 ENCOUNTER — MED ADMIN CHARGE (OUTPATIENT)
Age: 30
End: 2020-10-23

## 2020-11-20 DIAGNOSIS — R30.0 DYSURIA: ICD-10-CM

## 2020-11-23 RX ORDER — BECLOMETHASONE DIPROPIONATE HFA 80 UG/1
80 AEROSOL, METERED RESPIRATORY (INHALATION) TWICE DAILY
Qty: 1 | Refills: 2 | Status: DISCONTINUED | COMMUNITY
Start: 2019-12-20 | End: 2020-11-23

## 2020-11-23 RX ORDER — CIPROFLOXACIN HYDROCHLORIDE 500 MG/1
500 TABLET, FILM COATED ORAL TWICE DAILY
Qty: 6 | Refills: 1 | Status: DISCONTINUED | COMMUNITY
Start: 2020-11-20 | End: 2020-11-23

## 2020-11-23 RX ORDER — FLUTICASONE PROPIONATE 110 UG/1
110 AEROSOL, METERED RESPIRATORY (INHALATION)
Qty: 1 | Refills: 2 | Status: DISCONTINUED | COMMUNITY
Start: 2019-12-20 | End: 2020-11-23

## 2020-11-24 LAB — BACTERIA UR CULT: ABNORMAL

## 2020-11-25 RX ORDER — CHLORHEXIDINE GLUCONATE 4 %
325 (65 FE) LIQUID (ML) TOPICAL TWICE DAILY
Qty: 60 | Refills: 5 | Status: DISCONTINUED | COMMUNITY
Start: 2020-03-27 | End: 2020-11-25

## 2020-11-25 RX ORDER — BUDESONIDE 9 MG/1
9 TABLET, EXTENDED RELEASE ORAL DAILY
Qty: 30 | Refills: 2 | Status: DISCONTINUED | COMMUNITY
Start: 2020-03-17 | End: 2020-11-25

## 2020-12-11 ENCOUNTER — LABORATORY RESULT (OUTPATIENT)
Age: 30
End: 2020-12-11

## 2020-12-12 ENCOUNTER — EMERGENCY (EMERGENCY)
Facility: HOSPITAL | Age: 30
LOS: 1 days | Discharge: HOME | End: 2020-12-12
Attending: EMERGENCY MEDICINE | Admitting: EMERGENCY MEDICINE
Payer: COMMERCIAL

## 2020-12-12 VITALS
TEMPERATURE: 98 F | SYSTOLIC BLOOD PRESSURE: 132 MMHG | DIASTOLIC BLOOD PRESSURE: 72 MMHG | RESPIRATION RATE: 17 BRPM | OXYGEN SATURATION: 98 % | HEIGHT: 61 IN | WEIGHT: 115.08 LBS | HEART RATE: 79 BPM

## 2020-12-12 VITALS
OXYGEN SATURATION: 99 % | RESPIRATION RATE: 17 BRPM | HEART RATE: 59 BPM | DIASTOLIC BLOOD PRESSURE: 58 MMHG | SYSTOLIC BLOOD PRESSURE: 110 MMHG | TEMPERATURE: 98 F

## 2020-12-12 DIAGNOSIS — M54.9 DORSALGIA, UNSPECIFIED: ICD-10-CM

## 2020-12-12 DIAGNOSIS — Z20.828 CONTACT WITH AND (SUSPECTED) EXPOSURE TO OTHER VIRAL COMMUNICABLE DISEASES: ICD-10-CM

## 2020-12-12 DIAGNOSIS — R10.9 UNSPECIFIED ABDOMINAL PAIN: ICD-10-CM

## 2020-12-12 DIAGNOSIS — R30.0 DYSURIA: ICD-10-CM

## 2020-12-12 LAB
ALBUMIN SERPL ELPH-MCNC: 4.3 G/DL — SIGNIFICANT CHANGE UP (ref 3.3–5)
ALP SERPL-CCNC: 58 U/L — SIGNIFICANT CHANGE UP (ref 40–120)
ALT FLD-CCNC: 25 U/L — SIGNIFICANT CHANGE UP (ref 10–45)
ANION GAP SERPL CALC-SCNC: 13 MMOL/L — SIGNIFICANT CHANGE UP (ref 5–17)
APPEARANCE UR: CLEAR — SIGNIFICANT CHANGE UP
APPEARANCE: ABNORMAL
AST SERPL-CCNC: 25 U/L — SIGNIFICANT CHANGE UP (ref 10–40)
BASOPHILS # BLD AUTO: 0.02 K/UL — SIGNIFICANT CHANGE UP (ref 0–0.2)
BASOPHILS NFR BLD AUTO: 0.4 % — SIGNIFICANT CHANGE UP (ref 0–2)
BILIRUB SERPL-MCNC: 0.3 MG/DL — SIGNIFICANT CHANGE UP (ref 0.2–1.2)
BILIRUB UR-MCNC: NEGATIVE — SIGNIFICANT CHANGE UP
BILIRUBIN URINE: ABNORMAL
BLOOD URINE: ABNORMAL
BUN SERPL-MCNC: 8 MG/DL — SIGNIFICANT CHANGE UP (ref 7–23)
CALCIUM SERPL-MCNC: 9.2 MG/DL — SIGNIFICANT CHANGE UP (ref 8.4–10.5)
CHLORIDE SERPL-SCNC: 101 MMOL/L — SIGNIFICANT CHANGE UP (ref 96–108)
CO2 SERPL-SCNC: 23 MMOL/L — SIGNIFICANT CHANGE UP (ref 22–31)
COLOR SPEC: YELLOW — SIGNIFICANT CHANGE UP
COLOR: ABNORMAL
CREAT SERPL-MCNC: 0.6 MG/DL — SIGNIFICANT CHANGE UP (ref 0.5–1.3)
DIFF PNL FLD: NEGATIVE — SIGNIFICANT CHANGE UP
EOSINOPHIL # BLD AUTO: 0 K/UL — SIGNIFICANT CHANGE UP (ref 0–0.5)
EOSINOPHIL NFR BLD AUTO: 0 % — SIGNIFICANT CHANGE UP (ref 0–6)
GLUCOSE QUALITATIVE U: ABNORMAL
GLUCOSE SERPL-MCNC: 94 MG/DL — SIGNIFICANT CHANGE UP (ref 70–99)
GLUCOSE UR QL: NEGATIVE — SIGNIFICANT CHANGE UP
HCT VFR BLD CALC: 41.5 % — SIGNIFICANT CHANGE UP (ref 34.5–45)
HGB BLD-MCNC: 13 G/DL — SIGNIFICANT CHANGE UP (ref 11.5–15.5)
IMM GRANULOCYTES NFR BLD AUTO: 0.4 % — SIGNIFICANT CHANGE UP (ref 0–1.5)
KETONES UR-MCNC: NEGATIVE — SIGNIFICANT CHANGE UP
KETONES URINE: NEGATIVE
LACTATE SERPL-SCNC: 1.4 MMOL/L — SIGNIFICANT CHANGE UP (ref 0.5–2)
LEUKOCYTE ESTERASE UR-ACNC: NEGATIVE — SIGNIFICANT CHANGE UP
LEUKOCYTE ESTERASE URINE: ABNORMAL
LIDOCAIN IGE QN: 20 U/L — SIGNIFICANT CHANGE UP (ref 7–60)
LYMPHOCYTES # BLD AUTO: 1.12 K/UL — SIGNIFICANT CHANGE UP (ref 1–3.3)
LYMPHOCYTES # BLD AUTO: 20.5 % — SIGNIFICANT CHANGE UP (ref 13–44)
MCHC RBC-ENTMCNC: 26.1 PG — LOW (ref 27–34)
MCHC RBC-ENTMCNC: 31.3 GM/DL — LOW (ref 32–36)
MCV RBC AUTO: 83.2 FL — SIGNIFICANT CHANGE UP (ref 80–100)
MONOCYTES # BLD AUTO: 0.29 K/UL — SIGNIFICANT CHANGE UP (ref 0–0.9)
MONOCYTES NFR BLD AUTO: 5.3 % — SIGNIFICANT CHANGE UP (ref 2–14)
NEUTROPHILS # BLD AUTO: 4.02 K/UL — SIGNIFICANT CHANGE UP (ref 1.8–7.4)
NEUTROPHILS NFR BLD AUTO: 73.4 % — SIGNIFICANT CHANGE UP (ref 43–77)
NITRITE UR-MCNC: NEGATIVE — SIGNIFICANT CHANGE UP
NITRITE URINE: POSITIVE
NRBC # BLD: 0 /100 WBCS — SIGNIFICANT CHANGE UP (ref 0–0)
PH UR: 6 — SIGNIFICANT CHANGE UP (ref 5–8)
PH URINE: 5.5
PLATELET # BLD AUTO: 215 K/UL — SIGNIFICANT CHANGE UP (ref 150–400)
POTASSIUM SERPL-MCNC: 3.9 MMOL/L — SIGNIFICANT CHANGE UP (ref 3.5–5.3)
POTASSIUM SERPL-SCNC: 3.9 MMOL/L — SIGNIFICANT CHANGE UP (ref 3.5–5.3)
PROT SERPL-MCNC: 7.7 G/DL — SIGNIFICANT CHANGE UP (ref 6–8.3)
PROT UR-MCNC: NEGATIVE MG/DL — SIGNIFICANT CHANGE UP
PROTEIN URINE: ABNORMAL
RBC # BLD: 4.99 M/UL — SIGNIFICANT CHANGE UP (ref 3.8–5.2)
RBC # FLD: 15.7 % — HIGH (ref 10.3–14.5)
SARS-COV-2 RNA SPEC QL NAA+PROBE: SIGNIFICANT CHANGE UP
SODIUM SERPL-SCNC: 137 MMOL/L — SIGNIFICANT CHANGE UP (ref 135–145)
SP GR SPEC: 1.02 — SIGNIFICANT CHANGE UP (ref 1–1.03)
SPECIFIC GRAVITY URINE: 1.02
UROBILINOGEN FLD QL: 0.2 E.U./DL — SIGNIFICANT CHANGE UP
UROBILINOGEN URINE: ABNORMAL
WBC # BLD: 5.47 K/UL — SIGNIFICANT CHANGE UP (ref 3.8–10.5)
WBC # FLD AUTO: 5.47 K/UL — SIGNIFICANT CHANGE UP (ref 3.8–10.5)

## 2020-12-12 PROCEDURE — 99284 EMERGENCY DEPT VISIT MOD MDM: CPT | Mod: 25

## 2020-12-12 PROCEDURE — 87040 BLOOD CULTURE FOR BACTERIA: CPT

## 2020-12-12 PROCEDURE — 99285 EMERGENCY DEPT VISIT HI MDM: CPT

## 2020-12-12 PROCEDURE — 96374 THER/PROPH/DIAG INJ IV PUSH: CPT

## 2020-12-12 PROCEDURE — 96361 HYDRATE IV INFUSION ADD-ON: CPT

## 2020-12-12 PROCEDURE — 87635 SARS-COV-2 COVID-19 AMP PRB: CPT

## 2020-12-12 PROCEDURE — 85025 COMPLETE CBC W/AUTO DIFF WBC: CPT

## 2020-12-12 PROCEDURE — 80053 COMPREHEN METABOLIC PANEL: CPT

## 2020-12-12 PROCEDURE — 96375 TX/PRO/DX INJ NEW DRUG ADDON: CPT

## 2020-12-12 PROCEDURE — 81003 URINALYSIS AUTO W/O SCOPE: CPT

## 2020-12-12 PROCEDURE — 74176 CT ABD & PELVIS W/O CONTRAST: CPT | Mod: 26

## 2020-12-12 PROCEDURE — 83690 ASSAY OF LIPASE: CPT

## 2020-12-12 PROCEDURE — 87086 URINE CULTURE/COLONY COUNT: CPT

## 2020-12-12 PROCEDURE — 36415 COLL VENOUS BLD VENIPUNCTURE: CPT

## 2020-12-12 PROCEDURE — 74176 CT ABD & PELVIS W/O CONTRAST: CPT

## 2020-12-12 PROCEDURE — 83605 ASSAY OF LACTIC ACID: CPT

## 2020-12-12 RX ORDER — KETOROLAC TROMETHAMINE 30 MG/ML
15 SYRINGE (ML) INJECTION ONCE
Refills: 0 | Status: DISCONTINUED | OUTPATIENT
Start: 2020-12-12 | End: 2020-12-12

## 2020-12-12 RX ORDER — LIDOCAINE 4 G/100G
1 CREAM TOPICAL
Qty: 7 | Refills: 0
Start: 2020-12-12 | End: 2020-12-18

## 2020-12-12 RX ORDER — MORPHINE SULFATE 50 MG/1
2 CAPSULE, EXTENDED RELEASE ORAL ONCE
Refills: 0 | Status: DISCONTINUED | OUTPATIENT
Start: 2020-12-12 | End: 2020-12-12

## 2020-12-12 RX ORDER — SODIUM CHLORIDE 9 MG/ML
1000 INJECTION INTRAMUSCULAR; INTRAVENOUS; SUBCUTANEOUS ONCE
Refills: 0 | Status: COMPLETED | OUTPATIENT
Start: 2020-12-12 | End: 2020-12-12

## 2020-12-12 RX ORDER — ONDANSETRON 8 MG/1
4 TABLET, FILM COATED ORAL ONCE
Refills: 0 | Status: COMPLETED | OUTPATIENT
Start: 2020-12-12 | End: 2020-12-12

## 2020-12-12 RX ADMIN — ONDANSETRON 4 MILLIGRAM(S): 8 TABLET, FILM COATED ORAL at 11:49

## 2020-12-12 RX ADMIN — SODIUM CHLORIDE 1000 MILLILITER(S): 9 INJECTION INTRAMUSCULAR; INTRAVENOUS; SUBCUTANEOUS at 13:15

## 2020-12-12 RX ADMIN — SODIUM CHLORIDE 1000 MILLILITER(S): 9 INJECTION INTRAMUSCULAR; INTRAVENOUS; SUBCUTANEOUS at 11:49

## 2020-12-12 RX ADMIN — SODIUM CHLORIDE 1000 MILLILITER(S): 9 INJECTION INTRAMUSCULAR; INTRAVENOUS; SUBCUTANEOUS at 12:15

## 2020-12-12 RX ADMIN — SODIUM CHLORIDE 1000 MILLILITER(S): 9 INJECTION INTRAMUSCULAR; INTRAVENOUS; SUBCUTANEOUS at 12:49

## 2020-12-12 RX ADMIN — MORPHINE SULFATE 2 MILLIGRAM(S): 50 CAPSULE, EXTENDED RELEASE ORAL at 11:49

## 2020-12-12 RX ADMIN — Medication 15 MILLIGRAM(S): at 13:11

## 2020-12-12 RX ADMIN — MORPHINE SULFATE 2 MILLIGRAM(S): 50 CAPSULE, EXTENDED RELEASE ORAL at 12:35

## 2020-12-12 NOTE — ED PROVIDER NOTE - OBJECTIVE STATEMENT
The pt is a 29 y/o F, who presents to ED c/o R flank pain x 3 d. States that was dx'd w/UTI on 11/20 - given cipro, then culture results showed that e coli inf was resistant to cipro, hence switched to augmentin, the dysuria improved, but pt states "I just feel crappy", + fatigue, + discomfort w/urination, and now R sided back pain - 8/10, constant, non radiating, has not taken any pain meds. Hx UC and NEHA in past. Denies fevers, chills, n/v/d, cp, sob, cough, dizziness, syncope

## 2020-12-12 NOTE — ED ADULT NURSE NOTE - CHPI ED NUR SYMPTOMS NEG
no blood in stool/no dysuria/no diarrhea/no fever/no chills/no hematuria/no nausea/no abdominal distension/no burning urination/no vomiting

## 2020-12-12 NOTE — ED PROVIDER NOTE - ATTENDING CONTRIBUTION TO CARE
Attending Statement: I have personally performed a face to face diagnostic evaluation on this patient. I have reviewed the ACP note and agree with the history, exam and plan of care, except as noted.     Attending Contribution to Care:  30F who reports h/o IBD and recent UTI, treated with cipro then switched to augmentin due to resistance, who p/w right flank pain and still some dysuria, no f/c, no n/v, no diarrhea, pt well appearing on exam with R CVAT. labs and CT performed with no emergent findings, consider msk pain. No rash to suggest zoster,  pt stable for DC with f/u w/pmd, strict return precautions given, pt understands and agrees w/plan

## 2020-12-12 NOTE — ED ADULT NURSE NOTE - OBJECTIVE STATEMENT
29 yo F pmhx colitis, UTI presents to ED co R flank pain worsening voer the course of the day, pt currently on ABX being tX for a UTI- pt on second ABX for UTI. AOx3, speaking in full sentences. Abdomen nondistended, flank area tender to palpation. Denies fevers, chills, dysuria, hematuria, burning with urination, N/V/D.

## 2020-12-12 NOTE — ED PROVIDER NOTE - CLINICAL SUMMARY MEDICAL DECISION MAKING FREE TEXT BOX
pt c/o uti since 11/20 - was initially on cipro, but then culture results showed ecoli - sensitive to augmentin hence was switched, states that the dysuria is improved but now having r flank pain, non radiating, no associated n/v/d/fevers/chills, on exam + r cva tend, urine neg - culture sent, labs pt c/o uti since 11/20 - was initially on cipro, but then culture results showed ecoli - sensitive to augmentin hence was switched, states that the dysuria is improved but now having r flank pain, non radiating, no associated n/v/d/fevers/chills, on exam + r cva tend, urine neg - culture sent, labs wnl, ct done to eval for hydro/stone -- neg, ? msk pain. will dc w/rx for lidoderm patch , f/u w/pmd, strict return precautions given, pt understands and agrees w/plan pt c/o uti since 11/20 - was initially on cipro, but then culture results showed ecoli - sensitive to augmentin hence was switched, states that the dysuria is improved but now having r flank pain, non radiating, no associated n/v/d/fevers/chills, on exam + r cva tend, urine neg - culture sent, labs wnl, ct done to eval for hydro/stone -- neg, ? msk pain. no rash to suggest zoster. will dc w/rx for lidoderm patch , f/u w/pmd, strict return precautions given, pt understands and agrees w/plan

## 2020-12-12 NOTE — ED PROVIDER NOTE - PATIENT PORTAL LINK FT
You can access the FollowMyHealth Patient Portal offered by [facility] by registering at the following website: [email or web address]. By joining [employer]’s FollowMyHealth portal, you will also be able to view your health information using other applications (apps) compatible with our system.

## 2020-12-12 NOTE — ED ADULT TRIAGE NOTE - CHIEF COMPLAINT QUOTE
c/o burning on urination x 1 month, right flank pain that started 3 days ago.  Currently being treated for UTI.  Patient took cipro x 2 weeks, was told she has e.coli in her urine and is currently taking amoxicillin for it.

## 2020-12-12 NOTE — ED ADULT NURSE NOTE - ED CARDIAC RATE
I have personally evaluated and examined the patient. The Attending was available to me as a supervising provider if needed. normal

## 2020-12-12 NOTE — ED ADULT NURSE NOTE - NSIMPLEMENTINTERV_GEN_ALL_ED
Implemented All Universal Safety Interventions:  [place] to call system. Call bell, personal items and telephone within reach. Instruct patient to call for assistance. Room bathroom lighting operational. Non-slip footwear when patient is off stretcher. Physically safe environment: no spills, clutter or unnecessary equipment. Stretcher in lowest position, wheels locked, appropriate side rails in place.

## 2020-12-13 LAB
BACTERIA UR CULT: NORMAL
CULTURE RESULTS: NO GROWTH — SIGNIFICANT CHANGE UP
SPECIMEN SOURCE: SIGNIFICANT CHANGE UP

## 2020-12-14 ENCOUNTER — APPOINTMENT (OUTPATIENT)
Dept: PULMONOLOGY | Facility: CLINIC | Age: 30
End: 2020-12-14
Payer: COMMERCIAL

## 2020-12-14 VITALS
WEIGHT: 115 LBS | TEMPERATURE: 97.7 F | BODY MASS INDEX: 21.73 KG/M2 | OXYGEN SATURATION: 98 % | SYSTOLIC BLOOD PRESSURE: 112 MMHG | HEART RATE: 76 BPM | DIASTOLIC BLOOD PRESSURE: 68 MMHG

## 2020-12-14 DIAGNOSIS — N39.0 URINARY TRACT INFECTION, SITE NOT SPECIFIED: ICD-10-CM

## 2020-12-14 DIAGNOSIS — R10.9 UNSPECIFIED ABDOMINAL PAIN: ICD-10-CM

## 2020-12-14 PROCEDURE — 36415 COLL VENOUS BLD VENIPUNCTURE: CPT

## 2020-12-14 PROCEDURE — 99072 ADDL SUPL MATRL&STAF TM PHE: CPT

## 2020-12-14 PROCEDURE — 99215 OFFICE O/P EST HI 40 MIN: CPT | Mod: 25

## 2020-12-14 NOTE — DISCUSSION/SUMMARY
[FreeTextEntry1] : She is a 29 y/o woman with h/o asthma and UC. Had UTI 11/20 with E coli resistant to several antibiotics but sens to Amox/clav which she was treated with. Sx got better. Was to have f/u culture. Did not get it. On 12/7 started with UTI sx again. Called me 12/11. Placed on antibiotic again and UA showed blood. On 12/12 developed R flank pain. Seen in ER at [employer] where she works. CT did not show any stones, hydronephrosis or pyelonephritis. No other pathology as well. CT limited due to no oral or IV contrast given. \par \par Appears to have UTI, possible pyelonephritis (although non-contrast CT negative for this). UA, C/S and blood work repeated. UCG was negative in ER. On birth control. U/S requested. \par \par Changed to cefpodoxime pending cultures. May need to see Urology if not better. \par \par Further recommendations to follow the results of the above. \par \par \par \par

## 2020-12-14 NOTE — HISTORY OF PRESENT ILLNESS
[Never] : never [TextBox_4] : Presented with dysuria, pain in anterior lower abdomen and right flank. Recent UTI with E coli treated with amox/clav which improved then recurred. \par \par Was in ER at [facility] 12/12. CT done.

## 2020-12-14 NOTE — REVIEW OF SYSTEMS
[Fever] : fever [Chills] : chills [Abdominal Pain] : abdominal pain [Nausea] : nausea [Dysuria] : dysuria [Frequency] : frequency [Urgency] : urgency [Nasal Congestion] : no nasal congestion [Cough] : no cough [Dyspnea] : no dyspnea [Chest Discomfort] : no chest discomfort [Nasal Discharge] : no nasal discharge [Vomiting] : no vomiting [Diarrhea] : no diarrhea [Constipation] : no constipation [Dysphagia] : no dysphagia [Bleeding] : no bleeding [Irregular Menses] : no irregular menses [Myalgias] : no myalgias [Rash] : no rash [Headache] : no headache [Depression] : no depression [Diabetes] : no diabetes [Thyroid Problem] : no thyroid problem

## 2020-12-14 NOTE — PHYSICAL EXAM
[No Acute Distress] : no acute distress [Normal Oropharynx] : normal oropharynx [No Neck Mass] : no neck mass [Normal S1, S2] : normal s1, s2 [Clear to Auscultation Bilaterally] : clear to auscultation bilaterally [No Abnormalities] : no abnormalities [Soft] : soft [Normal Gait] : normal gait [No Edema] : no edema [No Focal Deficits] : no focal deficits [Oriented x3] : oriented x3 [TextBox_89] : Tender in anterior lower abdomen and has R flank pain

## 2020-12-15 ENCOUNTER — APPOINTMENT (OUTPATIENT)
Dept: ULTRASOUND IMAGING | Facility: CLINIC | Age: 30
End: 2020-12-15
Payer: COMMERCIAL

## 2020-12-15 ENCOUNTER — OUTPATIENT (OUTPATIENT)
Dept: OUTPATIENT SERVICES | Facility: HOSPITAL | Age: 30
LOS: 1 days | End: 2020-12-15
Payer: COMMERCIAL

## 2020-12-15 DIAGNOSIS — Z00.8 ENCOUNTER FOR OTHER GENERAL EXAMINATION: ICD-10-CM

## 2020-12-15 LAB
ALBUMIN SERPL ELPH-MCNC: 4.5 G/DL
ALP BLD-CCNC: 57 U/L
ALT SERPL-CCNC: 23 U/L
ANION GAP SERPL CALC-SCNC: 12 MMOL/L
APPEARANCE: CLEAR
AST SERPL-CCNC: 24 U/L
BASOPHILS # BLD AUTO: 0.01 K/UL
BASOPHILS NFR BLD AUTO: 0.2 %
BILIRUB SERPL-MCNC: 0.2 MG/DL
BILIRUBIN URINE: NEGATIVE
BLOOD URINE: NEGATIVE
BUN SERPL-MCNC: 8 MG/DL
CALCIUM SERPL-MCNC: 9.6 MG/DL
CHLORIDE SERPL-SCNC: 103 MMOL/L
CO2 SERPL-SCNC: 23 MMOL/L
COLOR: NORMAL
CREAT SERPL-MCNC: 0.79 MG/DL
EOSINOPHIL # BLD AUTO: 0 K/UL
EOSINOPHIL NFR BLD AUTO: 0 %
GLUCOSE QUALITATIVE U: NEGATIVE
GLUCOSE SERPL-MCNC: 96 MG/DL
HCG SERPL-MCNC: <1 MIU/ML
HCT VFR BLD CALC: 43.1 %
HGB BLD-MCNC: 13.1 G/DL
IMM GRANULOCYTES NFR BLD AUTO: 0.5 %
KETONES URINE: NEGATIVE
LEUKOCYTE ESTERASE URINE: NEGATIVE
LYMPHOCYTES # BLD AUTO: 1.39 K/UL
LYMPHOCYTES NFR BLD AUTO: 32.1 %
MAN DIFF?: NORMAL
MCHC RBC-ENTMCNC: 26.2 PG
MCHC RBC-ENTMCNC: 30.4 GM/DL
MCV RBC AUTO: 86.2 FL
MONOCYTES # BLD AUTO: 0.29 K/UL
MONOCYTES NFR BLD AUTO: 6.7 %
NEUTROPHILS # BLD AUTO: 2.62 K/UL
NEUTROPHILS NFR BLD AUTO: 60.5 %
NITRITE URINE: NEGATIVE
PH URINE: 6
PLATELET # BLD AUTO: 237 K/UL
POTASSIUM SERPL-SCNC: 4.6 MMOL/L
PROT SERPL-MCNC: 7 G/DL
PROTEIN URINE: NEGATIVE
RBC # BLD: 5 M/UL
RBC # FLD: 16.2 %
SARS-COV-2 IGG SERPL IA-ACNC: 0.06 INDEX
SARS-COV-2 IGG SERPL QL IA: NEGATIVE
SODIUM SERPL-SCNC: 138 MMOL/L
SPECIFIC GRAVITY URINE: 1.01
UROBILINOGEN URINE: NORMAL
WBC # FLD AUTO: 4.33 K/UL

## 2020-12-15 PROCEDURE — 76770 US EXAM ABDO BACK WALL COMP: CPT | Mod: 26

## 2020-12-15 PROCEDURE — 76770 US EXAM ABDO BACK WALL COMP: CPT

## 2020-12-17 LAB
CULTURE RESULTS: SIGNIFICANT CHANGE UP
CULTURE RESULTS: SIGNIFICANT CHANGE UP
SPECIMEN SOURCE: SIGNIFICANT CHANGE UP
SPECIMEN SOURCE: SIGNIFICANT CHANGE UP

## 2020-12-31 PROBLEM — D72.10 EOSINOPHILIA: Status: ACTIVE | Noted: 2019-06-24

## 2021-02-09 ENCOUNTER — APPOINTMENT (OUTPATIENT)
Dept: GASTROENTEROLOGY | Facility: CLINIC | Age: 31
End: 2021-02-09
Payer: COMMERCIAL

## 2021-02-09 VITALS
OXYGEN SATURATION: 98 % | BODY MASS INDEX: 21.34 KG/M2 | TEMPERATURE: 98.1 F | WEIGHT: 113 LBS | DIASTOLIC BLOOD PRESSURE: 72 MMHG | HEART RATE: 82 BPM | SYSTOLIC BLOOD PRESSURE: 108 MMHG | HEIGHT: 61 IN

## 2021-02-09 PROCEDURE — 99213 OFFICE O/P EST LOW 20 MIN: CPT

## 2021-02-09 PROCEDURE — 99072 ADDL SUPL MATRL&STAF TM PHE: CPT

## 2021-02-10 RX ORDER — CEFPODOXIME PROXETIL 200 MG/1
200 TABLET, FILM COATED ORAL
Qty: 28 | Refills: 1 | Status: DISCONTINUED | COMMUNITY
Start: 2020-12-14 | End: 2021-02-10

## 2021-02-10 RX ORDER — AMOXICILLIN AND CLAVULANATE POTASSIUM 500; 125 MG/1; MG/1
500-125 TABLET, FILM COATED ORAL 3 TIMES DAILY
Qty: 21 | Refills: 1 | Status: DISCONTINUED | COMMUNITY
Start: 2020-11-23 | End: 2021-02-10

## 2021-02-10 RX ORDER — FAMCICLOVIR 250 MG/1
250 TABLET, FILM COATED ORAL TWICE DAILY
Qty: 6 | Refills: 1 | Status: DISCONTINUED | COMMUNITY
Start: 2020-04-24 | End: 2021-02-10

## 2021-02-12 NOTE — ED ADULT TRIAGE NOTE - ACCOMPANIED BY
Patient is here for nuclear stress test  Dx: chest pain  Exercised for 9 minutes and 3 seconds. No ECG changes noted. c/o intermittent chest pressure. Please refer to the final stress report for complete findings  Stat read done for nuc scan. Please refer to t
Self

## 2021-02-14 ENCOUNTER — APPOINTMENT (OUTPATIENT)
Dept: DISASTER EMERGENCY | Facility: CLINIC | Age: 31
End: 2021-02-14

## 2021-02-14 NOTE — ASSESSMENT
[FreeTextEntry1] : Patient has ulcerative colitis. Still having 3 bowel movements a day. My plan will be\par \par Colonoscopy and if there's no mucosal healing then start Entyvio

## 2021-02-14 NOTE — HISTORY OF PRESENT ILLNESS
[de-identified] : This patient is 30 years old with history of ulcerative colitis the transverse colon. She was admitted last March. She had dicyclomine and Lialda that were prescribed as well. She is no longer on budesonide. She's having 3 bowel movements a day without bleeding. She is gaining weight. She never went for H. pylori breath test. She is stopped eating dairy and her symptoms improved. She has been vaccinated for covid. She is an ICU nurse

## 2021-02-15 LAB — SARS-COV-2 N GENE NPH QL NAA+PROBE: NOT DETECTED

## 2021-02-17 ENCOUNTER — RESULT REVIEW (OUTPATIENT)
Age: 31
End: 2021-02-17

## 2021-02-17 ENCOUNTER — APPOINTMENT (OUTPATIENT)
Dept: GASTROENTEROLOGY | Facility: HOSPITAL | Age: 31
End: 2021-02-17

## 2021-02-17 ENCOUNTER — OUTPATIENT (OUTPATIENT)
Dept: OUTPATIENT SERVICES | Facility: HOSPITAL | Age: 31
LOS: 1 days | End: 2021-02-17
Payer: COMMERCIAL

## 2021-02-17 DIAGNOSIS — K51.90 ULCERATIVE COLITIS, UNSPECIFIED, WITHOUT COMPLICATIONS: ICD-10-CM

## 2021-02-17 LAB — HCG UR QL: NEGATIVE — SIGNIFICANT CHANGE UP

## 2021-02-17 PROCEDURE — 45380 COLONOSCOPY AND BIOPSY: CPT

## 2021-02-17 PROCEDURE — 81025 URINE PREGNANCY TEST: CPT

## 2021-02-17 PROCEDURE — 88305 TISSUE EXAM BY PATHOLOGIST: CPT | Mod: 26

## 2021-02-17 PROCEDURE — 88305 TISSUE EXAM BY PATHOLOGIST: CPT

## 2021-02-17 NOTE — PROGRESS NOTE ADULT - SUBJECTIVE AND OBJECTIVE BOX
Colonoscopy Report  Indication: UC with soft BM's  Referring:    Instrument:  0232  Anesthesia: MAC  Consent:  Informed consent was obtained from the patient after providing any opportunity for questions  Procedure: After placing the patient in the left lateral decubitus position, the colonoscope was gently inserted into the rectum and advanced to the cecum. Color, texture, mucosa, and anatomy of the colon were carefully examined with the scope. The patient tolerated the procedure well. After completion of the exam, the patient was transferred to the recovery room.     Preparation:  Findings:   Anal Canal	Normal  Rectum	Normal  Sigmoid Colon 	Normal  Descending Colon	Normal            GROSSLY NORMAL MUCOSA THROUGHOUT. BIOPSIES TAKEN THROUGHOUT THE COLON  Splenic Flexure	Normal  Transverse Colon	Normal  Hepatic Flexure	Normal  Ascending Colon	 Normal  Cecum	Normal  Ileo-cecal Valve	Normal  Ileum 	Normal  Date and time:   EBL:0    Impression:  Grossly normal colon    PLAN  F/u biopsies  Continue meds                      Procedure Start Time:  11:25  Cecum Reached Time: 11:30  Procedure End Time:  11:38   Total Withdrawal Time:                                 Attending:

## 2021-02-19 LAB — SURGICAL PATHOLOGY STUDY: SIGNIFICANT CHANGE UP

## 2021-03-11 LAB — GI PCR PANEL, STOOL: NORMAL

## 2021-03-28 LAB
BACTERIA STL CULT: NORMAL
C DIFF TOXIN B QL PCR REFLEX: NORMAL
DEPRECATED O AND P PREP STL: ABNORMAL
G LAMBLIA AG STL QL: NORMAL
GDH ANTIGEN: NOT DETECTED
LACTOFERRIN STL-MCNC: <1
PANCREATIC ELASTASE, FECAL: >500
TOXIN A AND B: NOT DETECTED

## 2021-05-14 ENCOUNTER — EMERGENCY (EMERGENCY)
Facility: HOSPITAL | Age: 31
LOS: 1 days | Discharge: HOME | End: 2021-05-14
Attending: PERSONAL EMERGENCY RESPONSE ATTENDANT
Payer: COMMERCIAL

## 2021-05-14 VITALS
WEIGHT: 110.01 LBS | HEART RATE: 94 BPM | HEIGHT: 61 IN | RESPIRATION RATE: 18 BRPM | TEMPERATURE: 98 F | SYSTOLIC BLOOD PRESSURE: 133 MMHG | DIASTOLIC BLOOD PRESSURE: 84 MMHG | OXYGEN SATURATION: 98 %

## 2021-05-14 VITALS
RESPIRATION RATE: 18 BRPM | HEART RATE: 71 BPM | OXYGEN SATURATION: 98 % | SYSTOLIC BLOOD PRESSURE: 99 MMHG | DIASTOLIC BLOOD PRESSURE: 63 MMHG

## 2021-05-14 LAB
ALBUMIN SERPL ELPH-MCNC: 4.4 G/DL — SIGNIFICANT CHANGE UP (ref 3.3–5)
ALP SERPL-CCNC: 60 U/L — SIGNIFICANT CHANGE UP (ref 40–120)
ALT FLD-CCNC: 25 U/L — SIGNIFICANT CHANGE UP (ref 10–45)
ANION GAP SERPL CALC-SCNC: 13 MMOL/L — SIGNIFICANT CHANGE UP (ref 5–17)
APPEARANCE UR: CLEAR — SIGNIFICANT CHANGE UP
AST SERPL-CCNC: 28 U/L — SIGNIFICANT CHANGE UP (ref 10–40)
BACTERIA # UR AUTO: NEGATIVE — SIGNIFICANT CHANGE UP
BASE EXCESS BLDV CALC-SCNC: -1.4 MMOL/L — SIGNIFICANT CHANGE UP (ref -2–2)
BASOPHILS # BLD AUTO: 0.01 K/UL — SIGNIFICANT CHANGE UP (ref 0–0.2)
BASOPHILS NFR BLD AUTO: 0.2 % — SIGNIFICANT CHANGE UP (ref 0–2)
BILIRUB SERPL-MCNC: 0.3 MG/DL — SIGNIFICANT CHANGE UP (ref 0.2–1.2)
BILIRUB UR-MCNC: NEGATIVE — SIGNIFICANT CHANGE UP
BUN SERPL-MCNC: 8 MG/DL — SIGNIFICANT CHANGE UP (ref 7–23)
CA-I SERPL-SCNC: 1.22 MMOL/L — SIGNIFICANT CHANGE UP (ref 1.12–1.3)
CALCIUM SERPL-MCNC: 8.9 MG/DL — SIGNIFICANT CHANGE UP (ref 8.4–10.5)
CHLORIDE BLDV-SCNC: 107 MMOL/L — SIGNIFICANT CHANGE UP (ref 96–108)
CHLORIDE SERPL-SCNC: 104 MMOL/L — SIGNIFICANT CHANGE UP (ref 96–108)
CO2 BLDV-SCNC: 26 MMOL/L — SIGNIFICANT CHANGE UP (ref 22–30)
CO2 SERPL-SCNC: 21 MMOL/L — LOW (ref 22–31)
COLOR SPEC: COLORLESS — SIGNIFICANT CHANGE UP
CREAT SERPL-MCNC: 0.64 MG/DL — SIGNIFICANT CHANGE UP (ref 0.5–1.3)
DIFF PNL FLD: NEGATIVE — SIGNIFICANT CHANGE UP
EOSINOPHIL # BLD AUTO: 0 K/UL — SIGNIFICANT CHANGE UP (ref 0–0.5)
EOSINOPHIL NFR BLD AUTO: 0 % — SIGNIFICANT CHANGE UP (ref 0–6)
EPI CELLS # UR: 1 /HPF — SIGNIFICANT CHANGE UP
GAS PNL BLDV: 134 MMOL/L — LOW (ref 135–145)
GAS PNL BLDV: SIGNIFICANT CHANGE UP
GLUCOSE BLDV-MCNC: 114 MG/DL — HIGH (ref 70–99)
GLUCOSE SERPL-MCNC: 111 MG/DL — HIGH (ref 70–99)
GLUCOSE UR QL: NEGATIVE — SIGNIFICANT CHANGE UP
HCG SERPL-ACNC: <2 MIU/ML — SIGNIFICANT CHANGE UP
HCO3 BLDV-SCNC: 25 MMOL/L — SIGNIFICANT CHANGE UP (ref 21–29)
HCT VFR BLD CALC: 42.7 % — SIGNIFICANT CHANGE UP (ref 34.5–45)
HCT VFR BLDA CALC: 45 % — SIGNIFICANT CHANGE UP (ref 39–50)
HGB BLD CALC-MCNC: 14.6 G/DL — SIGNIFICANT CHANGE UP (ref 11.5–15.5)
HGB BLD-MCNC: 13.3 G/DL — SIGNIFICANT CHANGE UP (ref 11.5–15.5)
HYALINE CASTS # UR AUTO: 1 /LPF — SIGNIFICANT CHANGE UP (ref 0–2)
IMM GRANULOCYTES NFR BLD AUTO: 0.2 % — SIGNIFICANT CHANGE UP (ref 0–1.5)
KETONES UR-MCNC: NEGATIVE — SIGNIFICANT CHANGE UP
LACTATE BLDV-MCNC: 1.9 MMOL/L — SIGNIFICANT CHANGE UP (ref 0.7–2)
LEUKOCYTE ESTERASE UR-ACNC: NEGATIVE — SIGNIFICANT CHANGE UP
LIDOCAIN IGE QN: 25 U/L — SIGNIFICANT CHANGE UP (ref 7–60)
LYMPHOCYTES # BLD AUTO: 1.55 K/UL — SIGNIFICANT CHANGE UP (ref 1–3.3)
LYMPHOCYTES # BLD AUTO: 35 % — SIGNIFICANT CHANGE UP (ref 13–44)
MCHC RBC-ENTMCNC: 26.7 PG — LOW (ref 27–34)
MCHC RBC-ENTMCNC: 31.1 GM/DL — LOW (ref 32–36)
MCV RBC AUTO: 85.7 FL — SIGNIFICANT CHANGE UP (ref 80–100)
MONOCYTES # BLD AUTO: 0.3 K/UL — SIGNIFICANT CHANGE UP (ref 0–0.9)
MONOCYTES NFR BLD AUTO: 6.8 % — SIGNIFICANT CHANGE UP (ref 2–14)
NEUTROPHILS # BLD AUTO: 2.56 K/UL — SIGNIFICANT CHANGE UP (ref 1.8–7.4)
NEUTROPHILS NFR BLD AUTO: 57.8 % — SIGNIFICANT CHANGE UP (ref 43–77)
NITRITE UR-MCNC: NEGATIVE — SIGNIFICANT CHANGE UP
NRBC # BLD: 0 /100 WBCS — SIGNIFICANT CHANGE UP (ref 0–0)
OTHER CELLS CSF MANUAL: 16 ML/DL — LOW (ref 18–22)
PCO2 BLDV: 50 MMHG — SIGNIFICANT CHANGE UP (ref 35–50)
PH BLDV: 7.32 — LOW (ref 7.35–7.45)
PH UR: 6.5 — SIGNIFICANT CHANGE UP (ref 5–8)
PLATELET # BLD AUTO: 228 K/UL — SIGNIFICANT CHANGE UP (ref 150–400)
PO2 BLDV: 48 MMHG — HIGH (ref 25–45)
POTASSIUM BLDV-SCNC: 3.6 MMOL/L — SIGNIFICANT CHANGE UP (ref 3.5–5.3)
POTASSIUM SERPL-MCNC: 4 MMOL/L — SIGNIFICANT CHANGE UP (ref 3.5–5.3)
POTASSIUM SERPL-SCNC: 4 MMOL/L — SIGNIFICANT CHANGE UP (ref 3.5–5.3)
PROT SERPL-MCNC: 7.4 G/DL — SIGNIFICANT CHANGE UP (ref 6–8.3)
PROT UR-MCNC: NEGATIVE — SIGNIFICANT CHANGE UP
RBC # BLD: 4.98 M/UL — SIGNIFICANT CHANGE UP (ref 3.8–5.2)
RBC # FLD: 14.9 % — HIGH (ref 10.3–14.5)
RBC CASTS # UR COMP ASSIST: 1 /HPF — SIGNIFICANT CHANGE UP (ref 0–4)
SAO2 % BLDV: 79 % — SIGNIFICANT CHANGE UP (ref 67–88)
SODIUM SERPL-SCNC: 138 MMOL/L — SIGNIFICANT CHANGE UP (ref 135–145)
SP GR SPEC: 1.01 — LOW (ref 1.01–1.02)
UROBILINOGEN FLD QL: NEGATIVE — SIGNIFICANT CHANGE UP
WBC # BLD: 4.43 K/UL — SIGNIFICANT CHANGE UP (ref 3.8–10.5)
WBC # FLD AUTO: 4.43 K/UL — SIGNIFICANT CHANGE UP (ref 3.8–10.5)
WBC UR QL: 0 /HPF — SIGNIFICANT CHANGE UP (ref 0–5)

## 2021-05-14 PROCEDURE — 85025 COMPLETE CBC W/AUTO DIFF WBC: CPT

## 2021-05-14 PROCEDURE — 74177 CT ABD & PELVIS W/CONTRAST: CPT

## 2021-05-14 PROCEDURE — 81001 URINALYSIS AUTO W/SCOPE: CPT

## 2021-05-14 PROCEDURE — 85018 HEMOGLOBIN: CPT

## 2021-05-14 PROCEDURE — 85014 HEMATOCRIT: CPT

## 2021-05-14 PROCEDURE — 82803 BLOOD GASES ANY COMBINATION: CPT

## 2021-05-14 PROCEDURE — 99285 EMERGENCY DEPT VISIT HI MDM: CPT

## 2021-05-14 PROCEDURE — G1004: CPT

## 2021-05-14 PROCEDURE — 82947 ASSAY GLUCOSE BLOOD QUANT: CPT

## 2021-05-14 PROCEDURE — 84295 ASSAY OF SERUM SODIUM: CPT

## 2021-05-14 PROCEDURE — 87086 URINE CULTURE/COLONY COUNT: CPT

## 2021-05-14 PROCEDURE — 99285 EMERGENCY DEPT VISIT HI MDM: CPT | Mod: 25

## 2021-05-14 PROCEDURE — 83690 ASSAY OF LIPASE: CPT

## 2021-05-14 PROCEDURE — 74177 CT ABD & PELVIS W/CONTRAST: CPT | Mod: 26,ME

## 2021-05-14 PROCEDURE — 84132 ASSAY OF SERUM POTASSIUM: CPT

## 2021-05-14 PROCEDURE — 83605 ASSAY OF LACTIC ACID: CPT

## 2021-05-14 PROCEDURE — 80053 COMPREHEN METABOLIC PANEL: CPT

## 2021-05-14 PROCEDURE — 82435 ASSAY OF BLOOD CHLORIDE: CPT

## 2021-05-14 PROCEDURE — 82330 ASSAY OF CALCIUM: CPT

## 2021-05-14 PROCEDURE — 84702 CHORIONIC GONADOTROPIN TEST: CPT

## 2021-05-14 PROCEDURE — 96375 TX/PRO/DX INJ NEW DRUG ADDON: CPT

## 2021-05-14 PROCEDURE — 96374 THER/PROPH/DIAG INJ IV PUSH: CPT | Mod: XU

## 2021-05-14 RX ORDER — MORPHINE SULFATE 50 MG/1
2 CAPSULE, EXTENDED RELEASE ORAL ONCE
Refills: 0 | Status: DISCONTINUED | OUTPATIENT
Start: 2021-05-14 | End: 2021-05-14

## 2021-05-14 RX ORDER — ONDANSETRON 8 MG/1
4 TABLET, FILM COATED ORAL ONCE
Refills: 0 | Status: COMPLETED | OUTPATIENT
Start: 2021-05-14 | End: 2021-05-14

## 2021-05-14 RX ORDER — OXYCODONE HYDROCHLORIDE 5 MG/1
1 TABLET ORAL
Qty: 12 | Refills: 0
Start: 2021-05-14 | End: 2021-05-17

## 2021-05-14 RX ADMIN — ONDANSETRON 4 MILLIGRAM(S): 8 TABLET, FILM COATED ORAL at 13:13

## 2021-05-14 RX ADMIN — MORPHINE SULFATE 2 MILLIGRAM(S): 50 CAPSULE, EXTENDED RELEASE ORAL at 13:13

## 2021-05-14 NOTE — ED PROVIDER NOTE - ATTENDING CONTRIBUTION TO CARE
Attending MD Uriarte.  Agree with above.  Pt is a 31 yo female with pmhx of UC on mesalamine, no steroids x 1 yr without other medical problems.  Pt presents to ED currently with complaint of abdominal pain worse on RLQ x 5 days assoc with nausea without vomiting.  + Diarrhea that began yesterday with mucus without blood.  Pt had an episode of pancolitis ~1 yr ago which felt similar to today's presentation.  Denies assoc fever/chills/cough/SOB.  No dysuria/vaginal discharge/bleeding.  No hx of abdominal surgeries/abscesses/fistulas/strictures/obstructions.  Pt is alert and oriented x 4, well appearing.  Abdomen vaguely diffusely tender with focal TTP to RLQ/R abdomen and epigastrum.  No distention.  Planned CTAP for concern for UC flair.  Pt gets her usual GI care from Dr. Snow through [facility]. Attending MD Uriarte.  Agree with above.  Pt is a 31 yo female with pmhx of UC on mesalamine, no steroids x 1 yr without other medical problems.  Pt presents to ED currently with complaint of abdominal pain worse on RLQ x 5 days assoc with nausea without vomiting.  + Diarrhea that began yesterday with mucus without blood.  Pt had an episode of pancolitis ~1 yr ago which felt similar to today's presentation.  Denies assoc fever/chills/cough/SOB.  No dysuria/vaginal discharge/bleeding.  No hx of abdominal surgeries/abscesses/fistulas/strictures/obstructions.  Pt is alert and oriented x 4, well appearing.  Abdomen vaguely diffusely tender with focal TTP to RLQ/R abdomen and epigastrum.  No distention.  Planned CTAP for concern for UC flair.  Pt gets her usual GI care from Dr. Snow through [facility].  CTAP non-actionable except for small amount of intravesicular air.  Plan to discuss poss fistula with pt's GI doctor and likely d/c.  Hemodynamically stable at time of signout to incoming team pending communication with pt's GI.

## 2021-05-14 NOTE — ED PROVIDER NOTE - PHYSICAL EXAMINATION
General: well appearing, no acute distress, AOx3  Skin: normal color for race, no rash  Head: normocephalic, atraumatic  Eyes: clear conjunctiva, EOMI  ENMT: airway patent, no nasal discharge  Cardiovascular: normal rate, normal rhythm, S1/S2  Pulmonary: clear to auscultation bilaterally, no rales, rhonchi, or wheeze  Abdomen: soft, RUQ, RLQ tenderness, non peritoneal, no guarding   Musculoskeletal: moving extremities well, no deformity  Psych: normal mood, normal affect

## 2021-05-14 NOTE — ED PROVIDER NOTE - PATIENT PORTAL LINK FT
You can access the FollowMyHealth Patient Portal offered by [facility] by registering at the following website: [email or web address]. By joining [employer]’s FollowMyHealth portal, you will also be able to view your health information using other applications (apps) compatible with our system.

## 2021-05-14 NOTE — ED PROVIDER NOTE - PROGRESS NOTE DETAILS
Avery Reilly, PGY-1- Patient reevaluated multiple times. Labs and CT results reviewed. Pt with small amount of air in bladder wall, which may represent infection vs fistula per report. Pt denies any urethral manipulation, but notes a UTI last month that was initially treated with Cipro and then Doxycycline due to resistance. Pt without UTI on today's UA thus unlikely to be a fistula. Pt offered stool sample including c diff, culture, and ova/parasite testing, however, she was unable to go in the ED. Prefers to go home and f/u with GI. Ambulatory in ED. Pt to be given Oxycodone to take as needed for severe pain. Discussed results of work up with patient. Patient agrees with plan to discharge home. All questions answered regarding plan. Strict return precautions given.

## 2021-05-14 NOTE — ED PROVIDER NOTE - NSFOLLOWUPINSTRUCTIONS_ED_ALL_ED_FT
1) Follow up with your GI doctor within 5 days of being seen in the Emergency Department   2) Return to the ED immediately for new or worsening symptoms severe pain, vomiting, unable to eat or drink, blood in stool, dizziness, lightheadedness   3) Please continue to take any home medications as prescribed  4) Your test results from your ED visit were discussed with you prior to discharge  5) You were provided with a copy of your test results  6) Oxycodone was sent to your pharmacy. Please use only for severe pain. It is addictive and should only be used if necessary. 1) Follow up with your GI doctor within 5 days of being seen in the Emergency Department   2) Return to the ED immediately for new or worsening symptoms severe pain, vomiting, unable to eat or drink, blood in stool, dizziness, lightheadedness   3) Please continue to take any home medications as prescribed  4) Your test results from your ED visit were discussed with you prior to discharge  5) You were provided with a copy of your test results  6) Oxycodone was sent to your pharmacy. Please use only for severe pain. It is addictive and should only be used if necessary. Please do not work/drive while taking this medication. Follow pharmacy packaging.

## 2021-05-14 NOTE — ED PROVIDER NOTE - OBJECTIVE STATEMENT
30 year old female with a pmhx of Ulcerative Colitis on Mesalamine, presents to ED for evaluation of diffuse abdominal pain x5 days. Pt reports pain mostly on right side of abdomen and feels similar to when she had pancolitis requiring admission. She notes nausea and diarrhea that started yesterday. Diarrhea is mucousy without visible blood. Pt reports no fever, chills, vomiting, cough, sob, cp, dysuria, or vaginal bleeding. She reports previously being on Prednisone but hasn't been on any recently. Pt has no hx of abdominal surgeries. NKA. Has nuva ring.   GI- Dr. Gwendolyn Llanos

## 2021-05-14 NOTE — ED ADULT NURSE NOTE - OBJECTIVE STATEMENT
Patient presents to Ed with c/o abd pain. Patient reports hx of Ulcerative Colitis and has been experiencing  abd pain nausea and diarrhea since Sunday. Patient denies any blood in diarrhea, denies chest pain or sob  reports nausea no vomiting fever chills cough headache or dizziness denies any urinary symptoms. RAC 20g  iv lock placed, labs drawn and sent.

## 2021-05-14 NOTE — ED PROVIDER NOTE - NS ED ROS FT
General: no fever, no chills  Eyes: no vision changes, no eye pain  Cardiovascular: no chest pain, no edema  Respiratory: no cough, no shortness of breath  Gastrointestinal: +abdominal pain, nausea, diarrhea. no vomiting  Genitourinary: no dysuria, no hematuria  Musculoskeletal: no muscle pain, no joint pain  Skin: no rash, no lesions  Neuro: no numbness, no tingling  Psych: no depression, no anxiety

## 2021-05-14 NOTE — ED PROVIDER NOTE - CLINICAL SUMMARY MEDICAL DECISION MAKING FREE TEXT BOX
30 year old female with a pmhx of UC, presenting with abd pain, diarrhea, nausea. Feels like prior pancolitis. Will obtain CT abd/pelv to eval for abscess, labs, hcg, reeval

## 2021-05-15 LAB
CULTURE RESULTS: SIGNIFICANT CHANGE UP
SPECIMEN SOURCE: SIGNIFICANT CHANGE UP

## 2021-07-01 ENCOUNTER — APPOINTMENT (OUTPATIENT)
Dept: PULMONOLOGY | Facility: CLINIC | Age: 31
End: 2021-07-01
Payer: COMMERCIAL

## 2021-07-01 DIAGNOSIS — Z23 ENCOUNTER FOR IMMUNIZATION: ICD-10-CM

## 2021-07-01 PROBLEM — K51.919 ULCERATIVE COLITIS, UNSPECIFIED WITH UNSPECIFIED COMPLICATIONS: Chronic | Status: ACTIVE | Noted: 2021-05-14

## 2021-07-01 PROCEDURE — 86580 TB INTRADERMAL TEST: CPT

## 2021-07-06 ENCOUNTER — MED ADMIN CHARGE (OUTPATIENT)
Age: 31
End: 2021-07-06

## 2021-07-29 ENCOUNTER — APPOINTMENT (OUTPATIENT)
Dept: PULMONOLOGY | Facility: CLINIC | Age: 31
End: 2021-07-29
Payer: COMMERCIAL

## 2021-07-29 PROCEDURE — 90471 IMMUNIZATION ADMIN: CPT

## 2021-07-29 PROCEDURE — 90734 MENACWYD/MENACWYCRM VACC IM: CPT

## 2021-08-05 ENCOUNTER — MED ADMIN CHARGE (OUTPATIENT)
Age: 31
End: 2021-08-05

## 2021-08-20 ENCOUNTER — APPOINTMENT (OUTPATIENT)
Dept: PULMONOLOGY | Facility: CLINIC | Age: 31
End: 2021-08-20
Payer: COMMERCIAL

## 2021-08-20 DIAGNOSIS — Z23 ENCOUNTER FOR IMMUNIZATION: ICD-10-CM

## 2021-08-20 PROCEDURE — 90714 TD VACC NO PRESV 7 YRS+ IM: CPT

## 2021-08-20 PROCEDURE — 90471 IMMUNIZATION ADMIN: CPT

## 2021-08-20 RX ORDER — POLYETHYLENE GLYCOL 3350, SODIUM SULFATE, SODIUM CHLORIDE, POTASSIUM CHLORIDE, ASCORBIC ACID, SODIUM ASCORBATE 140-9-5.2G
140 KIT ORAL
Qty: 1 | Refills: 0 | Status: DISCONTINUED | COMMUNITY
Start: 2021-02-09 | End: 2021-08-20

## 2021-08-20 RX ORDER — ALBUTEROL SULFATE 90 UG/1
108 (90 BASE) INHALANT RESPIRATORY (INHALATION)
Qty: 3 | Refills: 1 | Status: ACTIVE | COMMUNITY
Start: 2018-12-20 | End: 1900-01-01

## 2021-08-20 RX ORDER — CHOLECALCIFEROL (VITAMIN D3) 1250 MCG
1.25 MG CAPSULE ORAL
Qty: 4 | Refills: 2 | Status: DISCONTINUED | COMMUNITY
Start: 2019-06-14 | End: 2021-08-20

## 2021-08-20 RX ORDER — LEVALBUTEROL HYDROCHLORIDE 1.25 MG/3ML
1.25 SOLUTION RESPIRATORY (INHALATION)
Qty: 360 | Refills: 1 | Status: DISCONTINUED | COMMUNITY
Start: 2017-03-27 | End: 2021-08-20

## 2021-08-20 RX ORDER — MESALAMINE 4 G/60 ML
4 KIT RECTAL
Qty: 12 | Refills: 3 | Status: DISCONTINUED | COMMUNITY
Start: 2020-03-17 | End: 2021-08-20

## 2021-08-24 PROBLEM — Z23 ENCOUNTER FOR IMMUNIZATION: Status: ACTIVE | Noted: 2021-07-01

## 2021-09-30 ENCOUNTER — MED ADMIN CHARGE (OUTPATIENT)
Age: 31
End: 2021-09-30

## 2022-02-01 NOTE — DIETITIAN INITIAL EVALUATION ADULT. - PROBLEM SELECTOR PLAN 3
The ECG revealed sinus bradycardia.  IMPROVE VTE Individual Risk Assessment  RISK                                                                Points  [  ] Previous VTE                                                  3  [  ] Thrombophilia                                               2  [  ] Lower limb paralysis                                      2        (unable to hold up >15 seconds)    [  ] Current Cancer                                              2         (within 6 months)  [  ] Immobilization > 24 hrs                                1  [  ] ICU/CCU stay > 24 hours                              1  [  ] Age > 60                                                      1  IMPROVE VTE Score _0, DVT proph not recommended

## 2022-05-19 VITALS
DIASTOLIC BLOOD PRESSURE: 80 MMHG | HEIGHT: 61 IN | SYSTOLIC BLOOD PRESSURE: 127 MMHG | OXYGEN SATURATION: 98 % | HEART RATE: 92 BPM | RESPIRATION RATE: 16 BRPM | WEIGHT: 115.08 LBS | TEMPERATURE: 99 F

## 2022-05-19 LAB
ALBUMIN SERPL ELPH-MCNC: 4.1 G/DL — SIGNIFICANT CHANGE UP (ref 3.3–5)
ALP SERPL-CCNC: 70 U/L — SIGNIFICANT CHANGE UP (ref 40–120)
ALT FLD-CCNC: 19 U/L — SIGNIFICANT CHANGE UP (ref 10–45)
ANION GAP SERPL CALC-SCNC: 11 MMOL/L — SIGNIFICANT CHANGE UP (ref 5–17)
APPEARANCE UR: CLEAR — SIGNIFICANT CHANGE UP
AST SERPL-CCNC: 24 U/L — SIGNIFICANT CHANGE UP (ref 10–40)
BASOPHILS # BLD AUTO: 0.01 K/UL — SIGNIFICANT CHANGE UP (ref 0–0.2)
BASOPHILS NFR BLD AUTO: 0.2 % — SIGNIFICANT CHANGE UP (ref 0–2)
BILIRUB SERPL-MCNC: 0.5 MG/DL — SIGNIFICANT CHANGE UP (ref 0.2–1.2)
BILIRUB UR-MCNC: NEGATIVE — SIGNIFICANT CHANGE UP
BUN SERPL-MCNC: 11 MG/DL — SIGNIFICANT CHANGE UP (ref 7–23)
CALCIUM SERPL-MCNC: 8.6 MG/DL — SIGNIFICANT CHANGE UP (ref 8.4–10.5)
CHLORIDE SERPL-SCNC: 102 MMOL/L — SIGNIFICANT CHANGE UP (ref 96–108)
CO2 SERPL-SCNC: 22 MMOL/L — SIGNIFICANT CHANGE UP (ref 22–31)
COLOR SPEC: YELLOW — SIGNIFICANT CHANGE UP
CREAT SERPL-MCNC: 0.64 MG/DL — SIGNIFICANT CHANGE UP (ref 0.5–1.3)
DIFF PNL FLD: NEGATIVE — SIGNIFICANT CHANGE UP
EGFR: 120 ML/MIN/1.73M2 — SIGNIFICANT CHANGE UP
EOSINOPHIL # BLD AUTO: 0.01 K/UL — SIGNIFICANT CHANGE UP (ref 0–0.5)
EOSINOPHIL NFR BLD AUTO: 0.2 % — SIGNIFICANT CHANGE UP (ref 0–6)
GLUCOSE SERPL-MCNC: 106 MG/DL — HIGH (ref 70–99)
GLUCOSE UR QL: NEGATIVE — SIGNIFICANT CHANGE UP
HCG SERPL-ACNC: <0 MIU/ML — SIGNIFICANT CHANGE UP
HCT VFR BLD CALC: 43.3 % — SIGNIFICANT CHANGE UP (ref 34.5–45)
HGB BLD-MCNC: 14.6 G/DL — SIGNIFICANT CHANGE UP (ref 11.5–15.5)
IMM GRANULOCYTES NFR BLD AUTO: 0.2 % — SIGNIFICANT CHANGE UP (ref 0–1.5)
KETONES UR-MCNC: ABNORMAL MG/DL
LACTATE SERPL-SCNC: 1.3 MMOL/L — SIGNIFICANT CHANGE UP (ref 0.5–2)
LEUKOCYTE ESTERASE UR-ACNC: NEGATIVE — SIGNIFICANT CHANGE UP
LIDOCAIN IGE QN: 17 U/L — SIGNIFICANT CHANGE UP (ref 7–60)
LYMPHOCYTES # BLD AUTO: 1.14 K/UL — SIGNIFICANT CHANGE UP (ref 1–3.3)
LYMPHOCYTES # BLD AUTO: 27.5 % — SIGNIFICANT CHANGE UP (ref 13–44)
MCHC RBC-ENTMCNC: 29.2 PG — SIGNIFICANT CHANGE UP (ref 27–34)
MCHC RBC-ENTMCNC: 33.7 GM/DL — SIGNIFICANT CHANGE UP (ref 32–36)
MCV RBC AUTO: 86.6 FL — SIGNIFICANT CHANGE UP (ref 80–100)
MONOCYTES # BLD AUTO: 0.45 K/UL — SIGNIFICANT CHANGE UP (ref 0–0.9)
MONOCYTES NFR BLD AUTO: 10.8 % — SIGNIFICANT CHANGE UP (ref 2–14)
NEUTROPHILS # BLD AUTO: 2.53 K/UL — SIGNIFICANT CHANGE UP (ref 1.8–7.4)
NEUTROPHILS NFR BLD AUTO: 61.1 % — SIGNIFICANT CHANGE UP (ref 43–77)
NITRITE UR-MCNC: NEGATIVE — SIGNIFICANT CHANGE UP
NRBC # BLD: 0 /100 WBCS — SIGNIFICANT CHANGE UP (ref 0–0)
PH UR: 7 — SIGNIFICANT CHANGE UP (ref 5–8)
PLATELET # BLD AUTO: 231 K/UL — SIGNIFICANT CHANGE UP (ref 150–400)
POTASSIUM SERPL-MCNC: 3.6 MMOL/L — SIGNIFICANT CHANGE UP (ref 3.5–5.3)
POTASSIUM SERPL-SCNC: 3.6 MMOL/L — SIGNIFICANT CHANGE UP (ref 3.5–5.3)
PROT SERPL-MCNC: 7.4 G/DL — SIGNIFICANT CHANGE UP (ref 6–8.3)
PROT UR-MCNC: NEGATIVE MG/DL — SIGNIFICANT CHANGE UP
RBC # BLD: 5 M/UL — SIGNIFICANT CHANGE UP (ref 3.8–5.2)
RBC # FLD: 13.5 % — SIGNIFICANT CHANGE UP (ref 10.3–14.5)
SARS-COV-2 RNA SPEC QL NAA+PROBE: NEGATIVE — SIGNIFICANT CHANGE UP
SODIUM SERPL-SCNC: 135 MMOL/L — SIGNIFICANT CHANGE UP (ref 135–145)
SP GR SPEC: 1.02 — SIGNIFICANT CHANGE UP (ref 1–1.03)
UROBILINOGEN FLD QL: 2 E.U./DL
WBC # BLD: 4.15 K/UL — SIGNIFICANT CHANGE UP (ref 3.8–10.5)
WBC # FLD AUTO: 4.15 K/UL — SIGNIFICANT CHANGE UP (ref 3.8–10.5)

## 2022-05-19 PROCEDURE — 99285 EMERGENCY DEPT VISIT HI MDM: CPT

## 2022-05-19 PROCEDURE — 74177 CT ABD & PELVIS W/CONTRAST: CPT | Mod: 26,MA

## 2022-05-19 RX ORDER — IOHEXOL 300 MG/ML
30 INJECTION, SOLUTION INTRAVENOUS ONCE
Refills: 0 | Status: COMPLETED | OUTPATIENT
Start: 2022-05-19 | End: 2022-05-19

## 2022-05-19 RX ORDER — METOCLOPRAMIDE HCL 10 MG
10 TABLET ORAL ONCE
Refills: 0 | Status: COMPLETED | OUTPATIENT
Start: 2022-05-19 | End: 2022-05-19

## 2022-05-19 RX ORDER — CIPROFLOXACIN LACTATE 400MG/40ML
400 VIAL (ML) INTRAVENOUS ONCE
Refills: 0 | Status: COMPLETED | OUTPATIENT
Start: 2022-05-19 | End: 2022-05-19

## 2022-05-19 RX ORDER — MORPHINE SULFATE 50 MG/1
2 CAPSULE, EXTENDED RELEASE ORAL ONCE
Refills: 0 | Status: DISCONTINUED | OUTPATIENT
Start: 2022-05-19 | End: 2022-05-19

## 2022-05-19 RX ORDER — METRONIDAZOLE 500 MG
500 TABLET ORAL ONCE
Refills: 0 | Status: COMPLETED | OUTPATIENT
Start: 2022-05-19 | End: 2022-05-19

## 2022-05-19 RX ORDER — FAMOTIDINE 10 MG/ML
20 INJECTION INTRAVENOUS ONCE
Refills: 0 | Status: COMPLETED | OUTPATIENT
Start: 2022-05-19 | End: 2022-05-19

## 2022-05-19 RX ORDER — ONDANSETRON 8 MG/1
4 TABLET, FILM COATED ORAL ONCE
Refills: 0 | Status: COMPLETED | OUTPATIENT
Start: 2022-05-19 | End: 2022-05-19

## 2022-05-19 RX ORDER — SODIUM CHLORIDE 9 MG/ML
1000 INJECTION INTRAMUSCULAR; INTRAVENOUS; SUBCUTANEOUS ONCE
Refills: 0 | Status: COMPLETED | OUTPATIENT
Start: 2022-05-19 | End: 2022-05-19

## 2022-05-19 RX ADMIN — ONDANSETRON 4 MILLIGRAM(S): 8 TABLET, FILM COATED ORAL at 20:45

## 2022-05-19 RX ADMIN — SODIUM CHLORIDE 1000 MILLILITER(S): 9 INJECTION INTRAMUSCULAR; INTRAVENOUS; SUBCUTANEOUS at 21:45

## 2022-05-19 RX ADMIN — FAMOTIDINE 20 MILLIGRAM(S): 10 INJECTION INTRAVENOUS at 21:18

## 2022-05-19 RX ADMIN — Medication 10 MILLIGRAM(S): at 22:00

## 2022-05-19 RX ADMIN — IOHEXOL 30 MILLILITER(S): 300 INJECTION, SOLUTION INTRAVENOUS at 21:18

## 2022-05-19 RX ADMIN — SODIUM CHLORIDE 1000 MILLILITER(S): 9 INJECTION INTRAMUSCULAR; INTRAVENOUS; SUBCUTANEOUS at 20:45

## 2022-05-19 RX ADMIN — Medication 104 MILLIGRAM(S): at 21:30

## 2022-05-19 RX ADMIN — MORPHINE SULFATE 2 MILLIGRAM(S): 50 CAPSULE, EXTENDED RELEASE ORAL at 21:30

## 2022-05-19 RX ADMIN — MORPHINE SULFATE 2 MILLIGRAM(S): 50 CAPSULE, EXTENDED RELEASE ORAL at 21:18

## 2022-05-19 NOTE — ED PROVIDER NOTE - CLINICAL SUMMARY MEDICAL DECISION MAKING FREE TEXT BOX
31F with a h/o UC on mesalamine, followed by Dr. Snow, who p/w lower abdominal pain, nausea, and watery, nonbloody vomits and diarrhea since yesterday, associated with fevers. She has been taking ibuprofen with minimal relief (has been throwing it up). Sent in for workup by her GI. No CP/SOB, no dizziness or syncope, no urinary complaints.    VSS, exam as noted, +TTP Lower abd, plan for labs, IVFs, zofran, pepcid, CT a/p 31F with a h/o UC on mesalamine, followed by Dr. Snow, who p/w lower abdominal pain, nausea, and watery, nonbloody vomits and diarrhea since yesterday, associated with fevers. She has been taking ibuprofen with minimal relief (has been throwing it up). Sent in for workup by her GI. No CP/SOB, no dizziness or syncope, no urinary complaints.    VSS, exam as noted, +TTP Lower abd, plan for labs, IVFs, zofran, pepcid, CT a/p    Ct shows colitis. Pt still with N/V - she states her GI recommended she get admitted. Will start IV abx and admit to [facility] for IBD flare and further mgmt

## 2022-05-19 NOTE — ED PROVIDER NOTE - OBJECTIVE STATEMENT
31F with a h/o UC on mesalamine, followed by Dr. Snow, who p/w lower abdominal pain, nausea, and watery, nonbloody vomits and diarrhea since yesterday, associated with fevers. She has been taking ibuprofen with minimal relief (has been throwing it up). Sent in for workup by her GI. No CP/SOB, no dizziness or syncope, no urinary complaints.

## 2022-05-19 NOTE — ED ADULT NURSE NOTE - OBJECTIVE STATEMENT
31F with a h/o UC on mesalamine, followed by Dr. Snow, who p/w lower abdominal pain, nausea, and watery, nonbloody vomits and diarrhea since yesterday, associated with fevers. She has been taking ibuprofen with minimal relief (has been throwing it up). Sent in for workup by her GI. No CP/SOB, no dizziness or syncope, no urinary complaints

## 2022-05-19 NOTE — ED ADULT TRIAGE NOTE - CHIEF COMPLAINT QUOTE
pt c/o abd pain/ cramping, diarrhea, dry heaving, fever beginning yesterday. hx of ulcerative colitis, no relief with rx mesalamine and steroids, sent by GI md.

## 2022-05-19 NOTE — ED PROVIDER NOTE - PHYSICAL EXAMINATION
GEN: Well appearing, well developed, awake, alert, oriented to person, place, time/situation and in no apparent distress. NTAF  ENT: Airway patent, Nasal mucosa clear. Mouth with normal mucosa.  EYES: Clear bilaterally. PERRL, EOMI  RESPIRATORY: Breathing comfortably with normal RR. No W/C/R, no hypoxia or resp distress.  CARDIAC: Regular rate and rhythm, no M/R/G  ABDOMEN: Soft, mild lower abd TTP, nontender, +bowel sounds, no rebound, rigidity, or guarding.  MSK: Range of motion is not limited, no deformities noted.  NEURO: Alert and oriented, no focal deficits.  SKIN: Skin normal color for race, warm, dry and intact. No evidence of rash.  PSYCH: Alert and oriented to person, place, time/situation. normal mood and affect. no apparent risk to self or others.

## 2022-05-19 NOTE — ED PROVIDER NOTE - CROS ED CARDIOVAS ALL NEG
NEUROLOGY FOLLOW-UP    DATE OF SERVICE: 8/1/2018    Chief complaint: History of stroke  Chief Complaint   Patient presents with   • Follow-up     6 MONTH FOLLOW UP       Vitals:  Visit Vitals  /84 (BP Location: [facility] [place], Patient Position: Sitting)   Pulse 64   Ht 5' 10\" (1.778 m)   Wt 87.5 kg   BMI 27.66 kg/m²       HISTORY OF PRESENT ILLNESS     HPI   He is here for a six-month neurology follow-up. He continues to have episodic numbness in his right hand right face. He was diagnosed with left carotid stenosis and had CEA done about 2 weeks ago. He is recuperating well from the surgery. He was also noted to have worsening an increase in his aneurysm size. He was seen by Dr. Ballard and is scheduled to see her again. Most likely be going for aneurysm coiling. His headaches are improved. He denies any diplopia, dysarthria, dysphagia, chest pain, shortness of breath, or bowel set bladder symptoms. He continues to have episodes of anxiety.    Other significant problems:  Patient Active Problem List    Diagnosis Date Noted   • Left carotid stenosis 06/11/2018     Priority: Low   • Restless legs syndrome 01/31/2018     Priority: Low   • Numbness of right foot 06/28/2016     Priority: Low   • Right facial numbness 06/28/2016     Priority: Low   • History of stroke 05/17/2016     Priority: Low   • Other tenosynovitis of wrist flexor 03/22/2016     Priority: Low   • Numbness and tingling in left hand 01/06/2016     Priority: Low   • Carpal tunnel syndrome of left wrist 01/06/2016     Priority: Low   • H/O: stroke 07/20/2015     Priority: Low   • CVA (cerebral infarction) 04/16/2015     Priority: Low   • Right hand weakness 04/16/2015     Priority: Low   • Intracranial aneurysm 04/16/2015     Priority: Low   • 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     Priority: Low   • Essential hypertension, benign 12/15/2006     Priority: Low   • Olecranon bursitis 02/13/2004     Priority: Low       PAST MEDICAL, FAMILY AND  SOCIAL HISTORY     Medications:  Current Outpatient Prescriptions   Medication   • ALPRAZolam (XANAX) 0.5 MG tablet   • losartan (COZAAR) 100 MG tablet   • metoPROLOL tartrate (LOPRESSOR) 50 MG tablet   • atorvastatin (LIPITOR) 80 MG tablet   • gabapentin (NEURONTIN) 300 MG capsule   • hydrochlorothiazide (HYDRODIURIL) 25 MG tablet   • gabapentin (NEURONTIN) 300 MG capsule   • escitalopram (LEXAPRO) 10 MG tablet   • clotrimazole-betamethasone (LOTRISONE) 1-0.05 % cream   • aspirin 81 MG tablet   • diphenhydrAMINE (BENADRYL) 50 MG capsule   • diphenhydrAMINE (BENADRYL) 50 MG capsule     No current facility-administered medications for this visit.        Allergies:  ALLERGIES:   Allergen Reactions   • Contrast Media ANAPHYLAXIS     As a child not sure if it is Iodine or jace. Pre-medicated for both       Past Medical  History/Surgeries:  Past Medical History:   Diagnosis Date   • Allergy    • Aneurysm (CMS/HCC)     MRI Brain X1   • Carotid artery disease (CMS/HCC)     Left 65% blockage    • Cerebral infarction (CMS/HCC) 3/31/2015    Right hand  Numbness toes right foot   • Essential hypertension, benign 12/15/2006   • Herpes zoster without mention of complication    • High cholesterol    • Migraine    • Stroke (CMS/HCC)        Past Surgical History:   Procedure Laterality Date   • Appendectomy     • Colonoscopy w biopsy  2017    Dr. Beltran Harrison. 3 tubular adenomas, 2 hyperplastic polyps.   • Endarterectomy Left 2018    Left carotid by Dr. Park at [facility]   • Exc skin benig >4cm face,facial  05    Bilateral, Dr. mak   • Hand/finger surgery unlisted Right 2009     LNG FNGR MASS EXC  Dr. Naylor   • Open coronary endarterectomy Left 2018    left Carotid endarterectomy   • Tonsillectomy and adenoidectomy  as child       Family History:  Family History   Problem Relation Age of Onset   • Gastrointestinal Brother         bleeding ulcer   • Blood Disorder Father          of lung  cancer   • Asthma Mother         alive at 70   • Neurological Disorder Mother         brain tumor removed   • Neurological Disorder Paternal Grandmother          of parkinson's   • Hypertension Maternal Grandmother    • Heart Maternal Grandmother        Social History:  Social History   Substance Use Topics   • Smoking status: Former Smoker     Packs/day: 0.25     Types: Cigarettes     Start date: 3/31/1986     Quit date: 3/30/2015   • Smokeless tobacco: Never Used   • Alcohol use 3.0 oz/week     6 Standard drinks or equivalent per week      Comment: weekends       REVIEW OF SYSTEMS     Review of Systems  Review of systems    Constitutional:  Patient denies fever, chills, tiredness or malaise.    Eyes:  Denies change in visual acuity, pain, burning or itching.    Immunologic:  Denies hives, seasonal allergies.    HENT:  Denies sinus problems, ear infections, nasal congestion or sore throat.    Respiratory:  Denies cough, shortness of breath.    Cardiovascular:  Denies chest pain, shortness of breath or edema.    GI:  Denies abdominal pain, nausea, vomiting, bloody stools or diarrhea.    :  Denies urine retention, painful urination, urinary frequency, blood in urine or nocturia.    Musculoskeletal:  Denies back pain, neck pain, joint pain or leg swelling.    Integument:  Denies rash, itching.    Neurologic:  Denies headache, double vision, dysphagia, dizziness, focal weakness or sensory changes.    Endocrine:  Denies polyuria, polydipsia or temperature intolerance.    Lymphatic:  Denies swollen glands, weight loss.    All other systems reviewed and negative      PHYSICAL EXAM     Physical Exam  General appearance: Well-nourished and healthy-appearing adult.  HHENT: Normocephalic and atraumatic.  Neck: Supple, no JVD or bruit.  Heart: S1 and S2 heard.  Lungs: Clear to auscultation.    NEUROLOGICAL EXAM:  Mental Status: AAOx3. Speech was fluent and language was intact. The patient's fund of knowledge was normal.  Higher integrated functions were normal. Recent and remote memory were intact. Affect and mood were normal.  Cranial Nerves:  Pupils were round, equal and reactive to light. Extra occular movements were intact. Fundi were normal. Optic disc margins were normal. Visual fields were full on confrontation. Face was symmetric. Facial sensations were intact. High frequency hearing was normal. Tongue was midline. Palate moved symmetrically. Shoulder shrug was normal on both sides. Sternocleidomastoid strength was normal both sides. Other cranial nerves were intact.  Motor:  There was no drift. Muscle bulk, tone and strength was normal in all 4 extremities involving the proximal and distal muscle groups. He does have mild clumsiness on his right side.  Deep tendon reflexes were symmetric. Plantars were down-going.  Sensory:  Pinprick, temperature, vibration and position sensations were intact.  Coordination: Finger to nose and heel to shin were normal. Rapid alternating movements were normal. Romberg was negative.  Gait: Station and gait were normal.     ASSESSMENT/PLAN     1. History of stroke.  2. Right hand and right facial episodic numbness.  3. Left carotid stenosis status post recent CVA.  4. Intracranial aneurysm    Pathophysiology, workup, and treatment options were discussed in detail with the patient. He was counseled about secondary stroke prevention and risk factor modification. I will continue him on it was staffed in an aspirin. He may continue gabapentin for his paresthesias. He recently had CEA done on the left side and is recuperating well from surgery. He is also scheduled to see interventional neurologist on August 30. He is going to follow-up with me in 6 months. He was advised to come back early if his any worsening of her symptoms. Various ways to cope up with anxiety were discussed. I appreciate this opportunity evaluate him and please feel free to contact me if you have any further  questions.    Thank you,    Shaik Daniel M.D.   negative...

## 2022-05-19 NOTE — ED ADULT NURSE NOTE - CAS EDP DISCH DISPOSITION ADMI
ED [facility] 14/[facility] ED [facility] 14/76[address]/[facility]

## 2022-05-20 ENCOUNTER — INPATIENT (INPATIENT)
Facility: HOSPITAL | Age: 32
LOS: 0 days | Discharge: HOME | DRG: 391 | End: 2022-05-21
Attending: STUDENT IN AN ORGANIZED HEALTH CARE EDUCATION/TRAINING PROGRAM | Admitting: INTERNAL MEDICINE
Payer: COMMERCIAL

## 2022-05-20 ENCOUNTER — TRANSCRIPTION ENCOUNTER (OUTPATIENT)
Age: 32
End: 2022-05-20

## 2022-05-20 ENCOUNTER — RESULT REVIEW (OUTPATIENT)
Age: 32
End: 2022-05-20

## 2022-05-20 DIAGNOSIS — Z00.00 ENCOUNTER FOR GENERAL ADULT MEDICAL EXAMINATION WITHOUT ABNORMAL FINDINGS: ICD-10-CM

## 2022-05-20 DIAGNOSIS — R11.2 NAUSEA WITH VOMITING, UNSPECIFIED: ICD-10-CM

## 2022-05-20 DIAGNOSIS — K51.90 ULCERATIVE COLITIS, UNSPECIFIED, WITHOUT COMPLICATIONS: ICD-10-CM

## 2022-05-20 DIAGNOSIS — K51.00 ULCERATIVE (CHRONIC) PANCOLITIS WITHOUT COMPLICATIONS: ICD-10-CM

## 2022-05-20 DIAGNOSIS — J45.909 UNSPECIFIED ASTHMA, UNCOMPLICATED: ICD-10-CM

## 2022-05-20 LAB
ALBUMIN SERPL ELPH-MCNC: 3.6 G/DL — SIGNIFICANT CHANGE UP (ref 3.3–5)
ALP SERPL-CCNC: 55 U/L — SIGNIFICANT CHANGE UP (ref 40–120)
ALT FLD-CCNC: 20 U/L — SIGNIFICANT CHANGE UP (ref 10–45)
ANION GAP SERPL CALC-SCNC: 9 MMOL/L — SIGNIFICANT CHANGE UP (ref 5–17)
AST SERPL-CCNC: 27 U/L — SIGNIFICANT CHANGE UP (ref 10–40)
BILIRUB SERPL-MCNC: 0.4 MG/DL — SIGNIFICANT CHANGE UP (ref 0.2–1.2)
BUN SERPL-MCNC: 6 MG/DL — LOW (ref 7–23)
C DIFF GDH STL QL: NEGATIVE — SIGNIFICANT CHANGE UP
C DIFF GDH STL QL: SIGNIFICANT CHANGE UP
CALCIUM SERPL-MCNC: 8.2 MG/DL — LOW (ref 8.4–10.5)
CHLORIDE SERPL-SCNC: 107 MMOL/L — SIGNIFICANT CHANGE UP (ref 96–108)
CO2 SERPL-SCNC: 20 MMOL/L — LOW (ref 22–31)
CREAT SERPL-MCNC: 0.57 MG/DL — SIGNIFICANT CHANGE UP (ref 0.5–1.3)
CULTURE RESULTS: SIGNIFICANT CHANGE UP
EGFR: 124 ML/MIN/1.73M2 — SIGNIFICANT CHANGE UP
GLUCOSE SERPL-MCNC: 113 MG/DL — HIGH (ref 70–99)
HCT VFR BLD CALC: 39.1 % — SIGNIFICANT CHANGE UP (ref 34.5–45)
HGB BLD-MCNC: 12.7 G/DL — SIGNIFICANT CHANGE UP (ref 11.5–15.5)
MAGNESIUM SERPL-MCNC: 2.1 MG/DL — SIGNIFICANT CHANGE UP (ref 1.6–2.6)
MCHC RBC-ENTMCNC: 28.3 PG — SIGNIFICANT CHANGE UP (ref 27–34)
MCHC RBC-ENTMCNC: 32.5 GM/DL — SIGNIFICANT CHANGE UP (ref 32–36)
MCV RBC AUTO: 87.3 FL — SIGNIFICANT CHANGE UP (ref 80–100)
NRBC # BLD: 0 /100 WBCS — SIGNIFICANT CHANGE UP (ref 0–0)
PHOSPHATE SERPL-MCNC: 2.7 MG/DL — SIGNIFICANT CHANGE UP (ref 2.5–4.5)
PLATELET # BLD AUTO: 181 K/UL — SIGNIFICANT CHANGE UP (ref 150–400)
POTASSIUM SERPL-MCNC: 4.1 MMOL/L — SIGNIFICANT CHANGE UP (ref 3.5–5.3)
POTASSIUM SERPL-SCNC: 4.1 MMOL/L — SIGNIFICANT CHANGE UP (ref 3.5–5.3)
PROT SERPL-MCNC: 6.3 G/DL — SIGNIFICANT CHANGE UP (ref 6–8.3)
RBC # BLD: 4.48 M/UL — SIGNIFICANT CHANGE UP (ref 3.8–5.2)
RBC # FLD: 13.6 % — SIGNIFICANT CHANGE UP (ref 10.3–14.5)
SODIUM SERPL-SCNC: 136 MMOL/L — SIGNIFICANT CHANGE UP (ref 135–145)
SPECIMEN SOURCE: SIGNIFICANT CHANGE UP
WBC # BLD: 2.62 K/UL — LOW (ref 3.8–10.5)
WBC # FLD AUTO: 2.62 K/UL — LOW (ref 3.8–10.5)

## 2022-05-20 PROCEDURE — 43239 EGD BIOPSY SINGLE/MULTIPLE: CPT

## 2022-05-20 PROCEDURE — 88305 TISSUE EXAM BY PATHOLOGIST: CPT | Mod: 26

## 2022-05-20 PROCEDURE — 99222 1ST HOSP IP/OBS MODERATE 55: CPT | Mod: 25

## 2022-05-20 PROCEDURE — 88342 IMHCHEM/IMCYTCHM 1ST ANTB: CPT | Mod: 26

## 2022-05-20 PROCEDURE — 99223 1ST HOSP IP/OBS HIGH 75: CPT

## 2022-05-20 RX ORDER — KETOROLAC TROMETHAMINE 30 MG/ML
15 SYRINGE (ML) INJECTION EVERY 8 HOURS
Refills: 0 | Status: DISCONTINUED | OUTPATIENT
Start: 2022-05-20 | End: 2022-05-20

## 2022-05-20 RX ORDER — OXYCODONE AND ACETAMINOPHEN 5; 325 MG/1; MG/1
1 TABLET ORAL EVERY 4 HOURS
Refills: 0 | Status: DISCONTINUED | OUTPATIENT
Start: 2022-05-20 | End: 2022-05-20

## 2022-05-20 RX ORDER — METRONIDAZOLE 500 MG
500 TABLET ORAL EVERY 8 HOURS
Refills: 0 | Status: DISCONTINUED | OUTPATIENT
Start: 2022-05-20 | End: 2022-05-20

## 2022-05-20 RX ORDER — POTASSIUM CHLORIDE 20 MEQ
20 PACKET (EA) ORAL
Refills: 0 | Status: COMPLETED | OUTPATIENT
Start: 2022-05-20 | End: 2022-05-20

## 2022-05-20 RX ORDER — TRAMADOL HYDROCHLORIDE 50 MG/1
25 TABLET ORAL ONCE
Refills: 0 | Status: DISCONTINUED | OUTPATIENT
Start: 2022-05-20 | End: 2022-05-20

## 2022-05-20 RX ORDER — ALBUTEROL 90 UG/1
2 AEROSOL, METERED ORAL
Qty: 0 | Refills: 0 | DISCHARGE

## 2022-05-20 RX ORDER — ACETAMINOPHEN 500 MG
1000 TABLET ORAL ONCE
Refills: 0 | Status: COMPLETED | OUTPATIENT
Start: 2022-05-20 | End: 2022-05-20

## 2022-05-20 RX ORDER — ONDANSETRON 8 MG/1
4 TABLET, FILM COATED ORAL THREE TIMES A DAY
Refills: 0 | Status: DISCONTINUED | OUTPATIENT
Start: 2022-05-20 | End: 2022-05-21

## 2022-05-20 RX ORDER — FAMOTIDINE 10 MG/ML
20 INJECTION INTRAVENOUS EVERY 24 HOURS
Refills: 0 | Status: DISCONTINUED | OUTPATIENT
Start: 2022-05-20 | End: 2022-05-21

## 2022-05-20 RX ORDER — METRONIDAZOLE 500 MG
500 TABLET ORAL
Refills: 0 | Status: DISCONTINUED | OUTPATIENT
Start: 2022-05-20 | End: 2022-05-20

## 2022-05-20 RX ORDER — ONDANSETRON 8 MG/1
4 TABLET, FILM COATED ORAL ONCE
Refills: 0 | Status: COMPLETED | OUTPATIENT
Start: 2022-05-20 | End: 2022-05-20

## 2022-05-20 RX ORDER — SODIUM CHLORIDE 9 MG/ML
1000 INJECTION INTRAMUSCULAR; INTRAVENOUS; SUBCUTANEOUS
Refills: 0 | Status: DISCONTINUED | OUTPATIENT
Start: 2022-05-20 | End: 2022-05-21

## 2022-05-20 RX ORDER — IBUPROFEN 200 MG
600 TABLET ORAL EVERY 6 HOURS
Refills: 0 | Status: DISCONTINUED | OUTPATIENT
Start: 2022-05-20 | End: 2022-05-20

## 2022-05-20 RX ORDER — CIPROFLOXACIN LACTATE 400MG/40ML
500 VIAL (ML) INTRAVENOUS EVERY 12 HOURS
Refills: 0 | Status: DISCONTINUED | OUTPATIENT
Start: 2022-05-20 | End: 2022-05-20

## 2022-05-20 RX ADMIN — Medication 50 MILLIEQUIVALENT(S): at 01:25

## 2022-05-20 RX ADMIN — ONDANSETRON 4 MILLIGRAM(S): 8 TABLET, FILM COATED ORAL at 20:37

## 2022-05-20 RX ADMIN — Medication 1000 MILLIGRAM(S): at 15:17

## 2022-05-20 RX ADMIN — ONDANSETRON 4 MILLIGRAM(S): 8 TABLET, FILM COATED ORAL at 09:52

## 2022-05-20 RX ADMIN — Medication 400 MILLIGRAM(S): at 14:17

## 2022-05-20 RX ADMIN — Medication 600 MILLIGRAM(S): at 05:15

## 2022-05-20 RX ADMIN — ONDANSETRON 4 MILLIGRAM(S): 8 TABLET, FILM COATED ORAL at 18:00

## 2022-05-20 RX ADMIN — ONDANSETRON 4 MILLIGRAM(S): 8 TABLET, FILM COATED ORAL at 05:01

## 2022-05-20 RX ADMIN — Medication 15 MILLIGRAM(S): at 10:52

## 2022-05-20 RX ADMIN — Medication 50 MILLIEQUIVALENT(S): at 03:00

## 2022-05-20 RX ADMIN — FAMOTIDINE 20 MILLIGRAM(S): 10 INJECTION INTRAVENOUS at 11:54

## 2022-05-20 RX ADMIN — TRAMADOL HYDROCHLORIDE 25 MILLIGRAM(S): 50 TABLET ORAL at 21:00

## 2022-05-20 RX ADMIN — TRAMADOL HYDROCHLORIDE 25 MILLIGRAM(S): 50 TABLET ORAL at 22:00

## 2022-05-20 RX ADMIN — Medication 100 MILLIGRAM(S): at 00:00

## 2022-05-20 RX ADMIN — SODIUM CHLORIDE 75 MILLILITER(S): 9 INJECTION INTRAMUSCULAR; INTRAVENOUS; SUBCUTANEOUS at 11:53

## 2022-05-20 RX ADMIN — Medication 15 MILLIGRAM(S): at 09:52

## 2022-05-20 RX ADMIN — Medication 200 MILLIGRAM(S): at 00:25

## 2022-05-20 RX ADMIN — Medication 500 MILLIGRAM(S): at 08:03

## 2022-05-20 RX ADMIN — Medication 20 MILLIGRAM(S): at 04:58

## 2022-05-20 RX ADMIN — Medication 500 MILLIGRAM(S): at 01:00

## 2022-05-20 NOTE — CONSULT NOTE ADULT - ATTENDING COMMENTS
Patient seen with fellow.  Plan for EGD today for h/o melena   Recommend to get the initial Colonoscopy report for the reported Ulcerative Colitis diagnosis.  Current symptoms likely secondary to Norovirus, no plan for Colonoscopy at this time  Rest plan as per fellow note

## 2022-05-20 NOTE — DISCHARGE NOTE PROVIDER - NSDCCPCAREPLAN_GEN_ALL_CORE_FT
PRINCIPAL DISCHARGE DIAGNOSIS  Diagnosis: Acute colitis  Assessment and Plan of Treatment: You had colitis likely secondary to your infection with Norovirus  Norovirus is a viral infection that is contagious causing diarrhea, nausea and vomiting. This is also likely what caused your GI pain bringing you to the hospital.      SECONDARY DISCHARGE DIAGNOSES  Diagnosis: UC (ulcerative colitis)  Assessment and Plan of Treatment: You have a history of ulcerative colitis for which you follow with Dr Snow with  Please continue to follow up with him and take your mesalamine    Diagnosis: Melena  Assessment and Plan of Treatment: You arrived with black stools which you underwent a EGD for further evaluation of why.       PRINCIPAL DISCHARGE DIAGNOSIS  Diagnosis: Acute colitis  Assessment and Plan of Treatment: You had colitis likely secondary to your infection with Norovirus  Norovirus is a viral infection that is contagious causing diarrhea, nausea and vomiting. This is also likely what caused your GI pain bringing you to the hospital. Our GI team saw you and there is no recommended treatment. However, since Norovirus is very contagious we are recommending you do not return to work on 5/22 and take a few days off at home. Continue advancing your diet as tolerated. You should continue to take zofran as needed, pepcid once a day and you can take tramadol for severe pain. All of the prescriptions were sent to [facility] to  on the way out. Please follow up with Dr Snow in the next few weeks.      SECONDARY DISCHARGE DIAGNOSES  Diagnosis: UC (ulcerative colitis)  Assessment and Plan of Treatment: You have a history of ulcerative colitis for which you follow with Dr Snow with. However, our GI team is not convinced that you actually have ulcerative colitis, for that reason we are recommending that you stop taking the mesalamine. Please follow up with Dr Snow for further management.      Diagnosis: Melena  Assessment and Plan of Treatment: You arrived with black stools which you underwent a EGD for further evaluation of why. The endoscopy showed non-specific inflammation in your stomach and we took a few biopsies to rule out any underlying condition, however, at this time we do not believe it is anything serious. Please avoid taking any NSAIDs (advil, motrin, ibuprofen, alleve etc). Follow up with Dr Snow

## 2022-05-20 NOTE — H&P ADULT - ASSESSMENT
31F with a h/o UC on mesalamine, followed by Dr. Snow, who p/w lower abdominal pain, nausea, and watery, nonbloody vomits and diarrhea since yesterday, associated with fevers admitted to the [facility] for UC flare up management.

## 2022-05-20 NOTE — DISCHARGE NOTE PROVIDER - NSDCMRMEDTOKEN_GEN_ALL_CORE_FT
mesalamine 1.2 g oral delayed release tablet: 2 tab(s) orally 2 times a day AM and PM    famotidine 20 mg oral tablet: 1 tab(s) orally every 24 hours  Zofran 4 mg oral tablet: 1 tab(s) orally every 8 hours   famotidine 20 mg oral tablet: 1 tab(s) orally every 24 hours  traMADol 50 mg oral tablet: 0.5 tab(s) orally once a day, As Needed MDD:0,5 tab  Zofran 4 mg oral tablet: 1 tab(s) orally every 8 hours

## 2022-05-20 NOTE — H&P ADULT - NSHPLABSRESULTS_GEN_ALL_CORE
.  LABS:                         14.6   4.15  )-----------( 231      ( 19 May 2022 20:53 )             43.3         135  |  102  |  11  ----------------------------<  106<H>  3.6   |  22  |  0.64    Ca    8.6      19 May 2022 20:53    TPro  7.4  /  Alb  4.1  /  TBili  0.5  /  DBili  x   /  AST  24  /  ALT  19  /  AlkPhos  70        Urinalysis Basic - ( 19 May 2022 20:57 )    Color: Yellow / Appearance: Clear / S.020 / pH: x  Gluc: x / Ketone: Trace mg/dL  / Bili: Negative / Urobili: 2.0 E.U./dL   Blood: x / Protein: NEGATIVE mg/dL / Nitrite: NEGATIVE   Leuk Esterase: NEGATIVE / RBC: x / WBC x   Sq Epi: x / Non Sq Epi: x / Bacteria: x            Lactate, Blood: 1.3 mmol/L ( @ 20:53)      RADIOLOGY, EKG & ADDITIONAL TESTS: Reviewed.

## 2022-05-20 NOTE — CHART NOTE - NSCHARTNOTEFT_GEN_A_CORE
EGD performed in endoscopy suite with Dr Mohr and myself, findings as noted below:    Impression:  -Normal esophagus.  -Normal duodenum.  -Erythema in the antrum compatible with non-erosive gastritis. (Biopsy).    Plan:	  -Await pathology results.  -Clear Liquid Diet
Rx Written	Rx Dispensed	Drug	Quantity	Days Supply	Prescriber Name	Prescriber Génesis #	Payment Method	Dispenser  01/07/2022	01/07/2022	virtussin ac  mg/5 ml lq	473ml	16	Pedro Camilo MD	[id number]	Insurance	[employer]    None in [facility]
Brief Update  Pt seen this morning w teaching team.    31F w h/o ulcerative colitis (Follows w Dr. Snow at [facility]) on BID mesalamine, p/w N/V, Lower abd pain, subjective fever 101 with 3 episodes of dark, explosive diarrhea, feels it is different from monthly episodes she manages w mesalamine enema + NSAIDs due to increased pain, CT A/P shows mild colitis c/f gastroenteritis vs UC flare -- found to be positive for norovirus on GI PCR; negative for C diff.    Pt reports taking 1 dose of imodium prior to arrival. Received steroids, Cipro, flagyl in ED. Since admission, pt has not had any subsequent emesis or diarrhea. Reports nausea this morning and decreased appetite. Pain has improved but localized in RLQ. Exam shows female in NAD, RRR, CTAB, abd soft, RLQ tenderness wo rebound or guarding, NABS.     #Gastroenteritis d/t norovirus - supportive care. Pt denies sick contacts, recent travel.   #Ulcerative colitis - home on mesalamine. Follows w Dr. Snow    Plan  Would hold abx at this time. Nausea and vomiting not consistent w UC flare.   Supportive care for norovirus  GI Consulted - NPO for EGD today. DC steroids and hold NSAIDs    DISPO: Home pending EGD, tolerating soft diet, GI final recs

## 2022-05-20 NOTE — H&P ADULT - PROBLEM SELECTOR PLAN 1
Started yesterday ..... WBC 4.15, hb 14.6, no fever, elevated CRP   s/p Zofran and reglan in ED   DDx: UC flare up vs less likley c.diff vs gastrointestinal diarrhea, food poisoning  -Zofran PRN for N/v   -Monitor qtc if required more zofran   -CRP 21.6  -f/u GI PCR and c.diff   -s/p Ciprofloxacine 400mg IV and Metronidazole 500mg IV   - s/p 2mg Morphine for abdominal pain  CT abdomen: Mild pancolitis consistent with h/o UC Started abdominal cramp, N/V yesterday morning and got worse after lunch from outside. Black watery mucus like diarrhea with fever Tmax 101.2 at home. ibuprofen did not help in pain. she did not take oxycodone since she scares of narcotic.  her fiance did not have any GI symptoms after having the same food.   WBC 4.15, hb 14.6, no fever, elevated CRP   CT abdomen: Mild pancolitis consistent with h/o UC  DDx: UC flare up vs less likley c.diff vs less likely gastrointestinal diarrhea/food poisoning  - s/p Zofran and reglan in ED   -Zofran PRN for N/v   -Monitor qtc if required more zofran   -CRP 21.6  -f/u GI PCR and c.diff   -s/p Ciprofloxacine 400mg IV and Metronidazole 500mg IV   - s/p 2mg Morphine for abdominal pain  -Ciprofloxacin 500mg BID PO c/w 8 weeks   -Metronidazole 500mg BID PO c/w 8 weeks Started abdominal cramp, N/V yesterday morning and got worse after lunch from outside. Black watery mucus like diarrhea with fever Tmax 101.2 at home. ibuprofen did not help in pain. she did not take oxycodone since she scares of narcotic.  her fiance did not have any GI symptoms after having the same food.   WBC 4.15, hb 14.6, no fever, elevated CRP   CT abdomen: Mild pancolitis consistent with h/o UC  DDx: UC flare up vs less likley c.diff vs less likely gastrointestinal diarrhea/food poisoning  - s/p Zofran and reglan in ED   -Zofran PRN for N/v   -Monitor qtc if required more zofran   -CRP 21.6  -f/u GI PCR and c.diff   -s/p Ciprofloxacine 400mg IV and Metronidazole 500mg IV   - s/p 2mg Morphine for abdominal pain  -Ciprofloxacin 500mg q12hr PO c/w 8 weeks   -Metronidazole 500mg q8hr PO c/w 8 weeks

## 2022-05-20 NOTE — H&P ADULT - NSHPPHYSICALEXAM_GEN_ALL_CORE
Constitutional: female/male, WDWN, NAD  HEENT: PERRL, EOMI, sclera non-icteric, neck supple, trachea midline, no masses, no JVD, MMM, good dentition  Respiratory: CTA b/l, good air entry b/l, no wheezing, no rhonchi, no rales, without accessory muscle use and no intercostal retractions  Cardiovascular: RRR, normal S1S2, no M/R/G  Gastrointestinal: soft, NTND, no masses palpable, BS normal  Extremities: Warm, well perfused, pulses equal bilateral upper and lower extremities, no edema, no clubbing  Neurological: AAOx3, CN Grossly intact  Skin: Normal temperature, warm, dry Constitutional: female, in mild disctress due to abdominal cramp   HEENT: PERRL, EOMI, sclera non-icteric, neck supple, trachea midline, no masses, no JVD, MMM, good dentition  Respiratory: CTA b/l, good air entry b/l, no wheezing, no rhonchi, no rales, without accessory muscle use and no intercostal retractions  Cardiovascular: RRR, normal S1S2, no M/R/G  Gastrointestinal: soft, Mild RLQ tenderness in deep pressure, no masses palpable, BS normal  Extremities: Warm, well perfused, pulses equal bilateral upper and lower extremities, no edema, no clubbing  Neurological: AAOx3, CN Grossly intact  Skin: Normal temperature, warm, dry

## 2022-05-20 NOTE — H&P ADULT - PROBLEM SELECTOR PLAN 2
h/o UC on mesalamine, followed by Dr. Snow, p/w lower abdominal pain, nausea, and watery, nonbloody vomits and diarrhea since yesterday,.  WBC 4.15, hb 14.6, no fever, elevated CRP   Last colonoscopy:   Home medication: Mesalamine  CT abdomen: Mild pancolitis consistent with h/o UC   -Zofran PRN for N/v   -Monitor qtc if required more zofran   -CRP 21.6  -f/u GI PCR and c.diff   -s/p Ciprofloxacine 400mg IV and Metronidazole 500mg IV   - s/p 2mg Morphine for abdominal pain  -GI consult in AM h/o UC on mesalamine, followed by Dr. Snow, p/w lower abdominal pain, nausea, and watery, nonbloody vomits and diarrhea since yesterday. Black watery mucus like diarrhea with fever Tmax 101.2 at home, , elevated CRP, and CT abdomen: Mild pancolitis consistent with h/o UC confirm UC flare up.   on admission WBC 4.15, hb 14.6, no fever  Last colonoscopy: couple months ago  Home medication: Mesalamine, not on prednisone recently     -Zofran PRN for N/v   -Monitor qtc if required more zofran   -CRP 21.6  -f/u GI PCR and c.diff   -s/p Ciprofloxacine 400mg IV and Metronidazole 500mg IV   - s/p 2mg Morphine for abdominal pain  -Ciprofloxacin 500mg BID PO c/w 8 weeks   -Metronidazole 500mg BID PO c/w 8 weeks  -  Consider start intravenous glucocorticoid include methylprednisolone (16 to 20 mg intravenously [IV] every eight hours) or hydrocortisone (100 mg IV every eight hours). Methylprednisolone is preferred because that formulation is associated with less sodium-retention and potassium-wasting than hydrocortis  -Per ptt Dr. snow recommended immune therapy before, but for unknown reason, has not been started yet.   -GI consult in AM h/o UC on mesalamine, followed by Dr. Snow, p/w lower abdominal pain, nausea, and watery, nonbloody vomits and diarrhea since yesterday. Black watery mucus like diarrhea with fever Tmax 101.2 at home, , elevated CRP, and CT abdomen: Mild pancolitis consistent with h/o UC confirm UC flare up.   on admission WBC 4.15, hb 14.6, no fever  Last colonoscopy: couple months ago  Home medication: Mesalamine, not on prednisone recently     -Zofran PRN for N/v   -Monitor qtc if required more zofran   -CRP 21.6  -f/u GI PCR and c.diff   -s/p Ciprofloxacine 400mg IV and Metronidazole 500mg IV   - s/p 2mg Morphine for abdominal pain  -Ciprofloxacin 500mg BID PO c/w 8 weeks   -Metronidazole 500mg BID PO c/w 8 weeks  -  Consider start intravenous glucocorticoid include methylprednisolone (16 to 20 mg intravenously [IV] every eight hours) or hydrocortisone (100 mg IV every eight hours). Methylprednisolone is preferred because that formulation is associated with less sodium-retention and potassium-wasting than hydrocortis  -Per ptt Dr. snow recommended immune therapy before, but for unknown reason, has not been started yet.   Oxycodone PRN for sever pain and ibuprofen fr moderate pain   -GI consult in AM h/o UC on mesalamine, followed by Dr. Snow, p/w lower abdominal pain, nausea, and watery, nonbloody vomits and diarrhea since yesterday. Black watery mucus like diarrhea with fever Tmax 101.2 at home, , elevated CRP, and CT abdomen: Mild pancolitis consistent with h/o UC confirm UC flare up.   on admission WBC 4.15, hb 14.6, no fever  Last colonoscopy: couple months ago  Home medication: Mesalamine, not on prednisone recently     -Zofran PRN for N/v   -Monitor qtc if required more zofran   -CRP 21.6  -f/u GI PCR and c.diff   -s/p Ciprofloxacine 400mg IV and Metronidazole 500mg IV   - s/p 2mg Morphine for abdominal pain  -Ciprofloxacin 500mg q12hr PO c/w 8 weeks   -Metronidazole 500mg q8hr PO c/w 8 weeks  -  Intravenous glucocorticoid include methylprednisolone 20 mg intravenously [IV] every eight hours  -Per ptt Dr. snow recommended immune therapy before, but for unknown reason, has not been started yet.   -Oxycodone PRN for sever pain and ibuprofen fr moderate pain   -GI consult in AM h/o UC on mesalamine, followed by Dr. Snow, p/w lower abdominal pain, nausea, and watery, nonbloody vomits and diarrhea since yesterday. Black watery mucus like diarrhea with fever Tmax 101.2 at home, , elevated CRP, and CT abdomen: Mild pancolitis consistent with h/o UC confirm UC flare up.   on admission WBC 4.15, hb 14.6, no fever  Last colonoscopy: couple months ago, unremarkable. Shes been done several colonoscopy in past 3 years   Home medication: Mesalamine, not on prednisone recently     -Zofran PRN for N/v   -Monitor qtc if required more zofran   -CRP 21.6  -f/u GI PCR and c.diff   -s/p Ciprofloxacine 400mg IV and Metronidazole 500mg IV   - s/p 2mg Morphine for abdominal pain  -Ciprofloxacin 500mg q12hr PO c/w 8 weeks   -Metronidazole 500mg q8hr PO c/w 8 weeks  -  Intravenous glucocorticoid include methylprednisolone 20 mg intravenously [IV] every eight hours  -Per pt Dr. snow recommended immune therapy before, but for unknown reason, has not been started yet.   -Oxycodone PRN for sever pain and ibuprofen fr moderate pain   -GI consult in AM

## 2022-05-20 NOTE — H&P ADULT - HISTORY OF PRESENT ILLNESS
31F with a h/o UC on mesalamine, followed by Dr. Snow, who p/w lower abdominal pain, nausea, and watery, nonbloody vomits and diarrhea since yesterday, associated with fevers. She has been taking ibuprofen with minimal relief (has been throwing it up). Sent in for workup by her GI. No CP/SOB, no dizziness or syncope, no urinary complaints.        ED:  VS: Tmax: 99, HR 78, /74, RR 17/99% RA  Lab: CBc normal. K 3.6. lactate 1.3, CRP 21.6    Treatment:  Ciprofloxacine 400mg IV and Metronidazole 500mg IV , zofran, reglan , 2mg Morphine, 1 L NS  31F with a h/o UC diagnosed almost 3 years ago on mesalamine, followed by Dr. Snow, who p/w lower right side abdominal pain, nausea, and watery, nonbloody vomits and diarrhea since yesterday, associated with fevers. She reported her Tmax at home 101 yesterday.  She has been taking ibuprofen with minimal relief (has been throwing it up). She started having abdominal cramp, and nausea yesterday morning and after her lunch (chicken, carrot and potato from restaurant that they always order) she started having black watery mucus diarrhea with fever and N/V. Her fiance had the same food but no symptoms for food poisoning. The last meal she had last night, she vomited and called her PCP and Dr. Snow, they recommended come to the ED. The main reason she came to the ED was abdominal cramp, and body pain did nor relieved with Ibuprofen. She was prescribed Oxycodone before for pain at home, but she never took it since she scares of narcotics.   She also reports childhood asthma, which resolved and the only time that she used inhaler was last year when she was diagnosed with COVID and she needed for SOB.  During interview, she had intermittent abdominal cramp and body pain, and used bathroom several times, but not diarrhea this time, just small hard stool with no blood.   She denies HA, dizziness, SOB, CP, urinary symptoms/changes. She reports compliant with mesalamine         ED:  VS: Tmax: 99, HR 78, /74, RR 17/99% RA  Lab: CBc normal. K 3.6. lactate 1.3, CRP 21.6    Treatment:  Ciprofloxacine 400mg IV and Metronidazole 500mg IV , zofran, reglan , 2mg Morphine, 1 L NS

## 2022-05-20 NOTE — ED ADULT NURSE REASSESSMENT NOTE - NS ED NURSE REASSESS COMMENT FT1
Charge RN: at this time, patient assigned to 7610 r/o c-diff. Sample sent, pending result in the morning. Patient aware that if sample were to test negative for cdiff, patient to be moved to semi-private room. Patient verbalized understanding. Accepting 7 Uris RN David made aware.
Dr. De La Cruz at bedside for evaluation
Prelim CT back, GI consulted per Dr. De La Cruz
additional orders received as noted, abx initiated, melissa. well, assessment on-going, pt. now planned admit, utd on current poc, with understanding verbalized
admit and s/o received, awaiting bed assignment
back from CT, nad or change, assessment on-going, pending results/further orders.  Pt. ambulated to restroom with steady gait
nad or change, still awaiting GI recs
p.o, contrast completed, pt. Dr. Sandra pérez notified, orders received/implemented as noted, assessment on-going
pt. resting lt recumbent, states nausea has improved following Reglan, updated on current labs, with understanding verbalized, assessment on-going
pt. with active emesis upon presentation to bed, interventions as noted, melissa. well, assessment on-going, awaiting provider eval, pt. utd on current poc, with understanding verbalized
additional meds as noted, melissa. well, with p.o. contrast initiated, assessment on-going

## 2022-05-20 NOTE — H&P ADULT - NSICDXFAMILYHX_GEN_ALL_CORE_FT
FAMILY HISTORY:  No pertinent family history in first degree relatives     FAMILY HISTORY:  Mother  Still living? Yes, Estimated age: Age Unknown  FH: type 2 diabetes, Age at diagnosis: Age Unknown

## 2022-05-20 NOTE — H&P ADULT - PROBLEM SELECTOR PLAN 4
F: s/p 1L NS   E: replete as needed   N: NPO  GI ppx: S/P Famotidine in the ED. c/w   DVT ppx: Ambulatory   Dispo: RMF   CODE: FULL F: s/p 1L NS   E: replete as needed   N: NPO  GI ppx: S/P Famotidine in the ED. c/w famotidine  DVT ppx: Ambulatory   Dispo: RMF   CODE: FULL F: s/p 1L NS   E: replete as needed   N: NPO  GI ppx: S/P Famotidine in the ED. c/w famotidine  DVT ppx: Ambulatory , SCD   Dispo: RMF   CODE: FULL

## 2022-05-20 NOTE — H&P ADULT - PROBLEM SELECTOR PLAN 3
On Albuterol at home  c/w Albuterol PRN reports childhood asthma which resolved and not on any inhaler at home

## 2022-05-20 NOTE — CONSULT NOTE ADULT - ASSESSMENT
31F with a h/o UC diagnosed almost 3 years ago on mesalamine, followed by Dr. Snow, who p/w lower right side abdominal pain, nausea, and watery, nonbloody vomits and diarrhea since yesterday, associated with fevers.    #Nausea/vomiting   #Dark stools  Patient presenting with symptoms initially of nausea starting Wednesday, progressing to nausea/vomiting and several episodes of dark stools. Also noting concurrent b/l knee pain, lumbar pain for which she had taken NSAIDs at home. Reports history of UC, diagnosed on 2020 colonoscopy evaluation which had revealed e/o acute and chronic inflammation with crypt abscess formation however with preservation of crypt architecture. Per record review, had been on Lialda (PO Mesalamine, 4.8 daily), Rowasa (Mesalamine enemas PRN), and Budesonide for the tx of this, follows with Dr Irvin Snow (GI) outpatient. At one point had been considered for initiation of Entyvio however never started biologic tx. 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 Repeat colonoscopy evaluation (as noted below) in 2021 revealing normal mucosa, endoscopically and histologically. CRP elevated @ 21.6 on this admission with CT A/P revealing inflammation of ascending and transverse colon, not typical pattern of distribution seen with UC. Unclear if with true underlying diagnosis of UC or if previous strongyloides infection resulted in abnormal histologic findings on 2020 colonoscopy or if current presentation related solely to infectious colitis (GI PCR + norovirus)  - Cdiff negative, GI PCR + norovirus  - Obtain colonoscopy reports from 2020 from Dr Snow' office (not available on [place]/[employer])  - Colonoscopy (2/17/21; Dr Irvin Snow): grossly normal mucosa throughout (path, random biopsies of right colon, transverse, descending and sigmoid colon: Colonic mucosa with preserved crypt architecture. No acute or chronic epithelial damage is identified)  - Discontinue steroids  - No need for abxs at this time  - No NSAIDs in the setting of suspected flare  - Keep NPO in preparation for tentative EGD today  - Will defer on colonoscopy elevation pending resolution of norovirus infection  - Remainder of supportive care as per primary team    Case discussed with [facility] attending and primary team.     Christina Stahl DO  Gastroenterology Fellow  Pager: [phone] 31F with a h/o UC diagnosed almost 3 years ago on mesalamine, followed by Dr. Snow, who p/w lower right side abdominal pain, nausea, and watery, nonbloody vomits and diarrhea since yesterday, associated with fevers.    #Nausea/vomiting   #Dark stools  Patient presenting with symptoms initially of nausea starting Wednesday, progressing to nausea/vomiting and several episodes of dark stools. Also noting concurrent b/l knee pain, lumbar pain for which she had taken NSAIDs at home. Reports history of UC, diagnosed on 2020 colonoscopy evaluation which had revealed e/o acute and chronic inflammation with crypt abscess formation however with preservation of crypt architecture. Per record review, had been on Lialda (PO Mesalamine, 4.8 daily), Rowasa (Mesalamine enemas PRN), and Budesonide for the tx of this, follows with Dr Irvin Snow (GI) outpatient. At one point had been considered for initiation of Entyvio however never started biologic tx. 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 Repeat colonoscopy evaluation (as noted below) in 2021 revealing normal mucosa, endoscopically and histologically. CRP elevated @ 21.6 on this admission with CT A/P revealing inflammation of ascending and transverse colon, not typical pattern of distribution seen with UC. Unclear if with true underlying diagnosis of UC or if previous strongyloides infection resulted in abnormal histologic findings on 2020 colonoscopy or if current presentation related solely to infectious colitis (GI PCR + norovirus)  - Cdiff negative, GI PCR + norovirus  - Immunoglobulin panel negative in 2019  - Obtain colonoscopy reports from 2020 from Dr Snow' office (not available on [place]/[employer])  - Colonoscopy (2/17/21; Dr Irvin Snow): grossly normal mucosa throughout (path, random biopsies of right colon, transverse, descending and sigmoid colon: Colonic mucosa with preserved crypt architecture. No acute or chronic epithelial damage is identified)  - Discontinue steroids  - No need for abxs at this time  - No NSAIDs in the setting of suspected flare  - Keep NPO in preparation for tentative EGD today  - Will defer on colonoscopy elevation pending resolution of norovirus infection  - Remainder of supportive care as per primary team    Case discussed with [facility] attending and primary team.     Christina Stahl DO  Gastroenterology Fellow  Pager: [phone]

## 2022-05-20 NOTE — PATIENT PROFILE ADULT - FALL HARM RISK - UNIVERSAL INTERVENTIONS
Bed in lowest position, wheels locked, appropriate side rails in place/Call bell, personal items and telephone in reach/Instruct patient to call for assistance before getting out of bed or chair/Non-slip footwear when patient is out of bed/[place] to call system/Physically safe environment - no spills, clutter or unnecessary equipment/Purposeful Proactive Rounding/Room/bathroom lighting operational, light cord in reach

## 2022-05-20 NOTE — DISCHARGE NOTE PROVIDER - ATTENDING DISCHARGE PHYSICAL EXAMINATION:
PHYSICAL EXAM:  GENERAL: No acute distress, well-developed  HEAD:  Atraumatic, Normocephalic  ENT: EOMI, conjunctiva and sclera clear, Neck supple, No JVD, moist mucosa  CHEST/LUNG: Clear to auscultation bilaterally; No wheeze, equal breath sounds bilaterally   BACK: No spinal tenderness  HEART: Regular rate and rhythm; No murmurs, rubs, or gallops, radial and DP 2+ b/l, euvolemic  ABDOMEN: Soft, RUQ tenderness to deep palpation, Nondistended, hypoactive BS  EXTREMITIES:  No clubbing, cyanosis, or edema  PSYCH: Nl behavior, nl affect  NEUROLOGY: AAOx3, non-focal  SKIN: Normal color, No rashes or lesions  LINES: no central lines present

## 2022-05-20 NOTE — DISCHARGE NOTE PROVIDER - HOSPITAL COURSE
#Discharge: do not delete  31F with a h/o UC on mesalamine, followed by Dr. Snow, who p/w lower abdominal pain, nausea, and watery, melena and nbnb vomiting, associated with fevers admitted to the [facility], found to have + norovirus. Patient had an EGD done for the melena showing ________.   Problem List/Main Diagnoses (system-based):   #Melena  Patient arrived with 4x explosive melena  diarrhea likely 2/2 to norovirus which pt was found to be positive for  melena work up done by GI EGD   EGD done by GI showing:  -GI recs:     #Ulcerative colitis  patient with colitis seen on imaging, but presentation more painful than usual admissions likely 2/2  norovirus  -f/u with Dr Snow    #N/V abdominal pain  patient presents with N/V abdominal pain 2/2 norovirus  patient received fluids for supportive care    Inpatient treatment course: as above  New medications:      #Discharge: do not delete  31F with a h/o UC on mesalamine, followed by Dr. Snow, who p/w lower abdominal pain, nausea, and watery, melena and nbnb vomiting, associated with fevers admitted to the [facility], found to have + norovirus.    Problem List/Main Diagnoses (system-based):   #Melena  Patient arrived with 4x explosive melena  diarrhea likely 2/2 to norovirus which pt was found to be positive for  melena work up done by GI EGD showed non-erosive gastritis, biopsies taken  -GI recs: no NSAIDS  -c/w pepcid daily  -f/u Dr Snow    #Norovirus  Found to be Norovirus positive as cause of diarrhea and nausea. Able to tolerate PO and safe for DC home.   -avoid dairy  -zofran PRN for nausea  -tylenol PRN for mild pain  -tramadol PRN for severe pain  -no work on 5/22/22    #Ulcerative colitis  patient with colitis seen on imaging, but presentation more painful than usual admissions likely 2/2  norovirus. GI evaluated patient here and does not believe she actually has UC.  -f/u with Dr Snow  -DC mesalamine    New medications: pepcid qd, zofran PRN, tramadol PRN  Stop: mesalamine    Labs to follow up: EGD biopsy

## 2022-05-20 NOTE — PATIENT PROFILE ADULT - PATIENT'S PREFERRED PRONOUN
For information on Fall & Injury Prevention, visit [email or web address] Her/She

## 2022-05-20 NOTE — DISCHARGE NOTE PROVIDER - CARE PROVIDER_API CALL
Irvin Snow)  Gastroenterology  [address] [facility], [address] [place] [address], [place] [address]  Phone: [phone]  Fax: [phone]  Established Patient  Follow Up Time: 2 weeks

## 2022-05-20 NOTE — CONSULT NOTE ADULT - SUBJECTIVE AND OBJECTIVE BOX
HPI:  31F with a h/o UC diagnosed almost 3 years ago on mesalamine, followed by Dr. Snow, who p/w lower right side abdominal pain, nausea, and watery, nonbloody vomits and diarrhea since yesterday, associated with fevers. She reported her Tmax at home 101 yesterday.  She has been taking ibuprofen with minimal relief (has been throwing it up). She started having abdominal cramp, and nausea yesterday morning and after her lunch (chicken, carrot and potato from restaurant that they always order) she started having black watery mucus diarrhea with fever and N/V. Her fiance had the same food but no symptoms for food poisoning. The last meal she had last night, she vomited and called her PCP and Dr. Snow, they recommended come to the ED. The main reason she came to the ED was abdominal cramp, and body pain did nor relieved with Ibuprofen. She was prescribed Oxycodone before for pain at home, but she never took it since she scares of narcotics.   She also reports childhood asthma, which resolved and the only time that she used inhaler was last year when she was diagnosed with COVID and she needed for SOB.  During interview, she had intermittent abdominal cramp and body pain, and used bathroom several times, but not diarrhea this time, just small hard stool with no blood.   She denies HA, dizziness, SOB, CP, urinary symptoms/changes. She reports compliant with mesalamine         ED:  VS: Tmax: 99, HR 78, /74, RR 17/99% RA  Lab: CBc normal. K 3.6. lactate 1.3, CRP 21.6    Treatment:  Ciprofloxacine 400mg IV and Metronidazole 500mg IV , zofran, reglan , 2mg Morphine, 1 L NS  (20 May 2022 00:53)    Allergies    No Known Allergies    Intolerances      MEDICATIONS:  MEDICATIONS  (STANDING):  famotidine    Tablet 20 milliGRAM(s) Oral every 24 hours    MEDICATIONS  (PRN):  ondansetron Injectable 4 milliGRAM(s) IV Push three times a day PRN Nausea and/or Vomiting    PAST MEDICAL & SURGICAL HISTORY:  Asthma      Ulcerative colitis with complication, unspecified location      No significant past surgical history        FAMILY HISTORY:  FH: type 2 diabetes (Mother)      SOCIAL HISTORY:  [place]: [ ] Current, [ ] Former, [ ] Never; Pack Years:  Alcohol:  Illicit Drugs:    REVIEW OF SYSTEMS:  Constitutional: No fever, chills, weight loss, or fatigue  ENMT:  No visual changes; No difficulty hearing, tinnitus, vertigo; No sinus or throat pain  Neck: No pain or stiffness  Respiratory: No cough, wheezing, or hemoptysis; No shortness of breath  Cardiovascular: No chest pain, palpitations, dizziness, orthopnea, PND, or leg swelling  Gastrointestinal: No abdominal or epigastric pain. No  nausea, vomiting, or hematemesis. No diarrhea, constipation, or steatorrhea. No melena or hematochezia  Genitourinary: No dysuria, urinary frequency/hesitancy, or hematuria  Skin: No itching, burning, rashes or lesions   Musculoskeletal: No joint pain or swelling; No muscle, back or extremity pain    Vital Signs Last 24 Hrs  T(C): 36.9 (20 May 2022 04:00), Max: 37.3 (19 May 2022 20:31)  T(F): 98.4 (20 May 2022 04:00), Max: 99.2 (19 May 2022 20:31)  HR: 77 (20 May 2022 04:00) (77 - 92)  BP: 112/75 (20 May 2022 04:00) (112/75 - 127/80)  BP(mean): --  RR: 16 (20 May 2022 04:00) (16 - 17)  SpO2: 96% (20 May 2022 04:00) (96% - 99%)    05-19 @ 07:01  -  05-20 @ 07:00  --------------------------------------------------------  IN: 1850 mL / OUT: 450 mL / NET: 1400 mL        PHYSICAL EXAM:    General: Well developed; well nourished; in no acute distress  HEENT: MMM, conjunctiva and sclera clear  Gastrointestinal: Soft, non-tender non-distended; Normal bowel sounds; No rebound or guarding  Extremities: Normal range of motion, No clubbing, cyanosis or edema  Neurological: Alert and oriented x3  Skin: Warm and dry. No obvious rash    LABS:                        12.7   2.62  )-----------( 181      ( 20 May 2022 07:13 )             39.1     05-20    136  |  107  |  6<L>  ----------------------------<  113<H>  4.1   |  20<L>  |  0.57    Ca    8.2<L>      20 May 2022 07:13  Phos  2.7     05-20  Mg     2.1     05-20    TPro  6.3  /  Alb  3.6  /  TBili  0.4  /  DBili  x   /  AST  27  /  ALT  20  /  AlkPhos  55  05-20        RADIOLOGY & ADDITIONAL STUDIES:    HPI:  31F with a h/o UC diagnosed almost 3 years ago on mesalamine, followed by Dr. Snow, who p/w lower right side abdominal pain, nausea, and watery, nonbloody vomits and diarrhea since yesterday, associated with fevers. She reported her Tmax at home 101 yesterday.  She has been taking ibuprofen with minimal relief (has been throwing it up). She started having abdominal cramp, and nausea yesterday morning and after her lunch (chicken, carrot and potato from restaurant that they always order) she started having black watery mucus diarrhea with fever and N/V. Her fiance had the same food but no symptoms for food poisoning. The last meal she had last night, she vomited and called her PCP and Dr. Snow, they recommended come to the ED. The main reason she came to the ED was abdominal cramp, and body pain did nor relieved with Ibuprofen. She was prescribed Oxycodone before for pain at home, but she never took it since she scares of narcotics.   She also reports childhood asthma, which resolved and the only time that she used inhaler was last year when she was diagnosed with COVID and she needed for SOB.  During interview, she had intermittent abdominal cramp and body pain, and used bathroom several times, but not diarrhea this time, just small hard stool with no blood.   She denies HA, dizziness, SOB, CP, urinary symptoms/changes. She reports compliant with mesalamine         ED:  VS: Tmax: 99, HR 78, /74, RR 17/99% RA  Lab: CBc normal. K 3.6. lactate 1.3, CRP 21.6    Treatment:  Ciprofloxacine 400mg IV and Metronidazole 500mg IV , zofran, reglan , 2mg Morphine, 1 L NS  (20 May 2022 00:53)    GI consulted for nausea/vomiting and black stools and concern for underlying IBD flare.     Allergies    No Known Allergies    Intolerances      MEDICATIONS:  MEDICATIONS  (STANDING):  famotidine    Tablet 20 milliGRAM(s) Oral every 24 hours    MEDICATIONS  (PRN):  ondansetron Injectable 4 milliGRAM(s) IV Push three times a day PRN Nausea and/or Vomiting    PAST MEDICAL & SURGICAL HISTORY:  Asthma      Ulcerative colitis with complication, unspecified location      No significant past surgical history        FAMILY HISTORY:  FH: type 2 diabetes (Mother)      SOCIAL HISTORY:  [place]: [ ] Current, [ ] Former, [ ] Never; Pack Years:  Alcohol:  Illicit Drugs:    REVIEW OF SYSTEMS:  Constitutional: No fever, chills, weight loss, or fatigue  ENMT:  No visual changes; No difficulty hearing, tinnitus, vertigo; No sinus or throat pain  Neck: No pain or stiffness  Respiratory: No cough, wheezing, or hemoptysis; No shortness of breath  Cardiovascular: No chest pain, palpitations, dizziness, orthopnea, PND, or leg swelling  Gastrointestinal: No abdominal or epigastric pain. No  nausea, vomiting, or hematemesis. No diarrhea, constipation, or steatorrhea. No melena or hematochezia  Genitourinary: No dysuria, urinary frequency/hesitancy, or hematuria  Skin: No itching, burning, rashes or lesions   Musculoskeletal: No joint pain or swelling; No muscle, back or extremity pain    Vital Signs Last 24 Hrs  T(C): 36.9 (20 May 2022 04:00), Max: 37.3 (19 May 2022 20:31)  T(F): 98.4 (20 May 2022 04:00), Max: 99.2 (19 May 2022 20:31)  HR: 77 (20 May 2022 04:00) (77 - 92)  BP: 112/75 (20 May 2022 04:00) (112/75 - 127/80)  BP(mean): --  RR: 16 (20 May 2022 04:00) (16 - 17)  SpO2: 96% (20 May 2022 04:00) (96% - 99%)    05-19 @ 07:01  -  05-20 @ 07:00  --------------------------------------------------------  IN: 1850 mL / OUT: 450 mL / NET: 1400 mL        PHYSICAL EXAM:    General: Young female; lying in bed; in no acute distress  HEENT: MMM, conjunctiva and sclera clear  Gastrointestinal: Soft, non-distended; tenderness to palpation on right side of abdomen throughout; Normal bowel sounds; No rebound or guarding  Extremities: Normal range of motion, No clubbing, cyanosis or edema  Neurological: Alert and oriented x3  Skin: Warm and dry. No obvious rash    LABS:                        12.7   2.62  )-----------( 181      ( 20 May 2022 07:13 )             39.1     05-20    136  |  107  |  6<L>  ----------------------------<  113<H>  4.1   |  20<L>  |  0.57    Ca    8.2<L>      20 May 2022 07:13  Phos  2.7     05-20  Mg     2.1     05-20    TPro  6.3  /  Alb  3.6  /  TBili  0.4  /  DBili  x   /  AST  27  /  ALT  20  /  AlkPhos  55  05-20        RADIOLOGY & ADDITIONAL STUDIES:

## 2022-05-21 ENCOUNTER — TRANSCRIPTION ENCOUNTER (OUTPATIENT)
Age: 32
End: 2022-05-21

## 2022-05-21 VITALS
OXYGEN SATURATION: 96 % | RESPIRATION RATE: 18 BRPM | TEMPERATURE: 99 F | SYSTOLIC BLOOD PRESSURE: 98 MMHG | HEART RATE: 79 BPM | DIASTOLIC BLOOD PRESSURE: 64 MMHG

## 2022-05-21 LAB
ANION GAP SERPL CALC-SCNC: 8 MMOL/L — SIGNIFICANT CHANGE UP (ref 5–17)
BASOPHILS # BLD AUTO: 0.02 K/UL — SIGNIFICANT CHANGE UP (ref 0–0.2)
BASOPHILS NFR BLD AUTO: 0.7 % — SIGNIFICANT CHANGE UP (ref 0–2)
BUN SERPL-MCNC: 7 MG/DL — SIGNIFICANT CHANGE UP (ref 7–23)
CALCIUM SERPL-MCNC: 8.5 MG/DL — SIGNIFICANT CHANGE UP (ref 8.4–10.5)
CHLORIDE SERPL-SCNC: 106 MMOL/L — SIGNIFICANT CHANGE UP (ref 96–108)
CO2 SERPL-SCNC: 23 MMOL/L — SIGNIFICANT CHANGE UP (ref 22–31)
CREAT SERPL-MCNC: 0.77 MG/DL — SIGNIFICANT CHANGE UP (ref 0.5–1.3)
EGFR: 105 ML/MIN/1.73M2 — SIGNIFICANT CHANGE UP
EOSINOPHIL # BLD AUTO: 0 K/UL — SIGNIFICANT CHANGE UP (ref 0–0.5)
EOSINOPHIL NFR BLD AUTO: 0 % — SIGNIFICANT CHANGE UP (ref 0–6)
GLUCOSE SERPL-MCNC: 87 MG/DL — SIGNIFICANT CHANGE UP (ref 70–99)
HCT VFR BLD CALC: 36.8 % — SIGNIFICANT CHANGE UP (ref 34.5–45)
HGB BLD-MCNC: 12.2 G/DL — SIGNIFICANT CHANGE UP (ref 11.5–15.5)
IMM GRANULOCYTES NFR BLD AUTO: 0 % — SIGNIFICANT CHANGE UP (ref 0–1.5)
LYMPHOCYTES # BLD AUTO: 1.6 K/UL — SIGNIFICANT CHANGE UP (ref 1–3.3)
LYMPHOCYTES # BLD AUTO: 54.4 % — HIGH (ref 13–44)
MAGNESIUM SERPL-MCNC: 2 MG/DL — SIGNIFICANT CHANGE UP (ref 1.6–2.6)
MCHC RBC-ENTMCNC: 29.3 PG — SIGNIFICANT CHANGE UP (ref 27–34)
MCHC RBC-ENTMCNC: 33.2 GM/DL — SIGNIFICANT CHANGE UP (ref 32–36)
MCV RBC AUTO: 88.5 FL — SIGNIFICANT CHANGE UP (ref 80–100)
MONOCYTES # BLD AUTO: 0.3 K/UL — SIGNIFICANT CHANGE UP (ref 0–0.9)
MONOCYTES NFR BLD AUTO: 10.2 % — SIGNIFICANT CHANGE UP (ref 2–14)
NEUTROPHILS # BLD AUTO: 1.02 K/UL — LOW (ref 1.8–7.4)
NEUTROPHILS NFR BLD AUTO: 34.7 % — LOW (ref 43–77)
NRBC # BLD: 0 /100 WBCS — SIGNIFICANT CHANGE UP (ref 0–0)
PHOSPHATE SERPL-MCNC: 4.1 MG/DL — SIGNIFICANT CHANGE UP (ref 2.5–4.5)
PLATELET # BLD AUTO: 181 K/UL — SIGNIFICANT CHANGE UP (ref 150–400)
POTASSIUM SERPL-MCNC: 3.7 MMOL/L — SIGNIFICANT CHANGE UP (ref 3.5–5.3)
POTASSIUM SERPL-SCNC: 3.7 MMOL/L — SIGNIFICANT CHANGE UP (ref 3.5–5.3)
RBC # BLD: 4.16 M/UL — SIGNIFICANT CHANGE UP (ref 3.8–5.2)
RBC # FLD: 13.5 % — SIGNIFICANT CHANGE UP (ref 10.3–14.5)
SODIUM SERPL-SCNC: 137 MMOL/L — SIGNIFICANT CHANGE UP (ref 135–145)
WBC # BLD: 2.94 K/UL — LOW (ref 3.8–10.5)
WBC # FLD AUTO: 2.94 K/UL — LOW (ref 3.8–10.5)

## 2022-05-21 PROCEDURE — 87507 IADNA-DNA/RNA PROBE TQ 12-25: CPT

## 2022-05-21 PROCEDURE — 88305 TISSUE EXAM BY PATHOLOGIST: CPT

## 2022-05-21 PROCEDURE — 86140 C-REACTIVE PROTEIN: CPT

## 2022-05-21 PROCEDURE — 85027 COMPLETE CBC AUTOMATED: CPT

## 2022-05-21 PROCEDURE — 96367 TX/PROPH/DG ADDL SEQ IV INF: CPT

## 2022-05-21 PROCEDURE — 88341 IMHCHEM/IMCYTCHM EA ADD ANTB: CPT

## 2022-05-21 PROCEDURE — 87324 CLOSTRIDIUM AG IA: CPT

## 2022-05-21 PROCEDURE — 87635 SARS-COV-2 COVID-19 AMP PRB: CPT

## 2022-05-21 PROCEDURE — 80048 BASIC METABOLIC PNL TOTAL CA: CPT

## 2022-05-21 PROCEDURE — 85652 RBC SED RATE AUTOMATED: CPT

## 2022-05-21 PROCEDURE — 96365 THER/PROPH/DIAG IV INF INIT: CPT

## 2022-05-21 PROCEDURE — 80053 COMPREHEN METABOLIC PANEL: CPT

## 2022-05-21 PROCEDURE — 84100 ASSAY OF PHOSPHORUS: CPT

## 2022-05-21 PROCEDURE — 83690 ASSAY OF LIPASE: CPT

## 2022-05-21 PROCEDURE — 87449 NOS EACH ORGANISM AG IA: CPT

## 2022-05-21 PROCEDURE — 99285 EMERGENCY DEPT VISIT HI MDM: CPT | Mod: 25

## 2022-05-21 PROCEDURE — 84702 CHORIONIC GONADOTROPIN TEST: CPT

## 2022-05-21 PROCEDURE — 85025 COMPLETE CBC W/AUTO DIFF WBC: CPT

## 2022-05-21 PROCEDURE — 81003 URINALYSIS AUTO W/O SCOPE: CPT

## 2022-05-21 PROCEDURE — 36415 COLL VENOUS BLD VENIPUNCTURE: CPT

## 2022-05-21 PROCEDURE — 83605 ASSAY OF LACTIC ACID: CPT

## 2022-05-21 PROCEDURE — 74177 CT ABD & PELVIS W/CONTRAST: CPT | Mod: MA

## 2022-05-21 PROCEDURE — 83735 ASSAY OF MAGNESIUM: CPT

## 2022-05-21 PROCEDURE — 99239 HOSP IP/OBS DSCHRG MGMT >30: CPT

## 2022-05-21 PROCEDURE — 96375 TX/PRO/DX INJ NEW DRUG ADDON: CPT

## 2022-05-21 RX ORDER — ONDANSETRON 8 MG/1
1 TABLET, FILM COATED ORAL
Qty: 45 | Refills: 0
Start: 2022-05-21 | End: 2022-06-04

## 2022-05-21 RX ORDER — FAMOTIDINE 10 MG/ML
1 INJECTION INTRAVENOUS
Qty: 30 | Refills: 0
Start: 2022-05-21 | End: 2022-06-19

## 2022-05-21 RX ORDER — MESALAMINE 400 MG
2 TABLET, DELAYED RELEASE (ENTERIC COATED) ORAL
Qty: 0 | Refills: 0 | DISCHARGE

## 2022-05-21 RX ORDER — TRAMADOL HYDROCHLORIDE 50 MG/1
0.5 TABLET ORAL
Qty: 2 | Refills: 0
Start: 2022-05-21 | End: 2022-05-24

## 2022-05-21 RX ADMIN — FAMOTIDINE 20 MILLIGRAM(S): 10 INJECTION INTRAVENOUS at 11:26

## 2022-05-21 NOTE — PROGRESS NOTE ADULT - PROBLEM SELECTOR PLAN 4
F: none  E: replete as needed   N: uptitrate as tolerated  GI ppx: S/P Famotidine in the ED. c/w famotidine  DVT ppx: Ambulatory , SCD   Dispo: F   CODE: FULL

## 2022-05-21 NOTE — DISCHARGE NOTE NURSING/CASE MANAGEMENT/SOCIAL WORK - NSDCPEFALRISK_GEN_ALL_CORE
For information on Fall & Injury Prevention, visit: [email or web address] OR  [email or web address] OR  https://www.cdc.gov/steadi/patient.html

## 2022-05-21 NOTE — PROGRESS NOTE ADULT - PROBLEM SELECTOR PLAN 2
low suspicion for UC flare; mild colitis in the presence of norovirus  -c/w holding mesalamine  -pt will need to f/u w/ outpatient gastroenterologist  -appreciate GI recs

## 2022-05-21 NOTE — PROGRESS NOTE ADULT - PROBLEM SELECTOR PLAN 1
CT abdomen: Mild pancolitis consistent with h/o UC; remains afebrile; norovirus+  -c/w Zofran prn  -supportive care  -advance diet as tolerated

## 2022-05-21 NOTE — DISCHARGE NOTE NURSING/CASE MANAGEMENT/SOCIAL WORK - PATIENT PORTAL LINK FT
You can access the FollowMyHealth Patient Portal offered by [facility] by registering at the following website: [email or web address]. By joining [facility]’s FollowMyHealth portal, you will also be able to view your health information using other applications (apps) compatible with our system.

## 2022-05-21 NOTE — PROGRESS NOTE ADULT - SUBJECTIVE AND OBJECTIVE BOX
GASTROENTEROLOGY PROGRESS NOTE  Patient seen and examined at bedside. EGD () - Normal esophagus. Normal duodenum. Erythema in the antrum compatible with non-erosive gastritis. (Biopsy).    ROS: Patient reports still some right sided abdominal pain, no appetite but eager to try to eat something of more regular consistency. 1-2 episodes of diarrhea since yesterday, green-brown in color now.     PERTINENT REVIEW OF SYSTEMS:  CONSTITUTIONAL: No weakness, fevers or chills  HEENT: No visual changes; No vertigo or throat pain   GASTROINTESTINAL: As above.  NEUROLOGICAL: No numbness or weakness  SKIN: No itching, burning, rashes, or lesions     Allergies    No Known Allergies    Intolerances      MEDICATIONS:  MEDICATIONS  (STANDING):  famotidine    Tablet 20 milliGRAM(s) Oral every 24 hours  sodium chloride 0.9%. 1000 milliLiter(s) (75 mL/Hr) IV Continuous <Continuous>    MEDICATIONS  (PRN):  ondansetron Injectable 4 milliGRAM(s) IV Push three times a day PRN Nausea and/or Vomiting    Vital Signs Last 24 Hrs  T(C): 37.4 (21 May 2022 06:10), Max: 37.4 (21 May 2022 06:10)  T(F): 99.3 (21 May 2022 06:10), Max: 99.3 (21 May 2022 06:10)  HR: 66 (21 May 2022 06:10) (66 - 78)  BP: 101/67 (21 May 2022 06:10) (101/62 - 116/78)  BP(mean): --  RR: 17 (21 May 2022 06:10) (17 - 18)  SpO2: 98% (21 May 2022 06:10) (96% - 98%)    PHYSICAL EXAM:    General: Young female; lying in bed; in no acute distress  HEENT: MMM, conjunctiva and sclera clear  Gastrointestinal: Soft, non-distended; tenderness to palpation on right side of abdomen throughout; Normal bowel sounds; No rebound or guarding  Extremities: Normal range of motion, No clubbing, cyanosis or edema  Neurological: Alert and oriented x3  Skin: Warm and dry. No obvious rash    LABS:                        12.2   2.94  )-----------( 181      ( 21 May 2022 07:33 )             36.8     05-21    137  |  106  |  7   ----------------------------<  87  3.7   |  23  |  0.77    Ca    8.5      21 May 2022 07:33  Phos  4.1     05-  Mg     2.0     -    TPro  6.3  /  Alb  3.6  /  TBili  0.4  /  DBili  x   /  AST  27  /  ALT  20  /  AlkPhos  55  05-          Urinalysis Basic - ( 19 May 2022 20:57 )    Color: Yellow / Appearance: Clear / S.020 / pH: x  Gluc: x / Ketone: Trace mg/dL  / Bili: Negative / Urobili: 2.0 E.U./dL   Blood: x / Protein: NEGATIVE mg/dL / Nitrite: NEGATIVE   Leuk Esterase: NEGATIVE / RBC: x / WBC x   Sq Epi: x / Non Sq Epi: x / Bacteria: x        GI PCR Panel, Stool (collected 20 May 2022 08:13)  Source: .Stool None  Final Report (20 May 2022 11:16):    Norovirus GI/GII    DETECTED by PCR    *******Please Note:*******    GI panel PCR evaluates for:    Campylobacter, Plesiomonas shigelloides, Salmonella,    Vibrio, Yersinia enterocolitica, Enteroaggregative    Escherichia coli (EAEC), Enteropathogenic E.coli (EPEC),    Enterotoxigenic E. coli (ETEC) lt/st, Shiga-like    toxin-producing E. coli (STEC) stx1/stx2,    Shigella/ Enteroinvasive E. coli (EIEC), Cryptosporidium,    Cyclospora cayetanensis, Entamoeba histolytica,    Giardia lamblia, Adenovirus F 40/41, Astrovirus,    Norovirus GI/GII, Rotavirus A, Sapovirus    Urinalysis with Rflx Culture (collected 19 May 2022 20:57)      RADIOLOGY & ADDITIONAL STUDIES:  Reviewed

## 2022-05-21 NOTE — PROGRESS NOTE ADULT - ASSESSMENT
31F with a h/o UC diagnosed almost 3 years ago on mesalamine, followed by Dr. Snow, who p/w lower right side abdominal pain, nausea, and watery, nonbloody vomits and diarrhea since yesterday, associated with fevers.    #Nausea/vomiting   #Dark stools  Patient presenting with symptoms initially of nausea starting Wednesday, progressing to nausea/vomiting and several episodes of dark stools. Also noting concurrent b/l knee pain, lumbar pain for which she had taken NSAIDs at home. Reports history of UC, diagnosed on 2020 colonoscopy evaluation which had revealed e/o acute and chronic inflammation with crypt abscess formation however with preservation of crypt architecture. Per record review, had been on Lialda (PO Mesalamine, 4.8 daily), Rowasa (Mesalamine enemas PRN), and Budesonide for the tx of this, follows with Dr Irvin Snow (GI) outpatient. At one point had been considered for initiation of Entyvio however never started biologic tx. 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 Repeat colonoscopy evaluation (as noted below) in 2021 revealing normal mucosa, endoscopically and histologically. CRP elevated @ 21.6 on this admission with CT A/P revealing inflammation of ascending and transverse colon, not typical pattern of distribution seen with UC. Unclear if with true underlying diagnosis of UC or if previous strongyloides infection resulted in abnormal histologic findings on 2020 colonoscopy or if current presentation related solely to infectious colitis (GI PCR + norovirus)  - Cdiff negative, GI PCR + norovirus  - Immunoglobulin panel negative in 2019  - Obtain colonoscopy reports from 2020 from Dr Snow' office (not available on [employer])  - Colonoscopy (2/17/21; Dr Irvin Snow): grossly normal mucosa throughout (path, random biopsies of right colon, transverse, descending and sigmoid colon: Colonic mucosa with preserved crypt architecture. No acute or chronic epithelial damage is identified)  - EGD (5/20) - Normal esophagus. Normal duodenum. Erythema in the antrum compatible with non-erosive gastritis. (Biopsy).  - Will f/u pathology  - No need for abxs or steroids at this time  - Hold home mesalamine  - No NSAIDs in the setting of suspected flare  - Will defer on colonoscopy elevation pending resolution of norovirus infection  - Advance diet as tolerated   - Remainder of supportive care as per primary team    Case discussed with [facility] attending and primary team.     Christina Stahl DO  Gastroenterology Fellow  Pager: [phone]
31F with a h/o UC on mesalamine, followed by Dr. Snow, who p/w lower abdominal pain, nausea, and watery, nonbloody vomits and diarrhea since yesterday, associated with fevers admitted to the [facility] for UC flare up management.

## 2022-05-21 NOTE — PROGRESS NOTE ADULT - SUBJECTIVE AND OBJECTIVE BOX
INTERVAL EVENTS: No o/n events. Reports occasional nausea and ab pain. Denies CP, dyspnea, palpitations, presyncope, syncope, f/c/n/v.     REVIEW OF SYSTEMS:  Constitutional:     [X] negative [ ] fevers [ ] chills [ ] weight loss [ ] weight gain  HEENT:                  [X] negative [ ] dry eyes [ ] eye irritation [ ] postnasal drip [ ] nasal congestion  CV:                         [X] negative  [ ] chest pain [ ] orthopnea [ ] palpitations [ ] murmur  Resp:                     [X] negative [ ] cough [ ] shortness of breath [ ] wheezing [ ] sputum [ ] hemoptysis  GI:                          [ ] negative [X] nausea [X] vomiting [ ] diarrhea [ ] constipation [X] abd pain [ ] dysphagia   :                        [X] negative [ ] dysuria [ ] nocturia [ ] hematuria [ ] increased urinary frequency  MSK:                      [X] negative [ ] back pain [ ] myalgias [ ] arthralgias [ ] fracture  Skin:                       [X] negative [ ] rash [ ] itch  Neuro:                   [X] negative [ ] headache [ ] dizziness [ ] syncope [ ] weakness [ ] numbness  Psych:                    [X] negative [ ] anxiety [ ] depression  Endo:                     [X] negative [ ] diabetes [ ] thyroid problem  Heme/Lymph:      [X] negative [ ] anemia [ ] bleeding problem  Allergic/Immune: [X] negative [ ] itchy eyes [ ] nasal discharge [ ] hives [ ] angioedema    [X] All other systems negative or otherwise described above.  [ ] Unable to assess ROS because ________.    PAST MEDICAL & SURGICAL HISTORY:  No pertinent past medical history    Asthma    Ulcerative colitis with complication, unspecified location    No significant past surgical history      MEDICATIONS  (STANDING):  famotidine    Tablet 20 milliGRAM(s) Oral every 24 hours  sodium chloride 0.9%. 1000 milliLiter(s) (75 mL/Hr) IV Continuous <Continuous>    MEDICATIONS  (PRN):  ondansetron Injectable 4 milliGRAM(s) IV Push three times a day PRN Nausea and/or Vomiting    ICU Vital Signs Last 24 Hrs  T(C): 37.1 (21 May 2022 12:54), Max: 37.4 (21 May 2022 06:10)  T(F): 98.7 (21 May 2022 12:54), Max: 99.3 (21 May 2022 06:10)  HR: 79 (21 May 2022 12:54) (66 - 79)  BP: 98/64 (21 May 2022 12:54) (98/64 - 101/67)  BP(mean): --  ABP: --  ABP(mean): --  RR: 18 (21 May 2022 12:54) (17 - 18)  SpO2: 96% (21 May 2022 12:54) (96% - 98%)    Orthostatic VS    Daily     Daily   I&O's Summary      PHYSICAL EXAM:  GENERAL: No acute distress, well-developed  HEAD:  Atraumatic, Normocephalic  ENT: EOMI, conjunctiva and sclera clear, Neck supple, No JVD, moist mucosa  CHEST/LUNG: Clear to auscultation bilaterally; No wheeze, equal breath sounds bilaterally   BACK: No spinal tenderness  HEART: Regular rate and rhythm; No murmurs, rubs, or gallops, radial and DP 2+ b/l, euvolemic  ABDOMEN: Soft, RUQ TTP, Nondistended, hypoactive BS  EXTREMITIES:  No clubbing, cyanosis, or edema  PSYCH: Nl behavior, nl affect  NEUROLOGY: AAOx3, non-focal  SKIN: Normal color, No rashes or lesions  LINES: no central lines present     LABS:                        12.2   2.94  )-----------( 181      ( 21 May 2022 07:33 )             36.8       05-    137  |  106  |  7   ----------------------------<  87  3.7   |  23  |  0.77    Ca    8.5      21 May 2022 07:33  Phos  4.1     05-  Mg     2.0     -    TPro  6.3  /  Alb  3.6  /  TBili  0.4  /  DBili  x   /  AST  27  /  ALT  20  /  AlkPhos  55  05-20        Lipid Profile: Lipid Profile (20 @ 06:11)    Total Cholesterol/HDL Ratio Measurement: 2.2 RATIO    Cholesterol, Serum: 130 mg/dL    Triglycerides, Serum: 100 mg/dL    HDL Cholesterol, Serum: 60: HDL Levels >/= 60 mg/dL are considered beneficial and a "negative" risk  factor.  Effective 08/15/2018: New reference range and interpretive comment. mg/dL    Direct LDL: 50: LDL Cholesterol (mg/dL) --- Interpretive Comment (for adults 18 and over)  Optimal LDL Level may vary based on clinical situation  Below 70                   Ideal for people at very high risk of heart  disease  Below 100                  Ideal for people at risk of heart disease  100 - 129                    [place]  130 - 159                    Borderline high  160 - 189                    High  190 and Above           Very high mg/dL        HgA1c:   TSH:   Urinalysis Basic - ( 19 May 2022 20:57 )    Color: Yellow / Appearance: Clear / S.020 / pH: x  Gluc: x / Ketone: Trace mg/dL  / Bili: Negative / Urobili: 2.0 E.U./dL   Blood: x / Protein: NEGATIVE mg/dL / Nitrite: NEGATIVE   Leuk Esterase: NEGATIVE / RBC: x / WBC x   Sq Epi: x / Non Sq Epi: x / Bacteria: x        GI PCR Panel, Stool (collected 20 May 2022 08:13)  Source: .Stool None  Final Report (20 May 2022 11:16):    Norovirus GI/GII    DETECTED by PCR    *******Please Note:*******    GI panel PCR evaluates for:    Campylobacter, Plesiomonas shigelloides, Salmonella,    Vibrio, Yersinia enterocolitica, Enteroaggregative    Escherichia coli (EAEC), Enteropathogenic E.coli (EPEC),    Enterotoxigenic E. coli (ETEC) lt/st, Shiga-like    toxin-producing E. coli (STEC) stx1/stx2,    Shigella/ Enteroinvasive E. coli (EIEC), Cryptosporidium,    Cyclospora cayetanensis, Entamoeba histolytica,    Giardia lamblia, Adenovirus F 40/41, Astrovirus,    Norovirus GI/GII, Rotavirus A, Sapovirus    Urinalysis with Rflx Culture (collected 19 May 2022 20:57)        RADIOLOGY & ADDITIONAL STUDIES:    Other misc imaging:   < from: CT Abdomen and Pelvis w/ Oral Cont and w/ IV Cont (22 @ 22:17) >  FINDINGS:  LOWER CHEST: Within normal limits.    LIVER: Within normal limits.  BILE DUCTS: Normal caliber.  GALLBLADDER: Within normal limits.  SPLEEN: Within normal limits.  PANCREAS: Within normal limits.  ADRENALS: Within normal limits.  KIDNEYS/URETERS: Within normal limits.    BLADDER: Within normal limits.  REPRODUCTIVE ORGANS: Uterus and adnexa within normal limits. Vaginal   contraceptive in place.    BOWEL: There is mild colonic wall thickening and slightly increased   mucosal enhancement, more conspicuous along the ascending and transverse   colon. No bowel obstruction. No free air. Appendix is normal.  PERITONEUM: No ascites.  VESSELS: Within normal limits.  RETROPERITONEUM/LYMPH NODES: No lymphadenopathy.  ABDOMINAL WALL: Within normal limits.  BONES: Within normal limits.    IMPRESSION:  Mild colitis.    --- End of Report ---    < end of copied text >    < from: CT Abdomen and Pelvis w/ Oral Cont and w/ IV Cont (21 @ 14:50) >  IMPRESSION:  No bowel obstruction.    Tiny focus of gas in the nondependent portion of the bladder. Correlation with any history of recent instrumentation is suggested. In the absence of recent instrumentation, infection or fistula to bowel is considered..    < end of copied text >         INTERVAL EVENTS: No o/n events. Reports occasional nausea and ab pain. Denies CP, dyspnea, palpitations, presyncope, syncope, f/c/n/v.     REVIEW OF SYSTEMS:  Constitutional:     [X] negative [ ] fevers [ ] chills [ ] weight loss [ ] weight gain  HEENT:                  [X] negative [ ] dry eyes [ ] eye irritation [ ] postnasal drip [ ] nasal congestion  CV:                         [X] negative  [ ] chest pain [ ] orthopnea [ ] palpitations [ ] murmur  Resp:                     [X] negative [ ] cough [ ] shortness of breath [ ] wheezing [ ] sputum [ ] hemoptysis  GI:                          [ ] negative [X] nausea [X] vomiting [ ] diarrhea [ ] constipation [X] abd pain [ ] dysphagia   :                        [X] negative [ ] dysuria [ ] nocturia [ ] hematuria [ ] increased urinary frequency  MSK:                      [X] negative [ ] back pain [ ] myalgias [ ] arthralgias [ ] fracture  Skin:                       [X] negative [ ] rash [ ] itch  Neuro:                   [X] negative [ ] headache [ ] dizziness [ ] syncope [ ] weakness [ ] numbness  Psych:                    [X] negative [ ] anxiety [ ] depression  Endo:                     [X] negative [ ] diabetes [ ] thyroid problem  Heme/Lymph:      [X] negative [ ] anemia [ ] bleeding problem  Allergic/Immune: [X] negative [ ] itchy eyes [ ] nasal discharge [ ] hives [ ] angioedema    [X] All other systems negative or otherwise described above.  [ ] Unable to assess ROS because ________.    PAST MEDICAL & SURGICAL HISTORY:  No pertinent past medical history    Asthma    Ulcerative colitis with complication, unspecified location    No significant past surgical history      MEDICATIONS  (STANDING):  famotidine    Tablet 20 milliGRAM(s) Oral every 24 hours  sodium chloride 0.9%. 1000 milliLiter(s) (75 mL/Hr) IV Continuous <Continuous>    MEDICATIONS  (PRN):  ondansetron Injectable 4 milliGRAM(s) IV Push three times a day PRN Nausea and/or Vomiting    ICU Vital Signs Last 24 Hrs  T(C): 37.1 (21 May 2022 12:54), Max: 37.4 (21 May 2022 06:10)  T(F): 98.7 (21 May 2022 12:54), Max: 99.3 (21 May 2022 06:10)  HR: 79 (21 May 2022 12:54) (66 - 79)  BP: 98/64 (21 May 2022 12:54) (98/64 - 101/67)  BP(mean): --  ABP: --  ABP(mean): --  RR: 18 (21 May 2022 12:54) (17 - 18)  SpO2: 96% (21 May 2022 12:54) (96% - 98%)    Orthostatic VS    Daily     Daily   I&O's Summary      PHYSICAL EXAM:  GENERAL: No acute distress, well-developed  HEAD:  Atraumatic, Normocephalic  ENT: EOMI, conjunctiva and sclera clear, Neck supple, No JVD, moist mucosa  CHEST/LUNG: Clear to auscultation bilaterally; No wheeze, equal breath sounds bilaterally   BACK: No spinal tenderness  HEART: Regular rate and rhythm; No murmurs, rubs, or gallops, radial and DP 2+ b/l, euvolemic  ABDOMEN: Soft, RUQ tenderness to deep palpation, Nondistended, hypoactive BS  EXTREMITIES:  No clubbing, cyanosis, or edema  PSYCH: Nl behavior, nl affect  NEUROLOGY: AAOx3, non-focal  SKIN: Normal color, No rashes or lesions  LINES: no central lines present     LABS:                        12.2   2.94  )-----------( 181      ( 21 May 2022 07:33 )             36.8       05-    137  |  106  |  7   ----------------------------<  87  3.7   |  23  |  0.77    Ca    8.5      21 May 2022 07:33  Phos  4.1     05-  Mg     2.0     -    TPro  6.3  /  Alb  3.6  /  TBili  0.4  /  DBili  x   /  AST  27  /  ALT  20  /  AlkPhos  55  05-20        Lipid Profile: Lipid Profile (20 @ 06:11)    Total Cholesterol/HDL Ratio Measurement: 2.2 RATIO    Cholesterol, Serum: 130 mg/dL    Triglycerides, Serum: 100 mg/dL    HDL Cholesterol, Serum: 60: HDL Levels >/= 60 mg/dL are considered beneficial and a "negative" risk  factor.  Effective 08/15/2018: New reference range and interpretive comment. mg/dL    Direct LDL: 50: LDL Cholesterol (mg/dL) --- Interpretive Comment (for adults 18 and over)  Optimal LDL Level may vary based on clinical situation  Below 70                   Ideal for people at very high risk of heart  disease  Below 100                  Ideal for people at risk of heart disease  100 - 129                    [place]  130 - 159                    Borderline high  160 - 189                    High  190 and Above           Very high mg/dL        HgA1c:   TSH:   Urinalysis Basic - ( 19 May 2022 20:57 )    Color: Yellow / Appearance: Clear / S.020 / pH: x  Gluc: x / Ketone: Trace mg/dL  / Bili: Negative / Urobili: 2.0 E.U./dL   Blood: x / Protein: NEGATIVE mg/dL / Nitrite: NEGATIVE   Leuk Esterase: NEGATIVE / RBC: x / WBC x   Sq Epi: x / Non Sq Epi: x / Bacteria: x        GI PCR Panel, Stool (collected 20 May 2022 08:13)  Source: .Stool None  Final Report (20 May 2022 11:16):    Norovirus GI/GII    DETECTED by PCR    *******Please Note:*******    GI panel PCR evaluates for:    Campylobacter, Plesiomonas shigelloides, Salmonella,    Vibrio, Yersinia enterocolitica, Enteroaggregative    Escherichia coli (EAEC), Enteropathogenic E.coli (EPEC),    Enterotoxigenic E. coli (ETEC) lt/st, Shiga-like    toxin-producing E. coli (STEC) stx1/stx2,    Shigella/ Enteroinvasive E. coli (EIEC), Cryptosporidium,    Cyclospora cayetanensis, Entamoeba histolytica,    Giardia lamblia, Adenovirus F 40/41, Astrovirus,    Norovirus GI/GII, Rotavirus A, Sapovirus    Urinalysis with Rflx Culture (collected 19 May 2022 20:57)        RADIOLOGY & ADDITIONAL STUDIES:    Other misc imaging:   < from: CT Abdomen and Pelvis w/ Oral Cont and w/ IV Cont (22 @ 22:17) >  FINDINGS:  LOWER CHEST: Within normal limits.    LIVER: Within normal limits.  BILE DUCTS: Normal caliber.  GALLBLADDER: Within normal limits.  SPLEEN: Within normal limits.  PANCREAS: Within normal limits.  ADRENALS: Within normal limits.  KIDNEYS/URETERS: Within normal limits.    BLADDER: Within normal limits.  REPRODUCTIVE ORGANS: Uterus and adnexa within normal limits. Vaginal   contraceptive in place.    BOWEL: There is mild colonic wall thickening and slightly increased   mucosal enhancement, more conspicuous along the ascending and transverse   colon. No bowel obstruction. No free air. Appendix is normal.  PERITONEUM: No ascites.  VESSELS: Within normal limits.  RETROPERITONEUM/LYMPH NODES: No lymphadenopathy.  ABDOMINAL WALL: Within normal limits.  BONES: Within normal limits.    IMPRESSION:  Mild colitis.    --- End of Report ---    < end of copied text >    < from: CT Abdomen and Pelvis w/ Oral Cont and w/ IV Cont (21 @ 14:50) >  IMPRESSION:  No bowel obstruction.    Tiny focus of gas in the nondependent portion of the bladder. Correlation with any history of recent instrumentation is suggested. In the absence of recent instrumentation, infection or fistula to bowel is considered..    < end of copied text >

## 2022-05-23 LAB — SURGICAL PATHOLOGY STUDY: SIGNIFICANT CHANGE UP

## 2022-05-26 DIAGNOSIS — K92.1 MELENA: ICD-10-CM

## 2022-05-26 DIAGNOSIS — A08.11 ACUTE GASTROENTEROPATHY DUE TO NORWALK AGENT: ICD-10-CM

## 2022-05-26 DIAGNOSIS — Z86.16 PERSONAL HISTORY OF COVID-19: ICD-10-CM

## 2022-05-26 DIAGNOSIS — K29.71 GASTRITIS, UNSPECIFIED, WITH BLEEDING: ICD-10-CM

## 2022-05-26 DIAGNOSIS — K51.90 ULCERATIVE COLITIS, UNSPECIFIED, WITHOUT COMPLICATIONS: ICD-10-CM

## 2022-05-26 DIAGNOSIS — K29.70 GASTRITIS, UNSPECIFIED, WITHOUT BLEEDING: ICD-10-CM

## 2022-06-28 DIAGNOSIS — Z00.00 ENCOUNTER FOR GENERAL ADULT MEDICAL EXAMINATION W/OUT ABNORMAL FINDINGS: ICD-10-CM

## 2022-06-29 ENCOUNTER — NON-APPOINTMENT (OUTPATIENT)
Age: 32
End: 2022-06-29

## 2022-06-29 LAB
HBV SURFACE AB SER QL: REACTIVE
HBV SURFACE AG SER QL: NONREACTIVE
MEV IGG FLD QL IA: >300 AU/ML
MEV IGG+IGM SER-IMP: POSITIVE
MUV AB SER-ACNC: POSITIVE
MUV IGG SER QL IA: >300 AU/ML
RUBV IGG FLD-ACNC: 21.9 INDEX
RUBV IGG SER-IMP: POSITIVE
VZV AB TITR SER: POSITIVE
VZV IGG SER IF-ACNC: 1230 INDEX

## 2022-07-15 ENCOUNTER — APPOINTMENT (OUTPATIENT)
Dept: PULMONOLOGY | Facility: CLINIC | Age: 32
End: 2022-07-15

## 2022-07-15 VITALS
DIASTOLIC BLOOD PRESSURE: 70 MMHG | SYSTOLIC BLOOD PRESSURE: 108 MMHG | WEIGHT: 111 LBS | BODY MASS INDEX: 20.97 KG/M2 | OXYGEN SATURATION: 97 % | TEMPERATURE: 98 F | HEART RATE: 72 BPM

## 2022-07-15 DIAGNOSIS — Z02.1 ENCOUNTER FOR PRE-EMPLOYMENT EXAMINATION: ICD-10-CM

## 2022-07-15 PROCEDURE — 99214 OFFICE O/P EST MOD 30 MIN: CPT

## 2022-07-15 RX ORDER — PREDNISONE 10 MG/1
10 TABLET ORAL DAILY
Qty: 30 | Refills: 0 | Status: DISCONTINUED | COMMUNITY
Start: 2022-01-07 | End: 2022-07-15

## 2022-07-15 RX ORDER — GUAIFENESIN AND CODEINE PHOSPHATE 100; 10 MG/5ML; MG/5ML
100-10 SYRUP ORAL
Qty: 1 | Refills: 0 | Status: DISCONTINUED | COMMUNITY
Start: 2022-01-07 | End: 2022-07-15

## 2022-07-15 RX ORDER — HYDROCODONE BITARTRATE AND HOMATROPINE METHYLBROMIDE 5; 1.5 MG/5ML; MG/5ML
5-1.5 SYRUP ORAL
Qty: 1 | Refills: 0 | Status: DISCONTINUED | COMMUNITY
Start: 2022-01-07 | End: 2022-07-15

## 2022-07-15 RX ORDER — CHLORHEXIDINE GLUCONATE 4 %
325 (65 FE) LIQUID (ML) TOPICAL TWICE DAILY
Qty: 60 | Refills: 5 | Status: DISCONTINUED | COMMUNITY
Start: 2020-05-07 | End: 2022-07-15

## 2022-07-15 RX ORDER — DICYCLOMINE HYDROCHLORIDE 10 MG/1
10 CAPSULE ORAL EVERY 6 HOURS
Qty: 120 | Refills: 5 | Status: DISCONTINUED | COMMUNITY
Start: 2020-05-07 | End: 2022-07-15

## 2022-07-15 RX ORDER — LACTOBACIL 2/BIFIDO 1/S.THERMO 900B CELL
PACKET (EA) ORAL
Qty: 1 | Refills: 3 | Status: DISCONTINUED | COMMUNITY
Start: 2019-06-11 | End: 2022-07-15

## 2022-07-15 RX ORDER — CHOLECALCIFEROL (VITAMIN D3) 1250 MCG
1.25 MG CAPSULE ORAL
Qty: 4 | Refills: 2 | Status: DISCONTINUED | COMMUNITY
Start: 2019-07-30 | End: 2022-07-15

## 2022-07-15 NOTE — HISTORY OF PRESENT ILLNESS
[Never] : never [TextBox_4] : She came for a preemployment physical.  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  No cough, wheezing or shortness of breath.  No abdominal pain.  She has been using albuterol minimally.

## 2022-07-15 NOTE — DISCUSSION/SUMMARY
[FreeTextEntry1] : She is a 31 year old woman with history of asthma and inflammatory bowel disease. Had UTI 11/20 with E coli resistant to several antibiotics but sensitive to Amox/clav which she was treated with.On 12/7 started with UTI sx again. On 12/12 developed R flank pain. Seen in ER at [facility] where she works. CT did not show any stones, hydronephrosis or pyelonephritis. No other pathology as well. \par \par Her asthma is under control.  Her physical examination was unremarkable.  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

## 2022-07-15 NOTE — PHYSICAL EXAM
[No Acute Distress] : no acute distress [Normal Oropharynx] : normal oropharynx [No Neck Mass] : no neck mass [Normal S1, S2] : normal s1, s2 [Clear to Auscultation Bilaterally] : clear to auscultation bilaterally [Soft] : soft [Normal Gait] : normal gait [No Edema] : no edema [No Focal Deficits] : no focal deficits [Oriented x3] : oriented x3 [No HSM] : no hsm [No Cyanosis] : no cyanosis [TextBox_89] : Tender in anterior lower abdomen and has R flank pain

## 2022-07-15 NOTE — REVIEW OF SYSTEMS
[Abdominal Pain] : abdominal pain [Nausea] : nausea [Dysuria] : dysuria [Frequency] : frequency [Urgency] : urgency [Fever] : no fever [Fatigue] : no fatigue [Chills] : no chills [Sinus Problems] : no sinus problems [Cough] : no cough [Dyspnea] : no dyspnea [Wheezing] : no wheezing [Chest Discomfort] : no chest discomfort [Edema] : no edema [Palpitations] : no palpitations [Nasal Discharge] : no nasal discharge [Vomiting] : no vomiting [Diarrhea] : no diarrhea [Constipation] : no constipation [Dysphagia] : no dysphagia [Bleeding] : no bleeding [Irregular Menses] : no irregular menses [Myalgias] : no myalgias [Rash] : no rash [Anemia] : no anemia [Headache] : no headache [Depression] : no depression [Diabetes] : no diabetes [Thyroid Problem] : no thyroid problem

## 2022-08-09 NOTE — CONSULT NOTE ADULT - PROVIDER SPECIALTY LIST ADULT
Gastroenterology
VITAL SIGNS: I have reviewed nursing notes and confirm.  CONSTITUTIONAL: well-appearing, appropriate for age, non-toxic, NAD  SKIN: Warm dry, normal skin turgor  HEAD: NCAT  EYES: PERRLA  ENT: Moist mucous membranes, normal pharynx with no erythema or exudates.  TM's normal b/l without bulging, no mastoid tenderness  NECK: Supple; non tender. Full ROM. No cervical LAD  CARD: RRR, no murmurs, rubs or gallops  RESP: clear to ausculation b/l.  No rales, rhonchi, or wheezing.  ABD: soft, + BS, non-tender, non-distended, no rebound or guarding. No CVA tenderness  EXT: Full ROM, no bony tenderness, no pedal edema, no calf tenderness  NEURO: normal motor. normal sensory.

## 2022-08-13 NOTE — ED ADULT NURSE NOTE - SUICIDE SCREENING QUESTION 3
Problem: At Risk for Falls  Goal: # Patient does not fall  Outcome: Outcome Met, Continue evaluating goal progress toward completion     Problem: At Risk for Injury Due to Fall  Goal: # Patient does not fall  Outcome: Outcome Met, Continue evaluating goal progress toward completion      No

## 2023-02-07 NOTE — PATIENT PROFILE ADULT - NSPROMEDSADMININFO_GEN_A_NUR
Isotretinoin Counseling: Patient should get monthly blood tests, not donate blood, not drive at night if vision affected, not share medication, and not undergo elective surgery for 6 months after tx completed. Side effects reviewed, pt to contact office should one occur. High Dose Vitamin A Counseling: Side effects reviewed, pt to contact office should one occur. Minocycline Counseling: Patient advised regarding possible photosensitivity and discoloration of the teeth, skin, lips, tongue and gums. Patient instructed to avoid sunlight, if possible. When exposed to sunlight, patients should wear protective clothing, sunglasses, and sunscreen. The patient was instructed to call the office immediately if the following severe adverse effects occur:  hearing changes, easy bruising/bleeding, severe headache, or vision changes. The patient verbalized understanding of the proper use and possible adverse effects of minocycline. All of the patient's questions and concerns were addressed. Winlevi Counseling:  I discussed with the patient the risks of topical clascoterone including but not limited to erythema, scaling, itching, and stinging. Patient voiced their understanding. Spironolactone Counseling: Patient advised regarding risks of diarrhea, abdominal pain, hyperkalemia, birth defects (for female patients), liver toxicity and renal toxicity. The patient may need blood work to monitor liver and kidney function and potassium levels while on therapy. The patient verbalized understanding of the proper use and possible adverse effects of spironolactone. All of the patient's questions and concerns were addressed. Topical Sulfur Applications Pregnancy And Lactation Text: This medication is Pregnancy Category C and has an unknown safety profile during pregnancy. It is unknown if this topical medication is excreted in breast milk. Bactrim Counseling:  I discussed with the patient the risks of sulfa antibiotics including but not limited to GI upset, allergic reaction, drug rash, diarrhea, dizziness, photosensitivity, and yeast infections. Rarely, more serious reactions can occur including but not limited to aplastic anemia, agranulocytosis, methemoglobinemia, blood dyscrasias, liver or kidney failure, lung infiltrates or desquamative/blistering drug rashes. Spironolactone Pregnancy And Lactation Text: This medication can cause feminization of the male fetus and should be avoided during pregnancy. The active metabolite is also found in breast milk. Tazorac Pregnancy And Lactation Text: This medication is not safe during pregnancy. It is unknown if this medication is excreted in breast milk. Topical Clindamycin Pregnancy And Lactation Text: This medication is Pregnancy Category B and is considered safe during pregnancy. It is unknown if it is excreted in breast milk. Birth Control Pills Counseling: Birth Control Pill Counseling: I discussed with the patient the potential side effects of OCPs including but not limited to increased risk of stroke, heart attack, thrombophlebitis, deep venous thrombosis, hepatic adenomas, breast changes, GI upset, headaches, and depression. The patient verbalized understanding of the proper use and possible adverse effects of OCPs. All of the patient's questions and concerns were addressed. Aklief Pregnancy And Lactation Text: It is unknown if this medication is safe to use during pregnancy. It is unknown if this medication is excreted in breast milk. Breastfeeding women should use the topical cream on the smallest area of the skin for the shortest time needed while breastfeeding. Do not apply to nipple and areola. Tetracycline Pregnancy And Lactation Text: This medication is Pregnancy Category D and not consider safe during pregnancy. It is also excreted in breast milk. Topical Retinoid Pregnancy And Lactation Text: This medication is Pregnancy Category C. It is unknown if this medication is excreted in breast milk. Dapsone Counseling: I discussed with the patient the risks of dapsone including but not limited to hemolytic anemia, agranulocytosis, rashes, methemoglobinemia, kidney failure, peripheral neuropathy, headaches, GI upset, and liver toxicity. Patients who start dapsone require monitoring including baseline LFTs and weekly CBCs for the first month, then every month thereafter. The patient verbalized understanding of the proper use and possible adverse effects of dapsone. All of the patient's questions and concerns were addressed. Azelaic Acid Pregnancy And Lactation Text: This medication is considered safe during pregnancy and breast feeding. Erythromycin Counseling:  I discussed with the patient the risks of erythromycin including but not limited to GI upset, allergic reaction, drug rash, diarrhea, increase in liver enzymes, and yeast infections. Use Enhanced Medication Counseling?: No Doxycycline Counseling:  Patient counseled regarding possible photosensitivity and increased risk for sunburn. Patient instructed to avoid sunlight, if possible. When exposed to sunlight, patients should wear protective clothing, sunglasses, and sunscreen. The patient was instructed to call the office immediately if the following severe adverse effects occur:  hearing changes, easy bruising/bleeding, severe headache, or vision changes. The patient verbalized understanding of the proper use and possible adverse effects of doxycycline. All of the patient's questions and concerns were addressed. Benzoyl Peroxide Pregnancy And Lactation Text: This medication is Pregnancy Category C. It is unknown if benzoyl peroxide is excreted in breast milk. Benzoyl Peroxide Counseling: Patient counseled that medicine may cause skin irritation and bleach clothing. In the event of skin irritation, the patient was advised to reduce the amount of the drug applied or use it less frequently. The patient verbalized understanding of the proper use and possible adverse effects of benzoyl peroxide. All of the patient's questions and concerns were addressed. Erythromycin Pregnancy And Lactation Text: This medication is Pregnancy Category B and is considered safe during pregnancy. It is also excreted in breast milk. High Dose Vitamin A Pregnancy And Lactation Text: High dose vitamin A therapy is contraindicated during pregnancy and breast feeding. Detail Level: Detailed Isotretinoin Pregnancy And Lactation Text: This medication is Pregnancy Category X and is considered extremely dangerous during pregnancy. It is unknown if it is excreted in breast milk. Winlevi Pregnancy And Lactation Text: This medication is considered safe during pregnancy and breastfeeding. Azithromycin Counseling:  I discussed with the patient the risks of azithromycin including but not limited to GI upset, allergic reaction, drug rash, diarrhea, and yeast infections. Sarecycline Counseling: Patient advised regarding possible photosensitivity and discoloration of the teeth, skin, lips, tongue and gums. Patient instructed to avoid sunlight, if possible. When exposed to sunlight, patients should wear protective clothing, sunglasses, and sunscreen. The patient was instructed to call the office immediately if the following severe adverse effects occur:  hearing changes, easy bruising/bleeding, severe headache, or vision changes. The patient verbalized understanding of the proper use and possible adverse effects of sarecycline. All of the patient's questions and concerns were addressed. Topical Sulfur Applications Counseling: Topical Sulfur Counseling: Patient counseled that this medication may cause skin irritation or allergic reactions. In the event of skin irritation, the patient was advised to reduce the amount of the drug applied or use it less frequently. The patient verbalized understanding of the proper use and possible adverse effects of topical sulfur application. All of the patient's questions and concerns were addressed. Azithromycin Pregnancy And Lactation Text: This medication is considered safe during pregnancy and is also secreted in breast milk. Tetracycline Counseling: Patient counseled regarding possible photosensitivity and increased risk for sunburn. Patient instructed to avoid sunlight, if possible. When exposed to sunlight, patients should wear protective clothing, sunglasses, and sunscreen. The patient was instructed to call the office immediately if the following severe adverse effects occur:  hearing changes, easy bruising/bleeding, severe headache, or vision changes. The patient verbalized understanding of the proper use and possible adverse effects of tetracycline. All of the patient's questions and concerns were addressed. Patient understands to avoid pregnancy while on therapy due to potential birth defects. Topical Clindamycin Counseling: Patient counseled that this medication may cause skin irritation or allergic reactions. In the event of skin irritation, the patient was advised to reduce the amount of the drug applied or use it less frequently. The patient verbalized understanding of the proper use and possible adverse effects of clindamycin. All of the patient's questions and concerns were addressed. Birth Control Pills Pregnancy And Lactation Text: This medication should be avoided if pregnant and for the first 30 days post-partum. Aklief counseling:  Patient advised to apply a pea-sized amount only at bedtime and wait 30 minutes after washing their face before applying. If too drying, patient may add a non-comedogenic moisturizer. The most commonly reported side effects including irritation, redness, scaling, dryness, stinging, burning, itching, and increased risk of sunburn. The patient verbalized understanding of the proper use and possible adverse effects of retinoids. All of the patient's questions and concerns were addressed. Bactrim Pregnancy And Lactation Text: This medication is Pregnancy Category D and is known to cause fetal risk. It is also excreted in breast milk. Dapsone Pregnancy And Lactation Text: This medication is Pregnancy Category C and is not considered safe during pregnancy or breast feeding. Tazorac Counseling:  Patient advised that medication is irritating and drying. Patient may need to apply sparingly and wash off after an hour before eventually leaving it on overnight. The patient verbalized understanding of the proper use and possible adverse effects of tazorac. All of the patient's questions and concerns were addressed. Azelaic Acid Counseling: Patient counseled that medicine may cause skin irritation and to avoid applying near the eyes. In the event of skin irritation, the patient was advised to reduce the amount of the drug applied or use it less frequently. The patient verbalized understanding of the proper use and possible adverse effects of azelaic acid. All of the patient's questions and concerns were addressed. Doxycycline Pregnancy And Lactation Text: This medication is Pregnancy Category D and not consider safe during pregnancy. It is also excreted in breast milk but is considered safe for shorter treatment courses. Topical Retinoid counseling:  Patient advised to apply a pea-sized amount only at bedtime and wait 30 minutes after washing their face before applying. If too drying, patient may add a non-comedogenic moisturizer. The patient verbalized understanding of the proper use and possible adverse effects of retinoids. All of the patient's questions and concerns were addressed. no concerns

## 2023-04-03 ENCOUNTER — INPATIENT (INPATIENT)
Facility: HOSPITAL | Age: 33
LOS: 1 days | Discharge: HOME | DRG: 373 | End: 2023-04-05
Attending: STUDENT IN AN ORGANIZED HEALTH CARE EDUCATION/TRAINING PROGRAM | Admitting: STUDENT IN AN ORGANIZED HEALTH CARE EDUCATION/TRAINING PROGRAM
Payer: COMMERCIAL

## 2023-04-03 VITALS
SYSTOLIC BLOOD PRESSURE: 101 MMHG | HEART RATE: 101 BPM | DIASTOLIC BLOOD PRESSURE: 69 MMHG | WEIGHT: 115.08 LBS | HEIGHT: 61 IN | TEMPERATURE: 98 F | OXYGEN SATURATION: 100 % | RESPIRATION RATE: 17 BRPM

## 2023-04-03 DIAGNOSIS — K51.90 ULCERATIVE COLITIS, UNSPECIFIED, WITHOUT COMPLICATIONS: ICD-10-CM

## 2023-04-03 DIAGNOSIS — A08.11 ACUTE GASTROENTEROPATHY DUE TO NORWALK AGENT: ICD-10-CM

## 2023-04-03 DIAGNOSIS — R63.8 OTHER SYMPTOMS AND SIGNS CONCERNING FOOD AND FLUID INTAKE: ICD-10-CM

## 2023-04-03 DIAGNOSIS — K52.9 NONINFECTIVE GASTROENTERITIS AND COLITIS, UNSPECIFIED: ICD-10-CM

## 2023-04-03 DIAGNOSIS — A04.72 ENTEROCOLITIS DUE TO CLOSTRIDIUM DIFFICILE, NOT SPECIFIED AS RECURRENT: ICD-10-CM

## 2023-04-03 LAB
ALBUMIN SERPL ELPH-MCNC: 4 G/DL — SIGNIFICANT CHANGE UP (ref 3.3–5)
ALP SERPL-CCNC: 69 U/L — SIGNIFICANT CHANGE UP (ref 40–120)
ALT FLD-CCNC: 24 U/L — SIGNIFICANT CHANGE UP (ref 10–45)
ANION GAP SERPL CALC-SCNC: 10 MMOL/L — SIGNIFICANT CHANGE UP (ref 5–17)
ANION GAP SERPL CALC-SCNC: 11 MMOL/L — SIGNIFICANT CHANGE UP (ref 5–17)
APPEARANCE UR: CLEAR — SIGNIFICANT CHANGE UP
AST SERPL-CCNC: 26 U/L — SIGNIFICANT CHANGE UP (ref 10–40)
BACTERIA # UR AUTO: PRESENT /HPF
BASOPHILS # BLD AUTO: 0.02 K/UL — SIGNIFICANT CHANGE UP (ref 0–0.2)
BASOPHILS NFR BLD AUTO: 0.2 % — SIGNIFICANT CHANGE UP (ref 0–2)
BILIRUB SERPL-MCNC: 0.3 MG/DL — SIGNIFICANT CHANGE UP (ref 0.2–1.2)
BILIRUB UR-MCNC: NEGATIVE — SIGNIFICANT CHANGE UP
BUN SERPL-MCNC: 12 MG/DL — SIGNIFICANT CHANGE UP (ref 7–23)
BUN SERPL-MCNC: 5 MG/DL — LOW (ref 7–23)
C DIFF GDH STL QL: SIGNIFICANT CHANGE UP
C DIFF GDH STL QL: SIGNIFICANT CHANGE UP
CALCIUM SERPL-MCNC: 8 MG/DL — LOW (ref 8.4–10.5)
CALCIUM SERPL-MCNC: 9 MG/DL — SIGNIFICANT CHANGE UP (ref 8.4–10.5)
CHLORIDE SERPL-SCNC: 107 MMOL/L — SIGNIFICANT CHANGE UP (ref 96–108)
CHLORIDE SERPL-SCNC: 108 MMOL/L — SIGNIFICANT CHANGE UP (ref 96–108)
CO2 SERPL-SCNC: 21 MMOL/L — LOW (ref 22–31)
CO2 SERPL-SCNC: 23 MMOL/L — SIGNIFICANT CHANGE UP (ref 22–31)
COLOR SPEC: YELLOW — SIGNIFICANT CHANGE UP
COMMENT - URINE: SIGNIFICANT CHANGE UP
CREAT SERPL-MCNC: 0.58 MG/DL — SIGNIFICANT CHANGE UP (ref 0.5–1.3)
CREAT SERPL-MCNC: 0.64 MG/DL — SIGNIFICANT CHANGE UP (ref 0.5–1.3)
CRP SERPL-MCNC: <3 MG/L — SIGNIFICANT CHANGE UP (ref 0–4)
DIFF PNL FLD: NEGATIVE — SIGNIFICANT CHANGE UP
EGFR: 120 ML/MIN/1.73M2 — SIGNIFICANT CHANGE UP
EGFR: 122 ML/MIN/1.73M2 — SIGNIFICANT CHANGE UP
EOSINOPHIL # BLD AUTO: 0.01 K/UL — SIGNIFICANT CHANGE UP (ref 0–0.5)
EOSINOPHIL NFR BLD AUTO: 0.1 % — SIGNIFICANT CHANGE UP (ref 0–6)
EPI CELLS # UR: SIGNIFICANT CHANGE UP /HPF (ref 0–5)
ERYTHROCYTE [SEDIMENTATION RATE] IN BLOOD: 2 MM/HR — SIGNIFICANT CHANGE UP
GI PCR PANEL: DETECTED
GLUCOSE SERPL-MCNC: 103 MG/DL — HIGH (ref 70–99)
GLUCOSE SERPL-MCNC: 142 MG/DL — HIGH (ref 70–99)
GLUCOSE UR QL: NEGATIVE — SIGNIFICANT CHANGE UP
HCG SERPL-ACNC: <0 MIU/ML — SIGNIFICANT CHANGE UP
HCT VFR BLD CALC: 46.3 % — HIGH (ref 34.5–45)
HGB BLD-MCNC: 15.1 G/DL — SIGNIFICANT CHANGE UP (ref 11.5–15.5)
IMM GRANULOCYTES NFR BLD AUTO: 0.2 % — SIGNIFICANT CHANGE UP (ref 0–0.9)
KETONES UR-MCNC: ABNORMAL MG/DL
LEUKOCYTE ESTERASE UR-ACNC: NEGATIVE — SIGNIFICANT CHANGE UP
LIDOCAIN IGE QN: 23 U/L — SIGNIFICANT CHANGE UP (ref 7–60)
LYMPHOCYTES # BLD AUTO: 0.71 K/UL — LOW (ref 1–3.3)
LYMPHOCYTES # BLD AUTO: 7.4 % — LOW (ref 13–44)
MAGNESIUM SERPL-MCNC: 1.7 MG/DL — SIGNIFICANT CHANGE UP (ref 1.6–2.6)
MAGNESIUM SERPL-MCNC: 2.3 MG/DL — SIGNIFICANT CHANGE UP (ref 1.6–2.6)
MCHC RBC-ENTMCNC: 29.2 PG — SIGNIFICANT CHANGE UP (ref 27–34)
MCHC RBC-ENTMCNC: 32.6 GM/DL — SIGNIFICANT CHANGE UP (ref 32–36)
MCV RBC AUTO: 89.4 FL — SIGNIFICANT CHANGE UP (ref 80–100)
MONOCYTES # BLD AUTO: 0.45 K/UL — SIGNIFICANT CHANGE UP (ref 0–0.9)
MONOCYTES NFR BLD AUTO: 4.7 % — SIGNIFICANT CHANGE UP (ref 2–14)
NEUTROPHILS # BLD AUTO: 8.38 K/UL — HIGH (ref 1.8–7.4)
NEUTROPHILS NFR BLD AUTO: 87.4 % — HIGH (ref 43–77)
NITRITE UR-MCNC: NEGATIVE — SIGNIFICANT CHANGE UP
NOROVIRUS GI+II RNA STL QL NAA+NON-PROBE: DETECTED
NRBC # BLD: 0 /100 WBCS — SIGNIFICANT CHANGE UP (ref 0–0)
PH UR: 5.5 — SIGNIFICANT CHANGE UP (ref 5–8)
PHOSPHATE SERPL-MCNC: 2.6 MG/DL — SIGNIFICANT CHANGE UP (ref 2.5–4.5)
PHOSPHATE SERPL-MCNC: 3.4 MG/DL — SIGNIFICANT CHANGE UP (ref 2.5–4.5)
PLATELET # BLD AUTO: 256 K/UL — SIGNIFICANT CHANGE UP (ref 150–400)
POTASSIUM SERPL-MCNC: 3.7 MMOL/L — SIGNIFICANT CHANGE UP (ref 3.5–5.3)
POTASSIUM SERPL-MCNC: 4 MMOL/L — SIGNIFICANT CHANGE UP (ref 3.5–5.3)
POTASSIUM SERPL-SCNC: 3.7 MMOL/L — SIGNIFICANT CHANGE UP (ref 3.5–5.3)
POTASSIUM SERPL-SCNC: 4 MMOL/L — SIGNIFICANT CHANGE UP (ref 3.5–5.3)
PROT SERPL-MCNC: 7.1 G/DL — SIGNIFICANT CHANGE UP (ref 6–8.3)
PROT UR-MCNC: ABNORMAL MG/DL
RBC # BLD: 5.18 M/UL — SIGNIFICANT CHANGE UP (ref 3.8–5.2)
RBC # FLD: 13.2 % — SIGNIFICANT CHANGE UP (ref 10.3–14.5)
RBC CASTS # UR COMP ASSIST: < 5 /HPF — SIGNIFICANT CHANGE UP
SARS-COV-2 RNA SPEC QL NAA+PROBE: SIGNIFICANT CHANGE UP
SODIUM SERPL-SCNC: 139 MMOL/L — SIGNIFICANT CHANGE UP (ref 135–145)
SODIUM SERPL-SCNC: 141 MMOL/L — SIGNIFICANT CHANGE UP (ref 135–145)
SP GR SPEC: >=1.03 — SIGNIFICANT CHANGE UP (ref 1–1.03)
UROBILINOGEN FLD QL: 0.2 E.U./DL — SIGNIFICANT CHANGE UP
WBC # BLD: 9.59 K/UL — SIGNIFICANT CHANGE UP (ref 3.8–10.5)
WBC # FLD AUTO: 9.59 K/UL — SIGNIFICANT CHANGE UP (ref 3.8–10.5)
WBC UR QL: ABNORMAL /HPF

## 2023-04-03 PROCEDURE — 74177 CT ABD & PELVIS W/CONTRAST: CPT | Mod: 26,MA

## 2023-04-03 PROCEDURE — 99222 1ST HOSP IP/OBS MODERATE 55: CPT | Mod: GC

## 2023-04-03 PROCEDURE — 99285 EMERGENCY DEPT VISIT HI MDM: CPT

## 2023-04-03 RX ORDER — ACETAMINOPHEN 500 MG
650 TABLET ORAL EVERY 6 HOURS
Refills: 0 | Status: DISCONTINUED | OUTPATIENT
Start: 2023-04-03 | End: 2023-04-03

## 2023-04-03 RX ORDER — KETOROLAC TROMETHAMINE 30 MG/ML
10 SYRINGE (ML) INJECTION EVERY 6 HOURS
Refills: 0 | Status: DISCONTINUED | OUTPATIENT
Start: 2023-04-03 | End: 2023-04-05

## 2023-04-03 RX ORDER — DIPHENHYDRAMINE HCL 50 MG
25 CAPSULE ORAL ONCE
Refills: 0 | Status: COMPLETED | OUTPATIENT
Start: 2023-04-03 | End: 2023-04-03

## 2023-04-03 RX ORDER — MESALAMINE 400 MG
800 TABLET, DELAYED RELEASE (ENTERIC COATED) ORAL EVERY 12 HOURS
Refills: 0 | Status: DISCONTINUED | OUTPATIENT
Start: 2023-04-03 | End: 2023-04-05

## 2023-04-03 RX ORDER — ONDANSETRON 8 MG/1
4 TABLET, FILM COATED ORAL EVERY 6 HOURS
Refills: 0 | Status: DISCONTINUED | OUTPATIENT
Start: 2023-04-03 | End: 2023-04-05

## 2023-04-03 RX ORDER — FAMOTIDINE 10 MG/ML
20 INJECTION INTRAVENOUS EVERY 24 HOURS
Refills: 0 | Status: DISCONTINUED | OUTPATIENT
Start: 2023-04-03 | End: 2023-04-04

## 2023-04-03 RX ORDER — KETOROLAC TROMETHAMINE 30 MG/ML
15 SYRINGE (ML) INJECTION ONCE
Refills: 0 | Status: DISCONTINUED | OUTPATIENT
Start: 2023-04-03 | End: 2023-04-03

## 2023-04-03 RX ORDER — ACETAMINOPHEN 500 MG
650 TABLET ORAL EVERY 6 HOURS
Refills: 0 | Status: DISCONTINUED | OUTPATIENT
Start: 2023-04-03 | End: 2023-04-05

## 2023-04-03 RX ORDER — POTASSIUM CHLORIDE 20 MEQ
10 PACKET (EA) ORAL ONCE
Refills: 0 | Status: COMPLETED | OUTPATIENT
Start: 2023-04-03 | End: 2023-04-03

## 2023-04-03 RX ORDER — MORPHINE SULFATE 50 MG/1
4 CAPSULE, EXTENDED RELEASE ORAL ONCE
Refills: 0 | Status: DISCONTINUED | OUTPATIENT
Start: 2023-04-03 | End: 2023-04-03

## 2023-04-03 RX ORDER — MESALAMINE 400 MG
2 TABLET, DELAYED RELEASE (ENTERIC COATED) ORAL
Refills: 0 | DISCHARGE

## 2023-04-03 RX ORDER — MAGNESIUM SULFATE 500 MG/ML
2 VIAL (ML) INJECTION ONCE
Refills: 0 | Status: COMPLETED | OUTPATIENT
Start: 2023-04-03 | End: 2023-04-03

## 2023-04-03 RX ORDER — DIATRIZOATE MEGLUMINE 180 MG/ML
30 INJECTION, SOLUTION INTRAVESICAL ONCE
Refills: 0 | Status: COMPLETED | OUTPATIENT
Start: 2023-04-03 | End: 2023-04-03

## 2023-04-03 RX ORDER — METRONIDAZOLE 500 MG
500 TABLET ORAL EVERY 8 HOURS
Refills: 0 | Status: DISCONTINUED | OUTPATIENT
Start: 2023-04-03 | End: 2023-04-03

## 2023-04-03 RX ORDER — SODIUM CHLORIDE 9 MG/ML
1000 INJECTION INTRAMUSCULAR; INTRAVENOUS; SUBCUTANEOUS ONCE
Refills: 0 | Status: COMPLETED | OUTPATIENT
Start: 2023-04-03 | End: 2023-04-03

## 2023-04-03 RX ORDER — VANCOMYCIN HCL 1 G
125 VIAL (EA) INTRAVENOUS EVERY 6 HOURS
Refills: 0 | Status: DISCONTINUED | OUTPATIENT
Start: 2023-04-03 | End: 2023-04-05

## 2023-04-03 RX ORDER — ONDANSETRON 8 MG/1
4 TABLET, FILM COATED ORAL ONCE
Refills: 0 | Status: COMPLETED | OUTPATIENT
Start: 2023-04-03 | End: 2023-04-03

## 2023-04-03 RX ORDER — SODIUM CHLORIDE 9 MG/ML
1000 INJECTION, SOLUTION INTRAVENOUS
Refills: 0 | Status: DISCONTINUED | OUTPATIENT
Start: 2023-04-03 | End: 2023-04-04

## 2023-04-03 RX ORDER — CEFTRIAXONE 500 MG/1
1000 INJECTION, POWDER, FOR SOLUTION INTRAMUSCULAR; INTRAVENOUS EVERY 24 HOURS
Refills: 0 | Status: DISCONTINUED | OUTPATIENT
Start: 2023-04-03 | End: 2023-04-03

## 2023-04-03 RX ORDER — SODIUM CHLORIDE 9 MG/ML
1000 INJECTION, SOLUTION INTRAVENOUS
Refills: 0 | Status: DISCONTINUED | OUTPATIENT
Start: 2023-04-03 | End: 2023-04-03

## 2023-04-03 RX ADMIN — SODIUM CHLORIDE 1000 MILLILITER(S): 9 INJECTION INTRAMUSCULAR; INTRAVENOUS; SUBCUTANEOUS at 03:00

## 2023-04-03 RX ADMIN — SODIUM CHLORIDE 1000 MILLILITER(S): 9 INJECTION INTRAMUSCULAR; INTRAVENOUS; SUBCUTANEOUS at 06:46

## 2023-04-03 RX ADMIN — Medication 10 MILLIGRAM(S): at 13:15

## 2023-04-03 RX ADMIN — SODIUM CHLORIDE 90 MILLILITER(S): 9 INJECTION, SOLUTION INTRAVENOUS at 14:00

## 2023-04-03 RX ADMIN — Medication 800 MILLIGRAM(S): at 19:12

## 2023-04-03 RX ADMIN — ONDANSETRON 4 MILLIGRAM(S): 8 TABLET, FILM COATED ORAL at 03:25

## 2023-04-03 RX ADMIN — Medication 25 GRAM(S): at 10:33

## 2023-04-03 RX ADMIN — SODIUM CHLORIDE 1000 MILLILITER(S): 9 INJECTION INTRAMUSCULAR; INTRAVENOUS; SUBCUTANEOUS at 03:50

## 2023-04-03 RX ADMIN — Medication 650 MILLIGRAM(S): at 13:15

## 2023-04-03 RX ADMIN — FAMOTIDINE 20 MILLIGRAM(S): 10 INJECTION INTRAVENOUS at 10:30

## 2023-04-03 RX ADMIN — Medication 125 MILLIGRAM(S): at 19:12

## 2023-04-03 RX ADMIN — ONDANSETRON 4 MILLIGRAM(S): 8 TABLET, FILM COATED ORAL at 13:15

## 2023-04-03 RX ADMIN — Medication 125 MILLIGRAM(S): at 10:39

## 2023-04-03 RX ADMIN — Medication 100 MILLIEQUIVALENT(S): at 21:13

## 2023-04-03 RX ADMIN — MORPHINE SULFATE 4 MILLIGRAM(S): 50 CAPSULE, EXTENDED RELEASE ORAL at 03:25

## 2023-04-03 RX ADMIN — Medication 10 MILLIGRAM(S): at 15:15

## 2023-04-03 RX ADMIN — SODIUM CHLORIDE 90 MILLILITER(S): 9 INJECTION, SOLUTION INTRAVENOUS at 19:14

## 2023-04-03 RX ADMIN — DIATRIZOATE MEGLUMINE 30 MILLILITER(S): 180 INJECTION, SOLUTION INTRAVESICAL at 03:25

## 2023-04-03 RX ADMIN — Medication 15 MILLIGRAM(S): at 19:13

## 2023-04-03 RX ADMIN — Medication 650 MILLIGRAM(S): at 15:15

## 2023-04-03 RX ADMIN — Medication 25 MILLIGRAM(S): at 03:40

## 2023-04-03 RX ADMIN — MORPHINE SULFATE 4 MILLIGRAM(S): 50 CAPSULE, EXTENDED RELEASE ORAL at 04:00

## 2023-04-03 NOTE — H&P ADULT - PROBLEM SELECTOR PLAN 3
F: s/p 2 L NS, will continue with gentle fluid for now pending PO intake   E: replete as needed   N: clear liquid diet   DVT ppx: none needd   GI ppx: none needed     Dispo: [facility] Patient w/ reported history of UC diagnosed in 2020. Reports she has seen Dr. Snow in the past and takes Mesalamine daily. Patient was last admitted at [facility] last year for possible UC flare - GI saw patient at that time. Per their notes, patient had a normal colonoscopy in 2021 and it was unclear if she had a true diagnosis of UC.   Patient was discharged at that time and was told to stop taking her Mesalamine and f/u with GI outpatient.  - Patient reports she has still been taking her Mesalamine   - Denies bloody stool, denies localization of pain to lower quadrants (which is what she has had in her prior flare)   - Will hold off on Mesalamine inpatient   - F/u outpatient w/ Dr. Snow Patient w/ reported history of UC diagnosed in 2020. Reports she has seen Dr. Snow in the past and takes Mesalamine daily. Patient was last admitted at [facility] last year for possible UC flare - GI saw patient at that time. Per their notes, patient had a normal colonoscopy in 2021 and it was unclear if she had a true diagnosis of UC.   Patient was discharged at that time and was told to stop taking her Mesalamine and f/u with GI outpatient.  - Patient reports she has still been taking her Mesalamine   - Denies bloody stool, denies localization of pain to lower quadrants (which is what she has had in her prior flare)   - Continue with home med Mesalamine 400 mg (2 pills BID) while inpatient   - F/u outpatient w/ Dr. Snow GI PCR positive for Norovirus.  - Supportive care   - Continue with IV hydration, encourage PO intake   - Replete electrolytes as needed

## 2023-04-03 NOTE — H&P ADULT - PROBLEM SELECTOR PLAN 2
Patient w/ reported history of UC diagnosed in 2020. Reports she has seen Dr. Snow in the past and takes Mesalamine daily. Patient was last admitted at [facility] last year for possible UC flare - GI saw patient at that time. Per their notes, patient had a normal colonoscopy in 2021 and it was unclear if she had a true diagnosis of UC.   Patient was discharged at that time and was told to stop taking her Mesalamine and f/u with GI outpatient.  - Patient reports she has still been taking her Mesalamine   - Denies bloody stool, denies localization of pain to lower quadrants (which is what she has had in her prior flare)   - Will hold off on Mesalamine inpatient   - F/u outpatient w/ Dr. Snow Patient found to have positive C. diff on admission. Likely causing symptoms as above.  - Treat with PO Vancomycin 125 mg q6h  - C/w fluid LR 90 cc/hr   - Zofran PRN for symptoms   - Clear liquid diet for now, advance as tolerated

## 2023-04-03 NOTE — H&P ADULT - PROBLEM SELECTOR PLAN 1
Patient presenting with one day of nausea, NBNB vomiting, watery diarrhea and crampy mid abdominal pain. Reports this occurred last night after dinner and woke her up in the middle of the night. Denies recent travel or sick contacts. Denies blood in her stool or vomit.   CTAP showing colitis of sigmoid colon and descending colon.   - Symptoms like 2/2 colitis as above, although still pending GI PCR and C. diff. Less likely UC flare given non bloody stool and questionable history of UC in the past.  - S/p 2 L NS in the ED   - Continue with fluids for now given severe diarrhea/vomiting and decreased PO intake   - Will treat with Flagyl and Ceftriaxone for now   - Zofran PRN for nausea/vomiting (monitor QTC)   - F/u GI PCR, C. diff   - Clear liquid diet for now, advance as tolerated   - Pain management for abdominal pain (avoid morphine as patient had allergic reaction in the ED) Patient presenting with one day of nausea, NBNB vomiting, watery diarrhea and crampy mid abdominal pain. Reports this occurred last night after dinner and woke her up in the middle of the night. Denies recent travel or sick contacts. Denies blood in her stool or vomit.   CTAP showing colitis of sigmoid colon and descending colon.   - Symptoms like 2/2 colitis as above, although still pending GI PCR and C. diff. Less likely UC flare given non bloody stool and questionable history of UC in the past.  - S/p 2 L NS in the ED   - Continue with fluids for now given severe diarrhea/vomiting and decreased PO intake   - Zofran PRN for nausea/vomiting (monitor QTC)   - F/u GI PCR, C. diff   - Clear liquid diet for now, advance as tolerated   - Pain management for abdominal pain (avoid morphine as patient had allergic reaction in the ED)  - Plan as below Patient presenting with one day of nausea, NBNB vomiting, watery diarrhea and crampy mid abdominal pain. Reports this occurred last night after dinner and woke her up in the middle of the night. Denies recent travel or sick contacts. Denies blood in her stool or vomit.   CTAP showing colitis of sigmoid colon and descending colon.   - Symptoms like 2/2 colitis as above, although still pending GI PCR and C. diff. Less likely UC flare given non bloody stool and questionable history of UC in the past.  - S/p 2 L NS in the ED   - Continue with fluids for now given severe diarrhea/vomiting and decreased PO intake   - Zofran PRN for nausea/vomiting (monitor QTC)   - F/u GI PCR, C. diff   - Clear liquid diet for now, advance as tolerated   - Pain management for abdominal pain (avoid morphine as patient had allergic reaction in the ED) - Tylenol for mild/moderate and Toradal PRN for severe pain   - Plan as below

## 2023-04-03 NOTE — H&P ADULT - NSHPSOCIALHISTORY_GEN_ALL_CORE
Patient lives with her . She is a nurse at [employer]. Reports occasional alcohol use, denies tobacco or drug use.

## 2023-04-03 NOTE — ED PROVIDER NOTE - PRINCIPAL DIAGNOSIS
Ulcerative colitis functional limitations in following categories/rehab potential/impairments found/therapy frequency/anticipated discharge recommendation/risk reduction/prevention

## 2023-04-03 NOTE — H&P ADULT - NSHPPHYSICALEXAM_GEN_ALL_CORE
.  VITAL SIGNS:  T(F): 97.8 (04-03-23 @ 06:30), Max: 97.8 (04-03-23 @ 06:30)  HR: 94 (04-03-23 @ 06:30) (94 - 101)  BP: 116/76 (04-03-23 @ 06:30) (101/69 - 116/76)  BP(mean): --  RR: 16 (04-03-23 @ 06:30) (16 - 17)  SpO2: 100% (04-03-23 @ 06:30) (100% - 100%)    PHYSICAL EXAM:    Constitutional: WDWN resting comfortably in bed; NAD  HEENT: NC/AT, PERRL, EOMI, anicteric sclera, no nasal discharge; uvula midline, no oropharyngeal erythema or exudates; dry MM   Neck: supple  Respiratory: CTA B/L; no W/R/R, no retractions. Patient is on room air, saturating well, speaking in full sentences, and in no respiratory distress.  Cardiac: +S1/S2; RRR; no M/R/G  Gastrointestinal: soft, NT/ND; no rebound or guarding; +BSx4. Mild discomfort to deep palpation around middle umbilical area.  Extremities: WWP, no clubbing or cyanosis; no peripheral edema  Musculoskeletal: NROM x4; no joint swelling, tenderness or erythema  Vascular: 2+ radial, DP/PT pulses B/L  Dermatologic: skin warm, dry and intact; no rashes, wounds, or scars  Neurologic: AAOx3; no focal deficits  Psychiatric: affect and characteristics of appearance, verbalizations, behaviors are appropriate

## 2023-04-03 NOTE — H&P ADULT - ASSESSMENT
Patient is a 33 yo F, with PMH of ulcerative colitis (diagnosed around 3 years ago, on Mesalamine, follows with Dr. Snow) presenting with one day of nausea, vomiting, and diarrhea, found to have colitis on CTAP and admitted for further management.  Patient is a 31 yo F, with PMH of ulcerative colitis (diagnosed around 3 years ago, on Mesalamine, follows with Dr. Snow) presenting with one day of nausea, vomiting, and diarrhea, found to have colitis on CTAP and positive for C. diff. Admitted for further management.

## 2023-04-03 NOTE — ED PROVIDER NOTE - NS ED ATTENDING STATEMENT MOD
This was a shared visit with the DOV. I reviewed and verified the documentation and independently performed the documented:

## 2023-04-03 NOTE — ED ADULT NURSE REASSESSMENT NOTE - NS ED NURSE REASSESS COMMENT FT1
Pt reports pain improved after morphine. Pt developed itching and hives on R arm after meds. 25mg IV benadryl given and AMOS Roth ordered

## 2023-04-03 NOTE — H&P ADULT - NSHPLABSRESULTS_GEN_ALL_CORE
.  LABS:                         15.1   9.59  )-----------( 256      ( 03 Apr 2023 03:37 )             46.3     04-03    141  |  107  |  12  ----------------------------<  142<H>  4.0   |  23  |  0.64    Ca    9.0      03 Apr 2023 03:37  Mg     1.7     04-03    TPro  7.1  /  Alb  4.0  /  TBili  0.3  /  DBili  x   /  AST  26  /  ALT  24  /  AlkPhos  69  04-03      Urinalysis Basic - ( 03 Apr 2023 03:50 )    Color: Yellow / Appearance: Clear / SG: >=1.030 / pH: x  Gluc: x / Ketone: Trace mg/dL  / Bili: Negative / Urobili: 0.2 E.U./dL   Blood: x / Protein: Trace mg/dL / Nitrite: NEGATIVE   Leuk Esterase: NEGATIVE / RBC: < 5 /HPF / WBC 5-10 /HPF   Sq Epi: x / Non Sq Epi: 0-5 /HPF / Bacteria: Present /HPF                RADIOLOGY, EKG & ADDITIONAL TESTS: Reviewed.

## 2023-04-03 NOTE — ED PROVIDER NOTE - IV ALTEPLASE EXCL REL HIDDEN
show Body Location Override (Optional - Billing Will Still Be Based On Selected Body Map Location If Applicable): right medial trapezial neck Detail Level: Zone Size Of Lesion In Cm (Optional): 0

## 2023-04-03 NOTE — ED PROVIDER NOTE - PROGRESS NOTE DETAILS
labs reassuring - no leukocytosis.   UA w 5-10wbcs, leuk / nitrite negative. - no urinary symptoms. defer abx.   CT "IMPRESSION: Colitis of the sigmoid colon and distal descending colon"    ct as above. will admit for UC flare, pain control / hydration.  GI paged - awaiting call back. pt admitted to medicine.

## 2023-04-03 NOTE — H&P ADULT - HISTORY OF PRESENT ILLNESS
Patient is a 33 yo F, with PMH of ulcerative colitis (diagnosed around 4 years ago, on Mesalamine, follows with Dr. Snow) presenting with one day of nausea, vomiting, and diarrhea. Patient reports that last night she ate dinner with her  and then felt a little nauseous after. She went to bed and then woke up around 11:30 PM feeling worst - she reports she burped and then ended up vomiting. Described the vomit as NBNB, and contained food remnants from her previous meal. Since that episode, she also has been having multiple episodes of explosive watery diarrhea. Patient reports the diarrhea was initially brown in color but now just appears watery. She denies any blood or black stool or vomit. Patient reports that since last night she has had around 15 episodes of watery diarrhea and 3 episodes of vomiting. Patient also endorses severe abdominal cramping, located in the middle of her stomach. Denies radiation of pain. She also feels cold but denies fevers. Patient denies any other ROS, denies recent travel, or recent sick contacts. Reports her  ate and drank the same thing as her yesterday evening and he is not sick. 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 Her last flare was ~2 years ago, when she was hospitalized here. At that time, she reports she was having bloody diarrhea and her abdominal pain was located to one of the lower quadrants. She does not think this is a UC flare as she has not had any bloody diarrhea and her abdominal pain is localized in the center. She takes Mesalamine (2 tablets in the AM and 2 in the PM) and follows with Dr. Snow.    ED Vitals: T 97.6 F, /69, , RR 17, O2 100% on RA   ED Labs: WBC 9.59 with 87.4% neutrophils, Hgb 15.1,  Patient is a 33 yo F, with PMH of ulcerative colitis (diagnosed around 3 years ago, on Mesalamine, follows with Dr. Snow) presenting with one day of nausea, vomiting, and diarrhea. Patient reports that last night she ate dinner with her  and then felt a little nauseous after. She went to bed and then woke up around 11:30 PM feeling worst - she reports she burped and then ended up vomiting. Described the vomit as NBNB, and contained food remnants from her previous meal. Since that episode, she also has been having multiple episodes of explosive watery diarrhea. Patient reports the diarrhea was initially brown in color but now just appears watery. She denies any blood or black stool or vomit. Patient reports that since last night she has had around 15 episodes of watery diarrhea and 3 episodes of vomiting. Patient also endorses severe abdominal cramping, located in the middle of her stomach. Denies radiation of pain. She also feels cold but denies fevers. Patient denies any other ROS, denies recent travel, or recent sick contacts. Reports her  ate and drank the same thing as her yesterday evening and he is not sick. 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 Her last flare was ~2 years ago, when she was hospitalized here. At that time, she reports she was having bloody diarrhea and her abdominal pain was located to one of the lower quadrants. She does not think this is a UC flare as she has not had any bloody diarrhea and her abdominal pain is localized in the center. She takes Mesalamine (2 tablets in the AM and 2 in the PM) and follows with Dr. Snow.    Of note, patient w/ last admission in May 2022 for possible UC flare. GI consulted at that time. Per records obtained by GI, patient had a colonoscopy in 2021 revealing normal mucosa, endoscopically and histologically and CT A/P at that time revealed inflammation of ascending and transverse colon, not typical pattern of distribution seen with UC. It was unclear if patient had a true underlying UC diagnosis or prior strongyloides infection which led to the initial abnormal 2020 colonoscopy results. Patient was discharged at that time and told to STOP taking the mesalamine - however, she reports she has still been taking it daily.    ED Vitals: T 97.6 F, /69, , RR 17, O2 100% on RA   ED Labs: WBC 9.59 with 87.4% neutrophils, Hgb 15.1, K 4.0, Mg 1.7  EKG:   UA: bacteria, trace protein and trace ketone   CTAP: Colitis of the sigmoid colon and distal descending colon    In the ED, patient was given Zofran 4 mg, Morphine 4 mg, Benadryl 25 mg, Gastrografin, and 2 L NS Patient is a 33 yo F, with PMH of ulcerative colitis (diagnosed around 3 years ago, on Mesalamine, follows with Dr. Snow) presenting with one day of nausea, vomiting, and diarrhea. Patient reports that last night she ate dinner with her  and then felt a little nauseous after. She went to bed and then woke up around 11:30 PM feeling worst - she reports she burped and then ended up vomiting. Described the vomit as NBNB, and contained food remnants from her previous meal. Since that episode, she also has been having multiple episodes of explosive watery diarrhea. Patient reports the diarrhea was initially brown in color but now just appears watery. She denies any blood or black stool or vomit. Patient reports that since last night she has had around 15 episodes of watery diarrhea and 3 episodes of vomiting. Patient also endorses severe abdominal cramping, located in the middle of her stomach. Denies radiation of pain. She also feels cold but denies fevers. Patient denies any other ROS, denies recent travel, or recent sick contacts. Reports her  ate and drank the same thing as her yesterday evening and he is not sick. 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 Her last flare was ~2 years ago, when she was hospitalized here. At that time, she reports she was having bloody diarrhea and her abdominal pain was located to one of the lower quadrants. She does not think this is a UC flare as she has not had any bloody diarrhea and her abdominal pain is localized in the center. She takes Mesalamine (2 tablets in the AM and 2 in the PM) and follows with Dr. Snow.    Of note, patient w/ last admission in May 2022 for possible UC flare. GI consulted at that time. Per records obtained by GI, patient had a colonoscopy in 2021 revealing normal mucosa, endoscopically and histologically and CT A/P at that time revealed inflammation of ascending and transverse colon, not typical pattern of distribution seen with UC. It was unclear if patient had a true underlying UC diagnosis or prior strongyloides infection which led to the initial abnormal 2020 colonoscopy results. Patient was discharged at that time and told to STOP taking the mesalamine - however, she reports she has still been taking it daily.    ED Vitals: T 97.6 F, /69, , RR 17, O2 100% on RA   ED Labs: WBC 9.59 with 87.4% neutrophils, Hgb 15.1, K 4.0, Mg 1.7  EKG: NSR,   UA: bacteria, trace protein and trace ketone   CTAP: Colitis of the sigmoid colon and distal descending colon    In the ED, patient was given Zofran 4 mg, Morphine 4 mg, Benadryl 25 mg, Gastrografin, and 2 L NS Patient is a 33 yo F, with PMH of ulcerative colitis (diagnosed around 3 years ago, on Mesalamine, follows with Dr. Snow) presenting with one day of nausea, vomiting, and diarrhea. Patient reports that last night she ate dinner with her  and then felt a little nauseous after. She went to bed and then woke up around 11:30 PM feeling worst - she reports she burped and then ended up vomiting. Described the vomit as NBNB, and contained food remnants from her previous meal. Since that episode, she also has been having multiple episodes of explosive watery diarrhea. Patient reports the diarrhea was initially brown in color but now just appears watery. She denies any blood or black stool or vomit. Patient reports that since last night she has had around 15 episodes of watery diarrhea and 3 episodes of vomiting. Patient also endorses severe abdominal cramping, located in the middle of her stomach. Denies radiation of pain. She also feels cold but denies fevers. Patient denies any other ROS, denies recent travel, or recent sick contacts. Reports her  ate and drank the same thing as her yesterday evening and he is not sick. 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 Her last flare was ~2 years ago, when she was hospitalized here. At that time, she reports she was having bloody diarrhea and her abdominal pain was located to one of the lower quadrants. She does not think this is a UC flare as she has not had any bloody diarrhea and her abdominal pain is localized in the center. She takes Mesalamine (2 tablets in the AM and 2 in the PM) and follows with Dr. Snow.    Of note, patient w/ last admission in May 2022 for possible UC flare. GI consulted at that time. Per records obtained by GI, patient had a colonoscopy in 2021 revealing normal mucosa, endoscopically and histologically and CT A/P at that time revealed inflammation of ascending and transverse colon, not typical pattern of distribution seen with UC. It was unclear if patient had a true underlying UC diagnosis or prior strongyloides infection which led to the initial abnormal 2020 colonoscopy results. Patient was discharged at that time and told to STOP taking the mesalamine - however, she reports she has still been taking it daily.    ED Vitals: T 97.6 F, /69, , RR 17, O2 100% on RA   ED Labs: WBC 9.59 with 87.4% neutrophils, Hgb 15.1, K 4.0, Mg 1.7  EKG: NSR,   UA: bacteria, trace protein and trace ketone   CTAP: Mild sigmoid colonic and rectal wall enhancement; possible distal colitis. No small bowel obstruction.    In the ED, patient was given Zofran 4 mg, Morphine 4 mg, Benadryl 25 mg, Gastrografin, and 2 L NS Patient is a 33 yo F, with PMH of ulcerative colitis (diagnosed around 3 years ago, on Mesalamine, follows with Dr. Snow) presenting with one day of nausea, vomiting, and diarrhea. Patient reports that last night she ate dinner with her  and then felt a little nauseous after. She went to bed and then woke up around 11:30 PM feeling worst - she reports she burped and then ended up vomiting. Described the vomit as NBNB, and contained food remnants from her previous meal. Since that episode, she also has been having multiple episodes of explosive watery diarrhea. Patient reports the diarrhea was initially brown in color but now just appears watery. She denies any blood or black stool or vomit. Patient reports that since last night she has had around 15 episodes of watery diarrhea and 3 episodes of vomiting. Patient also endorses severe abdominal cramping, located in the middle of her stomach. Denies radiation of pain. She also feels cold but denies fevers. Patient denies any other ROS, denies recent travel, or recent sick contacts. Reports her  ate and drank the same thing as her yesterday evening and he is not sick. 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 Her last flare was ~2 years ago, when she was hospitalized here. At that time, she reports she was having bloody diarrhea and her abdominal pain was located to one of the lower quadrants. She does not think this is a UC flare as she has not had any bloody diarrhea and her abdominal pain is localized in the center. She takes Mesalamine (2 tablets in the AM and 2 in the PM) and follows with Dr. Snow.    Of note, patient w/ last admission in May 2022 for possible UC flare. GI consulted at that time. Per records obtained by GI, patient had a colonoscopy in 2021 revealing normal mucosa, endoscopically and histologically and CT A/P at that time revealed inflammation of ascending and transverse colon, not typical pattern of distribution seen with UC. It was unclear if patient had a true underlying UC diagnosis or prior strongyloides infection which led to the initial abnormal 2020 colonoscopy results. Patient was discharged at that time and told to STOP taking the mesalamine - however, she reports she has still been taking it daily.    ED Vitals: T 97.6 F, /69, , RR 17, O2 100% on RA   ED Labs: WBC 9.59 with 87.4% neutrophils, Hgb 15.1, K 4.0, Mg 1.7. Beta HCG 0.  EKG: NSR,   UA: bacteria, trace protein and trace ketone   CTAP: Mild sigmoid colonic and rectal wall enhancement; possible distal colitis. No small bowel obstruction.    In the ED, patient was given Zofran 4 mg, Morphine 4 mg, Benadryl 25 mg, Gastrografin, and 2 L NS

## 2023-04-03 NOTE — H&P ADULT - PROBLEM SELECTOR PLAN 5
F: s/p 2 L NS, will continue with gentle fluid for now pending PO intake   E: replete as needed   N: clear liquid diet   DVT ppx: none needd   GI ppx: Famotidine    Contact precautions for C. diff    Dispo: [facility]

## 2023-04-03 NOTE — ED ADULT NURSE REASSESSMENT NOTE - COMFORT CARE
ambulated to bathroom/darkened lights/meal provided/po fluids offered/wait time explained/warm blanket provided

## 2023-04-03 NOTE — ED PROVIDER NOTE - CLINICAL SUMMARY MEDICAL DECISION MAKING FREE TEXT BOX
history of ulcerative colitis (diagnosed around 3 years ago, on Mesalamine, follows with Dr. Snow). here w one day lower abd cramping, n/v/d. similar to previous UC flares.  pt appears uncomfortable but nontoxic, mild tachycardia. vitals otherwise ok. lower abd tenderness but benign abdomen  possible UC flare vs colitis vs viral illness.   plan - labs, ua, ctap  analgesia / antiemetics prn  iv hydration   serial abd exams

## 2023-04-03 NOTE — ED PROVIDER NOTE - PHYSICAL EXAMINATION
Constitutional : Well appearing, non-toxic, no acute distress. awake, alert, oriented to person, place, time/situation.  Head : head normocephalic, atraumatic  EENMT : eyes clear bilaterally, PERRL, EOMI. airway patent. moist mucous membranes. neck supple.  Cardiac : Normal rate, regular rhythm. No murmur appreciated, no LE edema.  Resp : Breath sounds clear and equal bilaterally. Respirations even and unlabored.   Gastro : abdomen soft, nondistended. lower abd tenderness bilaterally. no rebound or guarding. no CVAT.  MSK :  range of motion is not limited, no muscle or joint tenderness  Vasc : Extremities warm and well perfused. 2+ radial and DP pulses. cap refill <2 seconds  Neuro : Alert and oriented, CNII-XII grossly intact, no focal deficits, no motor or sensory deficits.  Skin : Skin normal color for race, warm, dry and intact. No evidence of rash.  Psych : Alert and oriented to person, place, time/situation. normal mood and affect. no apparent risk to self or others.

## 2023-04-03 NOTE — H&P ADULT - PROBLEM SELECTOR PLAN 4
F: s/p 2 L NS, will continue with gentle fluid for now pending PO intake   E: replete as needed   N: clear liquid diet   DVT ppx: none needd   GI ppx: none needed     Dispo: [facility] F: s/p 2 L NS, will continue with gentle fluid for now pending PO intake   E: replete as needed   N: clear liquid diet   DVT ppx: none needd   GI ppx: none needed     Contact precautions for C. diff    Dispo: [facility] F: s/p 2 L NS, will continue with gentle fluid for now pending PO intake   E: replete as needed   N: clear liquid diet   DVT ppx: none needd   GI ppx: Famotidine    Contact precautions for C. diff    Dispo: [facility] Patient w/ reported history of UC diagnosed in 2020. Reports she has seen Dr. Snow in the past and takes Mesalamine daily. Patient was last admitted at [facility] last year for possible UC flare - GI saw patient at that time. Per their notes, patient had a normal colonoscopy in 2021 and it was unclear if she had a true diagnosis of UC.   Patient was discharged at that time and was told to stop taking her Mesalamine and f/u with GI outpatient.  - Patient reports she has still been taking her Mesalamine   - Denies bloody stool, denies localization of pain to lower quadrants (which is what she has had in her prior flare)   - Continue with home med Mesalamine 400 mg (2 pills BID) while inpatient   - F/u outpatient w/ Dr. Snow

## 2023-04-04 DIAGNOSIS — D70.9 NEUTROPENIA, UNSPECIFIED: ICD-10-CM

## 2023-04-04 LAB
ANION GAP SERPL CALC-SCNC: 4 MMOL/L — LOW (ref 5–17)
ANION GAP SERPL CALC-SCNC: 7 MMOL/L — SIGNIFICANT CHANGE UP (ref 5–17)
BASOPHILS # BLD AUTO: 0.01 K/UL — SIGNIFICANT CHANGE UP (ref 0–0.2)
BASOPHILS NFR BLD AUTO: 0.4 % — SIGNIFICANT CHANGE UP (ref 0–2)
BUN SERPL-MCNC: 3 MG/DL — LOW (ref 7–23)
BUN SERPL-MCNC: 4 MG/DL — LOW (ref 7–23)
CALCIUM SERPL-MCNC: 7.7 MG/DL — LOW (ref 8.4–10.5)
CALCIUM SERPL-MCNC: 7.9 MG/DL — LOW (ref 8.4–10.5)
CHLORIDE SERPL-SCNC: 108 MMOL/L — SIGNIFICANT CHANGE UP (ref 96–108)
CHLORIDE SERPL-SCNC: 109 MMOL/L — HIGH (ref 96–108)
CO2 SERPL-SCNC: 23 MMOL/L — SIGNIFICANT CHANGE UP (ref 22–31)
CO2 SERPL-SCNC: 25 MMOL/L — SIGNIFICANT CHANGE UP (ref 22–31)
CREAT SERPL-MCNC: 0.57 MG/DL — SIGNIFICANT CHANGE UP (ref 0.5–1.3)
CREAT SERPL-MCNC: 0.63 MG/DL — SIGNIFICANT CHANGE UP (ref 0.5–1.3)
EGFR: 120 ML/MIN/1.73M2 — SIGNIFICANT CHANGE UP
EGFR: 123 ML/MIN/1.73M2 — SIGNIFICANT CHANGE UP
EOSINOPHIL # BLD AUTO: 0.01 K/UL — SIGNIFICANT CHANGE UP (ref 0–0.5)
EOSINOPHIL NFR BLD AUTO: 0.4 % — SIGNIFICANT CHANGE UP (ref 0–6)
GLUCOSE SERPL-MCNC: 103 MG/DL — HIGH (ref 70–99)
GLUCOSE SERPL-MCNC: 92 MG/DL — SIGNIFICANT CHANGE UP (ref 70–99)
HCT VFR BLD CALC: 37.3 % — SIGNIFICANT CHANGE UP (ref 34.5–45)
HCT VFR BLD CALC: 39.2 % — SIGNIFICANT CHANGE UP (ref 34.5–45)
HGB BLD-MCNC: 12.2 G/DL — SIGNIFICANT CHANGE UP (ref 11.5–15.5)
HGB BLD-MCNC: 12.7 G/DL — SIGNIFICANT CHANGE UP (ref 11.5–15.5)
IMM GRANULOCYTES NFR BLD AUTO: 0 % — SIGNIFICANT CHANGE UP (ref 0–0.9)
LYMPHOCYTES # BLD AUTO: 1.11 K/UL — SIGNIFICANT CHANGE UP (ref 1–3.3)
LYMPHOCYTES # BLD AUTO: 44.2 % — HIGH (ref 13–44)
MAGNESIUM SERPL-MCNC: 2 MG/DL — SIGNIFICANT CHANGE UP (ref 1.6–2.6)
MAGNESIUM SERPL-MCNC: 2.1 MG/DL — SIGNIFICANT CHANGE UP (ref 1.6–2.6)
MCHC RBC-ENTMCNC: 29.1 PG — SIGNIFICANT CHANGE UP (ref 27–34)
MCHC RBC-ENTMCNC: 29.3 PG — SIGNIFICANT CHANGE UP (ref 27–34)
MCHC RBC-ENTMCNC: 32.4 GM/DL — SIGNIFICANT CHANGE UP (ref 32–36)
MCHC RBC-ENTMCNC: 32.7 GM/DL — SIGNIFICANT CHANGE UP (ref 32–36)
MCV RBC AUTO: 89.7 FL — SIGNIFICANT CHANGE UP (ref 80–100)
MCV RBC AUTO: 89.9 FL — SIGNIFICANT CHANGE UP (ref 80–100)
MONOCYTES # BLD AUTO: 0.2 K/UL — SIGNIFICANT CHANGE UP (ref 0–0.9)
MONOCYTES NFR BLD AUTO: 8 % — SIGNIFICANT CHANGE UP (ref 2–14)
NEUTROPHILS # BLD AUTO: 1.18 K/UL — LOW (ref 1.8–7.4)
NEUTROPHILS NFR BLD AUTO: 47 % — SIGNIFICANT CHANGE UP (ref 43–77)
NRBC # BLD: 0 /100 WBCS — SIGNIFICANT CHANGE UP (ref 0–0)
NRBC # BLD: 0 /100 WBCS — SIGNIFICANT CHANGE UP (ref 0–0)
PHOSPHATE SERPL-MCNC: 2.8 MG/DL — SIGNIFICANT CHANGE UP (ref 2.5–4.5)
PHOSPHATE SERPL-MCNC: 3.1 MG/DL — SIGNIFICANT CHANGE UP (ref 2.5–4.5)
PLATELET # BLD AUTO: 196 K/UL — SIGNIFICANT CHANGE UP (ref 150–400)
PLATELET # BLD AUTO: 203 K/UL — SIGNIFICANT CHANGE UP (ref 150–400)
POTASSIUM SERPL-MCNC: 3.9 MMOL/L — SIGNIFICANT CHANGE UP (ref 3.5–5.3)
POTASSIUM SERPL-MCNC: 4 MMOL/L — SIGNIFICANT CHANGE UP (ref 3.5–5.3)
POTASSIUM SERPL-SCNC: 3.9 MMOL/L — SIGNIFICANT CHANGE UP (ref 3.5–5.3)
POTASSIUM SERPL-SCNC: 4 MMOL/L — SIGNIFICANT CHANGE UP (ref 3.5–5.3)
RBC # BLD: 4.16 M/UL — SIGNIFICANT CHANGE UP (ref 3.8–5.2)
RBC # BLD: 4.36 M/UL — SIGNIFICANT CHANGE UP (ref 3.8–5.2)
RBC # FLD: 13.8 % — SIGNIFICANT CHANGE UP (ref 10.3–14.5)
RBC # FLD: 13.9 % — SIGNIFICANT CHANGE UP (ref 10.3–14.5)
SODIUM SERPL-SCNC: 137 MMOL/L — SIGNIFICANT CHANGE UP (ref 135–145)
SODIUM SERPL-SCNC: 139 MMOL/L — SIGNIFICANT CHANGE UP (ref 135–145)
WBC # BLD: 2.14 K/UL — LOW (ref 3.8–10.5)
WBC # BLD: 2.51 K/UL — LOW (ref 3.8–10.5)
WBC # FLD AUTO: 2.14 K/UL — LOW (ref 3.8–10.5)
WBC # FLD AUTO: 2.51 K/UL — LOW (ref 3.8–10.5)

## 2023-04-04 PROCEDURE — 99233 SBSQ HOSP IP/OBS HIGH 50: CPT | Mod: GC

## 2023-04-04 RX ORDER — SODIUM CHLORIDE 9 MG/ML
1000 INJECTION, SOLUTION INTRAVENOUS
Refills: 0 | Status: DISCONTINUED | OUTPATIENT
Start: 2023-04-04 | End: 2023-04-05

## 2023-04-04 RX ADMIN — Medication 650 MILLIGRAM(S): at 06:00

## 2023-04-04 RX ADMIN — Medication 650 MILLIGRAM(S): at 04:32

## 2023-04-04 RX ADMIN — Medication 125 MILLIGRAM(S): at 06:48

## 2023-04-04 RX ADMIN — Medication 125 MILLIGRAM(S): at 11:03

## 2023-04-04 RX ADMIN — Medication 10 MILLIGRAM(S): at 12:47

## 2023-04-04 RX ADMIN — Medication 10 MILLIGRAM(S): at 13:30

## 2023-04-04 RX ADMIN — Medication 10 MILLIGRAM(S): at 19:15

## 2023-04-04 RX ADMIN — Medication 125 MILLIGRAM(S): at 00:52

## 2023-04-04 RX ADMIN — Medication 800 MILLIGRAM(S): at 06:48

## 2023-04-04 RX ADMIN — Medication 800 MILLIGRAM(S): at 18:38

## 2023-04-04 RX ADMIN — Medication 10 MILLIGRAM(S): at 18:21

## 2023-04-04 RX ADMIN — ONDANSETRON 4 MILLIGRAM(S): 8 TABLET, FILM COATED ORAL at 18:21

## 2023-04-04 RX ADMIN — Medication 125 MILLIGRAM(S): at 18:22

## 2023-04-04 NOTE — PROGRESS NOTE ADULT - PROBLEM SELECTOR PLAN 4
Patient w/ reported history of UC diagnosed in 2020. Reports she has seen Dr. Snow in the past and takes Mesalamine daily. Patient was last admitted at [facility] last year for possible UC flare - GI saw patient at that time. Per their notes, patient had a normal colonoscopy in 2021 and it was unclear if she had a true diagnosis of UC.   Patient was discharged at that time and was told to stop taking her Mesalamine and f/u with GI outpatient.  - Patient reports she has still been taking her Mesalamine   - Denies bloody stool, denies localization of pain to lower quadrants (which is what she has had in her prior flare)   - holding home med Mesalamine 400 mg (2 pills BID) while inpatient   - F/u outpatient w/ Dr. Snow

## 2023-04-04 NOTE — PROGRESS NOTE ADULT - PROBLEM SELECTOR PLAN 1
Patient presenting with one day of nausea, NBNB vomiting, watery diarrhea and crampy mid abdominal pain. Reports this occurred last night after dinner and woke her up in the middle of the night. Denies recent travel or sick contacts. Denies blood in her stool or vomit.   CTAP showing colitis of sigmoid colon and descending colon. Patient found to have positive C. diff on admission.  - Treat with PO Vancomycin 125 mg q6h until 4/12  - C/w fluid LR 90 cc/hr  - Zofran PRN for symptoms   - on soft diet, advance as tolerated  - monitoring BMP and will replete lytes as necessary i/s/o diarrhea/vomiting

## 2023-04-04 NOTE — PROGRESS NOTE ADULT - PROBLEM SELECTOR PLAN 3
On 4/4 AM found to have WBC of 2.51 (down from 9.59 on admission) and neutrophil count of 1.18 (down from 8.38 on admission).  Likely artifact 2/2 dilution given extreme change.    - f/u pm CBC

## 2023-04-04 NOTE — PROGRESS NOTE ADULT - PROBLEM SELECTOR PLAN 5
F: s/p 2 L NS, will continue with gentle fluid for now pending PO intake   E: replete as needed   N: soft diet  DVT ppx: none needd   GI ppx: Famotidine  Contact precautions for C. diff  Dispo: [facility]

## 2023-04-04 NOTE — PROGRESS NOTE ADULT - ATTENDING COMMENTS
Date of service 4/4/23  PCP: Dr. Adán Camilo; GI Dr. Snow  Pt's  at bedside  32F w UC on mesalamine p/w N/V, watery diarrhea found to have distal colitis on CT A/P - positive for CDI (1st occurrence) and Norovirus - today w mild neutropenia    Pt reports tolerating solid food for lunch - no further N/V. However still having watery stools every 1-2 hours. No BRBPR or melena. Denies fevers but did note some night sweats. Ambulating wo LH/dizziness.    Exam: mild tenderness over umbilicus wo rebound. Abd not distended. Hypoactive BS.     #CDI  #Norovirus  #Mild neutropenia - ANC 1180 this AM.    #UC - on mesalamine. Denies taking steroids, biologics recently    Plan  Overall, transitioned to regular diet and tolerating however BM frequency remains high. Thus will keep on IV fluids at this time. Continue PO Vancomycin 250mg q6h x10d course. Supportive care for norovirus.  f/u CBC with DIFF this evening.  monitor BM frequency and consistency    PPx: Ambulatory  DISPO: Home pending improvement in BM and ANC

## 2023-04-04 NOTE — PROGRESS NOTE ADULT - SUBJECTIVE AND OBJECTIVE BOX
SUBJECTIVE / INTERVAL HPI: Patient seen and examined at bedside.  In no acute distress but continues to have watery diarrhea upon taking any sips of water; endorses lessened nausea and no vomiting.    VITAL SIGNS:  Vital Signs Last 24 Hrs  T(C): 36.9 (04 Apr 2023 05:00), Max: 38.2 (03 Apr 2023 13:09)  T(F): 98.5 (04 Apr 2023 05:00), Max: 100.7 (03 Apr 2023 13:09)  HR: 82 (04 Apr 2023 05:00) (82 - 100)  BP: 91/55 (04 Apr 2023 05:00) (91/55 - 113/72)  BP(mean): 67 (04 Apr 2023 05:00) (67 - 67)  RR: 17 (04 Apr 2023 05:00) (14 - 18)  SpO2: 96% (04 Apr 2023 05:00) (96% - 99%)    Parameters below as of 04 Apr 2023 05:00  Patient On (Oxygen Delivery Method): room air      I&O's Summary    04 Apr 2023 07:01  -  04 Apr 2023 11:22  --------------------------------------------------------  IN: 420 mL / OUT: 0 mL / NET: 420 mL        PHYSICAL EXAM:  Constitutional: walking to restroom, in no distress  HEENT: NC/AT, PERRL, EOMI, anicteric sclera, no nasal discharge; uvula midline, no oropharyngeal erythema or exudates; dry MM   Respiratory: CTA B/L; no W/R/R, no retractions.  Cardiac: +S1/S2; RRR; no M/R/G  Gastrointestinal: soft, NT/ND; no rebound or guarding; +BSx4. Mild discomfort to deep palpation around middle umbilical area.  Extremities: WWP, no clubbing or cyanosis; no peripheral edema  Musculoskeletal: NROM x4; no joint swelling, tenderness or erythema  Vascular: 2+ radial, DP/PT pulses B/L  Dermatologic: skin warm, dry and intact; no rashes, wounds, or scars  Neurologic: AAOx3; no focal deficits  Psychiatric: affect and characteristics of appearance, verbalizations, behaviors are appropriate    MEDICATIONS:  MEDICATIONS  (STANDING):  lactated ringers. 1000 milliLiter(s) (100 mL/Hr) IV Continuous <Continuous>  mesalamine DR Capsule 800 milliGRAM(s) Oral every 12 hours  vancomycin    Solution 125 milliGRAM(s) Oral every 6 hours    MEDICATIONS  (PRN):  acetaminophen     Tablet .. 650 milliGRAM(s) Oral every 6 hours PRN Temp greater or equal to 38C (100.4F), Mild Pain (1 - 3), Moderate Pain (4 - 6)  ketorolac 10 milliGRAM(s) Oral every 6 hours PRN Severe Pain (7 - 10)  ondansetron    Tablet 4 milliGRAM(s) Oral every 6 hours PRN Nausea and/or Vomiting      ALLERGIES:  Allergies    No Known Allergies    Intolerances        LABS:                        12.7   2.51  )-----------( 203      ( 04 Apr 2023 08:41 )             39.2     04-04    137  |  108  |  4<L>  ----------------------------<  92  4.0   |  25  |  0.63    Ca    7.9<L>      04 Apr 2023 08:41  Phos  3.1     04-04  Mg     2.1     04-04    TPro  7.1  /  Alb  4.0  /  TBili  0.3  /  DBili  x   /  AST  26  /  ALT  24  /  AlkPhos  69  04-03      Urinalysis Basic - ( 03 Apr 2023 03:50 )    Color: Yellow / Appearance: Clear / SG: >=1.030 / pH: x  Gluc: x / Ketone: Trace mg/dL  / Bili: Negative / Urobili: 0.2 E.U./dL   Blood: x / Protein: Trace mg/dL / Nitrite: NEGATIVE   Leuk Esterase: NEGATIVE / RBC: < 5 /HPF / WBC 5-10 /HPF   Sq Epi: x / Non Sq Epi: 0-5 /HPF / Bacteria: Present /HPF      CAPILLARY BLOOD GLUCOSE          RADIOLOGY & ADDITIONAL TESTS: Reviewed.   HOSPITAL COURSE:  31 yo F, with PMH of ulcerative colitis (diagnosed around 3 years ago, on Mesalamine, follows with Dr. Snow) presenting with one day of nausea, vomiting, and diarrhea; nonbloody/nonbillous; continues to be unable to tolerate PO intake without experiencing immediate diarrhea watery nonbloody.  Found to be positive for C diff; started on PO vancomycin 125mg q6; on fluids 90 cc/hr.  Found positive for Norovirus.    SUBJECTIVE / INTERVAL HPI: Patient seen and examined at bedside.  In no acute distress but continues to have watery diarrhea upon taking any sips of water; endorses lessened nausea and no vomiting.    VITAL SIGNS:  Vital Signs Last 24 Hrs  T(C): 36.9 (04 Apr 2023 05:00), Max: 38.2 (03 Apr 2023 13:09)  T(F): 98.5 (04 Apr 2023 05:00), Max: 100.7 (03 Apr 2023 13:09)  HR: 82 (04 Apr 2023 05:00) (82 - 100)  BP: 91/55 (04 Apr 2023 05:00) (91/55 - 113/72)  BP(mean): 67 (04 Apr 2023 05:00) (67 - 67)  RR: 17 (04 Apr 2023 05:00) (14 - 18)  SpO2: 96% (04 Apr 2023 05:00) (96% - 99%)    Parameters below as of 04 Apr 2023 05:00  Patient On (Oxygen Delivery Method): room air      I&O's Summary    04 Apr 2023 07:01  -  04 Apr 2023 11:22  --------------------------------------------------------  IN: 420 mL / OUT: 0 mL / NET: 420 mL        PHYSICAL EXAM:  Constitutional: walking to restroom, in no distress  HEENT: NC/AT, PERRL, EOMI, anicteric sclera, no nasal discharge; uvula midline, no oropharyngeal erythema or exudates; dry MM   Respiratory: CTA B/L; no W/R/R, no retractions.  Cardiac: +S1/S2; RRR; no M/R/G  Gastrointestinal: soft, NT/ND; no rebound or guarding; +BSx4. Mild discomfort to deep palpation around middle umbilical area.  Extremities: WWP, no clubbing or cyanosis; no peripheral edema  Musculoskeletal: NROM x4; no joint swelling, tenderness or erythema  Vascular: 2+ radial, DP/PT pulses B/L  Dermatologic: skin warm, dry and intact; no rashes, wounds, or scars  Neurologic: AAOx3; no focal deficits  Psychiatric: affect and characteristics of appearance, verbalizations, behaviors are appropriate    MEDICATIONS:  MEDICATIONS  (STANDING):  lactated ringers. 1000 milliLiter(s) (100 mL/Hr) IV Continuous <Continuous>  mesalamine DR Capsule 800 milliGRAM(s) Oral every 12 hours  vancomycin    Solution 125 milliGRAM(s) Oral every 6 hours    MEDICATIONS  (PRN):  acetaminophen     Tablet .. 650 milliGRAM(s) Oral every 6 hours PRN Temp greater or equal to 38C (100.4F), Mild Pain (1 - 3), Moderate Pain (4 - 6)  ketorolac 10 milliGRAM(s) Oral every 6 hours PRN Severe Pain (7 - 10)  ondansetron    Tablet 4 milliGRAM(s) Oral every 6 hours PRN Nausea and/or Vomiting      ALLERGIES:  Allergies    No Known Allergies    Intolerances        LABS:                        12.7   2.51  )-----------( 203      ( 04 Apr 2023 08:41 )             39.2     04-04    137  |  108  |  4<L>  ----------------------------<  92  4.0   |  25  |  0.63    Ca    7.9<L>      04 Apr 2023 08:41  Phos  3.1     04-04  Mg     2.1     04-04    TPro  7.1  /  Alb  4.0  /  TBili  0.3  /  DBili  x   /  AST  26  /  ALT  24  /  AlkPhos  69  04-03      Urinalysis Basic - ( 03 Apr 2023 03:50 )    Color: Yellow / Appearance: Clear / SG: >=1.030 / pH: x  Gluc: x / Ketone: Trace mg/dL  / Bili: Negative / Urobili: 0.2 E.U./dL   Blood: x / Protein: Trace mg/dL / Nitrite: NEGATIVE   Leuk Esterase: NEGATIVE / RBC: < 5 /HPF / WBC 5-10 /HPF   Sq Epi: x / Non Sq Epi: 0-5 /HPF / Bacteria: Present /HPF      CAPILLARY BLOOD GLUCOSE          RADIOLOGY & ADDITIONAL TESTS: Reviewed.

## 2023-04-05 ENCOUNTER — TRANSCRIPTION ENCOUNTER (OUTPATIENT)
Age: 33
End: 2023-04-05

## 2023-04-05 VITALS
RESPIRATION RATE: 17 BRPM | SYSTOLIC BLOOD PRESSURE: 106 MMHG | DIASTOLIC BLOOD PRESSURE: 72 MMHG | OXYGEN SATURATION: 99 % | HEART RATE: 82 BPM | TEMPERATURE: 98 F

## 2023-04-05 LAB
ALBUMIN SERPL ELPH-MCNC: 3.3 G/DL — SIGNIFICANT CHANGE UP (ref 3.3–5)
ALP SERPL-CCNC: 52 U/L — SIGNIFICANT CHANGE UP (ref 40–120)
ALT FLD-CCNC: 38 U/L — SIGNIFICANT CHANGE UP (ref 10–45)
ANION GAP SERPL CALC-SCNC: 8 MMOL/L — SIGNIFICANT CHANGE UP (ref 5–17)
AST SERPL-CCNC: 42 U/L — HIGH (ref 10–40)
BASOPHILS # BLD AUTO: 0.01 K/UL — SIGNIFICANT CHANGE UP (ref 0–0.2)
BASOPHILS NFR BLD AUTO: 0.4 % — SIGNIFICANT CHANGE UP (ref 0–2)
BILIRUB SERPL-MCNC: 0.2 MG/DL — SIGNIFICANT CHANGE UP (ref 0.2–1.2)
BUN SERPL-MCNC: 3 MG/DL — LOW (ref 7–23)
CALCIUM SERPL-MCNC: 7.7 MG/DL — LOW (ref 8.4–10.5)
CHLORIDE SERPL-SCNC: 108 MMOL/L — SIGNIFICANT CHANGE UP (ref 96–108)
CO2 SERPL-SCNC: 23 MMOL/L — SIGNIFICANT CHANGE UP (ref 22–31)
CREAT SERPL-MCNC: 0.56 MG/DL — SIGNIFICANT CHANGE UP (ref 0.5–1.3)
EGFR: 124 ML/MIN/1.73M2 — SIGNIFICANT CHANGE UP
EOSINOPHIL # BLD AUTO: 0.01 K/UL — SIGNIFICANT CHANGE UP (ref 0–0.5)
EOSINOPHIL NFR BLD AUTO: 0.4 % — SIGNIFICANT CHANGE UP (ref 0–6)
GLUCOSE SERPL-MCNC: 95 MG/DL — SIGNIFICANT CHANGE UP (ref 70–99)
HCT VFR BLD CALC: 39.1 % — SIGNIFICANT CHANGE UP (ref 34.5–45)
HGB BLD-MCNC: 12.8 G/DL — SIGNIFICANT CHANGE UP (ref 11.5–15.5)
IMM GRANULOCYTES NFR BLD AUTO: 0 % — SIGNIFICANT CHANGE UP (ref 0–0.9)
LYMPHOCYTES # BLD AUTO: 1.1 K/UL — SIGNIFICANT CHANGE UP (ref 1–3.3)
LYMPHOCYTES # BLD AUTO: 43.3 % — SIGNIFICANT CHANGE UP (ref 13–44)
MAGNESIUM SERPL-MCNC: 1.8 MG/DL — SIGNIFICANT CHANGE UP (ref 1.6–2.6)
MCHC RBC-ENTMCNC: 29.4 PG — SIGNIFICANT CHANGE UP (ref 27–34)
MCHC RBC-ENTMCNC: 32.7 GM/DL — SIGNIFICANT CHANGE UP (ref 32–36)
MCV RBC AUTO: 89.9 FL — SIGNIFICANT CHANGE UP (ref 80–100)
MONOCYTES # BLD AUTO: 0.3 K/UL — SIGNIFICANT CHANGE UP (ref 0–0.9)
MONOCYTES NFR BLD AUTO: 11.8 % — SIGNIFICANT CHANGE UP (ref 2–14)
NEUTROPHILS # BLD AUTO: 1.12 K/UL — LOW (ref 1.8–7.4)
NEUTROPHILS NFR BLD AUTO: 44.1 % — SIGNIFICANT CHANGE UP (ref 43–77)
NRBC # BLD: 0 /100 WBCS — SIGNIFICANT CHANGE UP (ref 0–0)
PHOSPHATE SERPL-MCNC: 3.6 MG/DL — SIGNIFICANT CHANGE UP (ref 2.5–4.5)
PLATELET # BLD AUTO: 176 K/UL — SIGNIFICANT CHANGE UP (ref 150–400)
POTASSIUM SERPL-MCNC: 3.5 MMOL/L — SIGNIFICANT CHANGE UP (ref 3.5–5.3)
POTASSIUM SERPL-SCNC: 3.5 MMOL/L — SIGNIFICANT CHANGE UP (ref 3.5–5.3)
PROT SERPL-MCNC: 5.6 G/DL — LOW (ref 6–8.3)
RBC # BLD: 4.35 M/UL — SIGNIFICANT CHANGE UP (ref 3.8–5.2)
RBC # FLD: 13.7 % — SIGNIFICANT CHANGE UP (ref 10.3–14.5)
SARS-COV-2 RNA SPEC QL NAA+PROBE: SIGNIFICANT CHANGE UP
SODIUM SERPL-SCNC: 139 MMOL/L — SIGNIFICANT CHANGE UP (ref 135–145)
WBC # BLD: 2.54 K/UL — LOW (ref 3.8–10.5)
WBC # FLD AUTO: 2.54 K/UL — LOW (ref 3.8–10.5)

## 2023-04-05 PROCEDURE — 85027 COMPLETE CBC AUTOMATED: CPT

## 2023-04-05 PROCEDURE — 85652 RBC SED RATE AUTOMATED: CPT

## 2023-04-05 PROCEDURE — 84702 CHORIONIC GONADOTROPIN TEST: CPT

## 2023-04-05 PROCEDURE — 96374 THER/PROPH/DIAG INJ IV PUSH: CPT

## 2023-04-05 PROCEDURE — 99285 EMERGENCY DEPT VISIT HI MDM: CPT | Mod: 25

## 2023-04-05 PROCEDURE — 81001 URINALYSIS AUTO W/SCOPE: CPT

## 2023-04-05 PROCEDURE — 99239 HOSP IP/OBS DSCHRG MGMT >30: CPT | Mod: GC

## 2023-04-05 PROCEDURE — 96375 TX/PRO/DX INJ NEW DRUG ADDON: CPT

## 2023-04-05 PROCEDURE — 74177 CT ABD & PELVIS W/CONTRAST: CPT | Mod: MA

## 2023-04-05 PROCEDURE — 87507 IADNA-DNA/RNA PROBE TQ 12-25: CPT

## 2023-04-05 PROCEDURE — 36415 COLL VENOUS BLD VENIPUNCTURE: CPT

## 2023-04-05 PROCEDURE — 85025 COMPLETE CBC W/AUTO DIFF WBC: CPT

## 2023-04-05 PROCEDURE — 80053 COMPREHEN METABOLIC PANEL: CPT

## 2023-04-05 PROCEDURE — 84100 ASSAY OF PHOSPHORUS: CPT

## 2023-04-05 PROCEDURE — U0003: CPT

## 2023-04-05 PROCEDURE — 83735 ASSAY OF MAGNESIUM: CPT

## 2023-04-05 PROCEDURE — 86140 C-REACTIVE PROTEIN: CPT

## 2023-04-05 PROCEDURE — 87635 SARS-COV-2 COVID-19 AMP PRB: CPT

## 2023-04-05 PROCEDURE — 87449 NOS EACH ORGANISM AG IA: CPT

## 2023-04-05 PROCEDURE — 87324 CLOSTRIDIUM AG IA: CPT

## 2023-04-05 PROCEDURE — 83690 ASSAY OF LIPASE: CPT

## 2023-04-05 PROCEDURE — 80048 BASIC METABOLIC PNL TOTAL CA: CPT

## 2023-04-05 PROCEDURE — U0005: CPT

## 2023-04-05 RX ORDER — POTASSIUM CHLORIDE 20 MEQ
20 PACKET (EA) ORAL
Refills: 0 | Status: COMPLETED | OUTPATIENT
Start: 2023-04-05 | End: 2023-04-05

## 2023-04-05 RX ORDER — MAGNESIUM SULFATE 500 MG/ML
2 VIAL (ML) INJECTION ONCE
Refills: 0 | Status: COMPLETED | OUTPATIENT
Start: 2023-04-05 | End: 2023-04-05

## 2023-04-05 RX ORDER — VANCOMYCIN HCL 1 G
1 VIAL (EA) INTRAVENOUS
Qty: 30 | Refills: 0
Start: 2023-04-05 | End: 2023-04-11

## 2023-04-05 RX ORDER — MESALAMINE 400 MG
800 TABLET, DELAYED RELEASE (ENTERIC COATED) ORAL EVERY 12 HOURS
Refills: 0 | Status: DISCONTINUED | OUTPATIENT
Start: 2023-04-05 | End: 2023-04-05

## 2023-04-05 RX ADMIN — Medication 20 MILLIEQUIVALENT(S): at 12:26

## 2023-04-05 RX ADMIN — Medication 125 MILLIGRAM(S): at 12:25

## 2023-04-05 RX ADMIN — Medication 20 MILLIEQUIVALENT(S): at 11:01

## 2023-04-05 RX ADMIN — Medication 125 MILLIGRAM(S): at 01:02

## 2023-04-05 RX ADMIN — Medication 25 GRAM(S): at 11:01

## 2023-04-05 RX ADMIN — Medication 125 MILLIGRAM(S): at 06:44

## 2023-04-05 NOTE — PROGRESS NOTE ADULT - SUBJECTIVE AND OBJECTIVE BOX
OVERNIGHT EVENTS:    SUBJECTIVE / INTERVAL HPI: Patient seen and examined at bedside.     VITAL SIGNS:  Vital Signs Last 24 Hrs  T(C): 37.3 (05 Apr 2023 05:00), Max: 37.3 (05 Apr 2023 05:00)  T(F): 99.2 (05 Apr 2023 05:00), Max: 99.2 (05 Apr 2023 05:00)  HR: 94 (05 Apr 2023 05:00) (72 - 94)  BP: 103/70 (05 Apr 2023 05:00) (97/63 - 118/71)  BP(mean): --  RR: 16 (05 Apr 2023 05:00) (16 - 18)  SpO2: 97% (05 Apr 2023 05:00) (95% - 99%)    Parameters below as of 05 Apr 2023 05:00  Patient On (Oxygen Delivery Method): room air      I&O's Summary    04 Apr 2023 07:01  -  05 Apr 2023 07:00  --------------------------------------------------------  IN: 420 mL / OUT: 0 mL / NET: 420 mL        PHYSICAL EXAM:    General: NAD  HEENT: NC/AT; PERRL, anicteric sclera; MMM  Neck: supple  Cardiovascular: +S1/S2; RRR  Respiratory: CTA B/L; no W/R/R  Gastrointestinal: soft, NT/ND; +BSx4  Extremities: WWP; no edema, clubbing or cyanosis  Vascular: 2+ radial, DP/PT pulses B/L  Neurological: AAOx3; no focal deficits    MEDICATIONS:  MEDICATIONS  (STANDING):  lactated ringers. 1000 milliLiter(s) (100 mL/Hr) IV Continuous <Continuous>  mesalamine DR Capsule 800 milliGRAM(s) Oral every 12 hours  vancomycin    Solution 125 milliGRAM(s) Oral every 6 hours    MEDICATIONS  (PRN):  acetaminophen     Tablet .. 650 milliGRAM(s) Oral every 6 hours PRN Temp greater or equal to 38C (100.4F), Mild Pain (1 - 3), Moderate Pain (4 - 6)  ketorolac 10 milliGRAM(s) Oral every 6 hours PRN Severe Pain (7 - 10)  ondansetron    Tablet 4 milliGRAM(s) Oral every 6 hours PRN Nausea and/or Vomiting      ALLERGIES:  Allergies    No Known Allergies    Intolerances        LABS:                        12.2   2.14  )-----------( 196      ( 04 Apr 2023 18:22 )             37.3     04-04    139  |  109<H>  |  3<L>  ----------------------------<  103<H>  3.9   |  23  |  0.57    Ca    7.7<L>      04 Apr 2023 18:22  Phos  2.8     04-04  Mg     2.0     04-04          CAPILLARY BLOOD GLUCOSE          RADIOLOGY & ADDITIONAL TESTS: Reviewed.   OVERNIGHT EVENTS:   No acute events. Patient had 3BMs yesterday and overnight.    SUBJECTIVE / INTERVAL HPI: Patient seen and examined at bedside. Patient had no additional episodes of diarrhea today. Patient ate 1/4 of a bagel yesterday and she had a few pieces of cereal this morning. Patient states she has no appetite today. Patient states she has abdominal cramping after she eats. Patient denies fevers, chills, sob, abdominal pain, edema. Remaining ROS are negative.     VITAL SIGNS:  Vital Signs Last 24 Hrs  T(C): 37.3 (05 Apr 2023 05:00), Max: 37.3 (05 Apr 2023 05:00)  T(F): 99.2 (05 Apr 2023 05:00), Max: 99.2 (05 Apr 2023 05:00)  HR: 94 (05 Apr 2023 05:00) (72 - 94)  BP: 103/70 (05 Apr 2023 05:00) (97/63 - 118/71)  BP(mean): --  RR: 16 (05 Apr 2023 05:00) (16 - 18)  SpO2: 97% (05 Apr 2023 05:00) (95% - 99%)    Parameters below as of 05 Apr 2023 05:00  Patient On (Oxygen Delivery Method): room air      I&O's Summary    04 Apr 2023 07:01  -  05 Apr 2023 07:00  --------------------------------------------------------  IN: 420 mL / OUT: 0 mL / NET: 420 mL        PHYSICAL EXAM:    Constitutional: NAD, laying comfortably in bed   HEENT: NC/AT, PERRL, EOMI, anicteric sclera, no nasal discharge; uvula midline, no oropharyngeal erythema or exudates; MMM   Respiratory: CTA B/L; no W/R/R, no retractions.  Cardiac: +S1/S2; RRR; no M/R/G  Gastrointestinal: soft, Non distended, no rebound or guarding; +BSx4. Mild discomfort to deep palpation around middle umbilical area.  Extremities: WWP, no clubbing or cyanosis; no peripheral edema  Musculoskeletal: NROM x4; no joint swelling, tenderness or erythema  Vascular: 2+ radial, DP/PT pulses B/L  Dermatologic: skin warm, dry and intact; no rashes, wounds, or scars  Neurologic: AAOx3; no focal deficits  Psychiatric: affect and characteristics of appearance, verbalizations, behaviors are appropriate      MEDICATIONS:  MEDICATIONS  (STANDING):  lactated ringers. 1000 milliLiter(s) (100 mL/Hr) IV Continuous <Continuous>  mesalamine DR Capsule 800 milliGRAM(s) Oral every 12 hours  vancomycin    Solution 125 milliGRAM(s) Oral every 6 hours    MEDICATIONS  (PRN):  acetaminophen     Tablet .. 650 milliGRAM(s) Oral every 6 hours PRN Temp greater or equal to 38C (100.4F), Mild Pain (1 - 3), Moderate Pain (4 - 6)  ketorolac 10 milliGRAM(s) Oral every 6 hours PRN Severe Pain (7 - 10)  ondansetron    Tablet 4 milliGRAM(s) Oral every 6 hours PRN Nausea and/or Vomiting      ALLERGIES:  Allergies    No Known Allergies    Intolerances        LABS:                        12.2   2.14  )-----------( 196      ( 04 Apr 2023 18:22 )             37.3     04-04    139  |  109<H>  |  3<L>  ----------------------------<  103<H>  3.9   |  23  |  0.57    Ca    7.7<L>      04 Apr 2023 18:22  Phos  2.8     04-04  Mg     2.0     04-04          CAPILLARY BLOOD GLUCOSE          RADIOLOGY & ADDITIONAL TESTS: Reviewed.

## 2023-04-05 NOTE — DISCHARGE NOTE PROVIDER - HOSPITAL COURSE
31 yo F, with PMH of ulcerative colitis (diagnosed around 3 years ago, on Mesalamine, follows with Dr. Snow) presenting with one day of nausea, vomiting, and diarrhea; nonbloody/nonbillous; continues to be unable to tolerate PO intake without experiencing immediate diarrhea watery nonbloody.  Found to be positive for C diff; treated with PO vancomycin 125mg q6.  Found positive for Norovirus, continued supportive treatment.  Problem List/Main Diagnoses (system-based):   Inpatient treatment course:   #Clostridium difficile colitis.  Patient presenting with one day of nausea, NBNB vomiting, watery diarrhea and crampy mid abdominal pain. Reports this occurred last night after dinner and woke her up in the middle of the night. Denies recent travel or sick contacts. Denies blood in her stool or vomit. CTAP showing colitis of sigmoid colon and descending colon. Patient found to have positive C. diff on admission. Treating with PO Vancomycin 125 mg q6h until 4/12. Treated with Zofran PRN for symptoms Patient has decreased appetite, but tolerating regular diet. No additional diarrhea episodes today. Monitor BMP and repleted lytes as necessary i/s/o diarrhea/vomiting.     #Norovirus.  GI PCR positive for Norovirus. Continued with Supportive care .Continued with IV hydration, encourage PO intake Repleted electrolytes as needed.    #Neutropenia.  On 4/4 AM found to have WBC of 2.51 (down from 9.59 on admission) and neutrophil count of 1.18 (down from 8.38 on admission).  Repeat CBC WBC 2.54  trend WBC with outpatient CBC, follow up with PCP.    #Ulcerative colitis. Patient w/ reported history of UC diagnosed in 2020. Reports she has seen Dr. Snow in the past and takes Mesalamine daily. Patient was last admitted at [facility] last year for possible UC flare - GI saw patient at that time. Per their notes, patient had a normal colonoscopy in 2021 and it was unclear if she had a true diagnosis of UC.   Patient was discharged at that time and was told to stop taking her Mesalamine and f/u with GI outpatient. Patient reports she has still been taking her Mesalamine  Denies bloody stool, denies localization of pain to lower quadrants (which is what she has had in her prior flare)  Continued home med Mesalamine 400 mg (2 pills BID) while inpatient. F/u outpatient w/ Dr. Snow.  New medications: PO Vancomycin 125 mg q6h until 4/12  Labs to be followed outpatient: CBC for NEUTROPENIA   Exam to be followed outpatient:   Constitutional: NAD, laying comfortably in bed   HEENT: NC/AT, PERRL, EOMI, anicteric sclera, no nasal discharge; uvula midline, no oropharyngeal erythema or exudates; MMM   Respiratory: CTA B/L; no W/R/R, no retractions.  Cardiac: +S1/S2; RRR; no M/R/G  Gastrointestinal: soft, Non distended, no rebound or guarding; +BSx4. Mild discomfort to deep palpation around middle umbilical area.  Extremities: WWP, no clubbing or cyanosis; no peripheral edema  Musculoskeletal: NROM x4; no joint swelling, tenderness or erythema  Vascular: 2+ radial, DP/PT pulses B/L  Dermatologic: skin warm, dry and intact; no rashes, wounds, or scars  Neurologic: AAOx3; no focal deficits  Psychiatric: affect and characteristics of appearance, verbalizations, behaviors are appropriate

## 2023-04-05 NOTE — PROGRESS NOTE ADULT - PROBLEM SELECTOR PLAN 1
Patient presenting with one day of nausea, NBNB vomiting, watery diarrhea and crampy mid abdominal pain. Reports this occurred last night after dinner and woke her up in the middle of the night. Denies recent travel or sick contacts. Denies blood in her stool or vomit.   CTAP showing colitis of sigmoid colon and descending colon. Patient found to have positive C. diff on admission.  - Treat with PO Vancomycin 125 mg q6h until 4/12  - C/w fluid LR 90 cc/hr  - Zofran PRN for symptoms   - on soft diet, advance as tolerated  - monitoring BMP and will replete lytes as necessary i/s/o diarrhea/vomiting Patient presenting with one day of nausea, NBNB vomiting, watery diarrhea and crampy mid abdominal pain. Reports this occurred last night after dinner and woke her up in the middle of the night. Denies recent travel or sick contacts. Denies blood in her stool or vomit.   CTAP showing colitis of sigmoid colon and descending colon. Patient found to have positive C. diff on admission.  - Treat with PO Vancomycin 125 mg q6h until 4/12  - C/w fluid LR 90 cc/hr  - Zofran PRN for symptoms   - on regular diet  - monitoring BMP and will replete lytes as necessary i/s/o diarrhea/vomiting Patient presenting with one day of nausea, NBNB vomiting, watery diarrhea and crampy mid abdominal pain. Reports this occurred last night after dinner and woke her up in the middle of the night. Denies recent travel or sick contacts. Denies blood in her stool or vomit.   CTAP showing colitis of sigmoid colon and descending colon. Patient found to have positive C. diff on admission.  - Treat with PO Vancomycin 125 mg q6h until 4/12  - Zofran PRN for symptoms   - on regular diet  - monitoring BMP and will replete lytes as necessary i/s/o diarrhea/vomiting

## 2023-04-05 NOTE — DISCHARGE NOTE NURSING/CASE MANAGEMENT/SOCIAL WORK - NSDCPEFALRISK_GEN_ALL_CORE
For information on Fall & Injury Prevention, visit: [email or web address] OR  [email or web address] OR  https://www.cdc.gov/steadi/patient.html

## 2023-04-05 NOTE — DISCHARGE NOTE PROVIDER - NSDCMRMEDTOKEN_GEN_ALL_CORE_FT
mesalamine 400 mg oral delayed release tablet: 2 tab(s) orally 2 times a day  vancomycin 125 mg oral capsule: 1 cap(s) orally 4 times a day

## 2023-04-05 NOTE — DISCHARGE NOTE PROVIDER - NSDCCPCAREPLAN_GEN_ALL_CORE_FT
PRINCIPAL DISCHARGE DIAGNOSIS  Diagnosis: Clostridium difficile colitis  Assessment and Plan of Treatment: You were found to have C. Difficile colitis. This is nflammation of the colon caused by the bacteria Clostridium difficile and causes diarrhea. Clostridium difficile colitis results from disruption of normal healthy bacteria in the colon, often from antibiotics. C. difficile can also be transmitted from person to person by spores can be present on people's hands. Spores are killed by washing hands with soap and water. C. diff can cause severe damage to the colon and even be fatal. Symptoms include diarrhea, belly pain, and fever. Treatment includes antibiotics. Even when treated with antibiotics, the infection may come back. If you notice continued and/or worsening diarrhea, please go to an emergency room. Please follow up with your primary care provider once you have finished your course of antibiotics.     Please continue taking the following medications:   Vancomycin 125mg one tablet by mouth four times a day until 4/11/23  Please follow up with your GI doctor, Dr. Snow within 2 weeks of discharge.        SECONDARY DISCHARGE DIAGNOSES  Diagnosis: Neutropenia  Assessment and Plan of Treatment: You were found to have a low white blood cell count during this admission. Please follow up with your PCP to monitor your WBC count.    Diagnosis: Norovirus  Assessment and Plan of Treatment: You were found to have norovirus this visit. We continued with supportive care. Please continue with oral hydration.

## 2023-04-05 NOTE — PROGRESS NOTE ADULT - PROBLEM SELECTOR PLAN 2
GI PCR positive for Norovirus.  - Supportive care   - Continue with IV hydration, encourage PO intake   - Replete electrolytes as needed
GI PCR positive for Norovirus.  - Supportive care   - Continue with IV hydration, encourage PO intake   - Replete electrolytes as needed

## 2023-04-05 NOTE — PROGRESS NOTE ADULT - ASSESSMENT
Patient is a 33 yo F, with PMH of ulcerative colitis (diagnosed around 3 years ago, on Mesalamine, follows with Dr. Snow) presenting with one day of nausea, vomiting, and diarrhea, found to have colitis on CTAP and positive for C. diff. Admitted for further management.
Patient is a 33 yo F, with PMH of ulcerative colitis (diagnosed around 3 years ago, on Mesalamine, follows with Dr. Snow) presenting with one day of nausea, vomiting, and diarrhea, found to have colitis on CTAP and positive for C. diff. Admitted for further management.

## 2023-04-05 NOTE — DISCHARGE NOTE PROVIDER - PROVIDER TOKENS
PROVIDER:[TOKEN:[[id number]],SCHEDULEDAPPT:[04/21/2023],SCHEDULEDAPPTTIME:[12:00 PM]],PROVIDER:[TOKEN:[[id number]],FOLLOWUP:[2 weeks]] PROVIDER:[TOKEN:[[id number]],SCHEDULEDAPPT:[04/21/2023],SCHEDULEDAPPTTIME:[12:00 PM]],PROVIDER:[TOKEN:[[id number]],SCHEDULEDAPPT:[05/30/2023],SCHEDULEDAPPTTIME:[08:00 AM]]

## 2023-04-05 NOTE — PROGRESS NOTE ADULT - PROBLEM SELECTOR PLAN 5
F: s/p 2 L NS, will continue with gentle fluid for now pending PO intake   E: replete as needed   N: soft diet  DVT ppx: none needd   GI ppx: Famotidine  Contact precautions for C. diff  Dispo: [facility] F: s/p 2 L NS   E: replete as needed   N: regular diet  DVT ppx: none needd   GI ppx: Famotidine  Contact precautions for C. diff  Dispo: [facility]

## 2023-04-05 NOTE — PROGRESS NOTE ADULT - PROBLEM SELECTOR PLAN 3
On 4/4 AM found to have WBC of 2.51 (down from 9.59 on admission) and neutrophil count of 1.18 (down from 8.38 on admission).  Likely artifact 2/2 dilution given extreme change.    - f/u pm CBC On 4/4 AM found to have WBC of 2.51 (down from 9.59 on admission) and neutrophil count of 1.18 (down from 8.38 on admission).  Repeat CBC   -outpatient CBC, follow up with PCP On 4/4 AM found to have WBC of 2.51 (down from 9.59 on admission) and neutrophil count of 1.18 (down from 8.38 on admission).  Repeat CBC WBC 2.54  -Continue to trend  -outpatient CBC, follow up with PCP

## 2023-04-05 NOTE — DISCHARGE NOTE NURSING/CASE MANAGEMENT/SOCIAL WORK - PATIENT PORTAL LINK FT
You can access the FollowMyHealth Patient Portal offered by [facility] by registering at the following website: [email or web address]. By joining [employer]’s FollowMyHealth portal, you will also be able to view your health information using other applications (apps) compatible with our system.

## 2023-04-05 NOTE — DISCHARGE NOTE PROVIDER - CARE PROVIDER_API CALL
Pedro Camilo)  Critical Care Medicine; Internal Medicine; Pulmonary Disease  [address], [address] [place] [address]  Phone: [phone]  Fax: [phone]  Scheduled Appointment: 04/21/2023 12:00 PM    Irvin Sonw)  Gastroenterology  [address] [place] [address]  Phone: [phone]  Fax: [phone]  Follow Up Time: 2 weeks   Pedro Camilo)  Critical Care Medicine; Internal Medicine; Pulmonary Disease  [address] [place] [address]  Phone: [phone]  Fax: [phone]  Scheduled Appointment: 04/21/2023 12:00 PM    Irvin Snow)  Gastroenterology  [address] [place] [address]  Phone: [phone]  Fax: [phone]  Scheduled Appointment: 05/30/2023 08:00 AM

## 2023-04-05 NOTE — PROGRESS NOTE ADULT - PROBLEM SELECTOR PLAN 4
Patient w/ reported history of UC diagnosed in 2020. Reports she has seen Dr. Snow in the past and takes Mesalamine daily. Patient was last admitted at [facility] last year for possible UC flare - GI saw patient at that time. Per their notes, patient had a normal colonoscopy in 2021 and it was unclear if she had a true diagnosis of UC.   Patient was discharged at that time and was told to stop taking her Mesalamine and f/u with GI outpatient.  - Patient reports she has still been taking her Mesalamine   - Denies bloody stool, denies localization of pain to lower quadrants (which is what she has had in her prior flare)   - holding home med Mesalamine 400 mg (2 pills BID) while inpatient   - F/u outpatient w/ Dr. Snow Patient w/ reported history of UC diagnosed in 2020. Reports she has seen Dr. Snow in the past and takes Mesalamine daily. Patient was last admitted at [facility] last year for possible UC flare - GI saw patient at that time. Per their notes, patient had a normal colonoscopy in 2021 and it was unclear if she had a true diagnosis of UC.   Patient was discharged at that time and was told to stop taking her Mesalamine and f/u with GI outpatient.  - Patient reports she has still been taking her Mesalamine   - Denies bloody stool, denies localization of pain to lower quadrants (which is what she has had in her prior flare)   - continued home med Mesalamine 400 mg (2 pills BID) while inpatient   - F/u outpatient w/ Dr. Snow

## 2023-04-06 ENCOUNTER — TRANSCRIPTION ENCOUNTER (OUTPATIENT)
Age: 33
End: 2023-04-06

## 2023-04-16 DIAGNOSIS — E83.51 HYPOCALCEMIA: ICD-10-CM

## 2023-04-16 DIAGNOSIS — A08.11 ACUTE GASTROENTEROPATHY DUE TO NORWALK AGENT: ICD-10-CM

## 2023-04-16 DIAGNOSIS — D70.9 NEUTROPENIA, UNSPECIFIED: ICD-10-CM

## 2023-04-16 DIAGNOSIS — K51.90 ULCERATIVE COLITIS, UNSPECIFIED, WITHOUT COMPLICATIONS: ICD-10-CM

## 2023-04-16 DIAGNOSIS — J45.909 UNSPECIFIED ASTHMA, UNCOMPLICATED: ICD-10-CM

## 2023-04-16 DIAGNOSIS — A04.72 ENTEROCOLITIS DUE TO CLOSTRIDIUM DIFFICILE, NOT SPECIFIED AS RECURRENT: ICD-10-CM

## 2023-04-21 ENCOUNTER — APPOINTMENT (OUTPATIENT)
Dept: PULMONOLOGY | Facility: CLINIC | Age: 33
End: 2023-04-21
Payer: COMMERCIAL

## 2023-04-21 VITALS
WEIGHT: 112 LBS | SYSTOLIC BLOOD PRESSURE: 110 MMHG | DIASTOLIC BLOOD PRESSURE: 70 MMHG | OXYGEN SATURATION: 99 % | TEMPERATURE: 97.3 F | HEART RATE: 93 BPM | BODY MASS INDEX: 21.16 KG/M2

## 2023-04-21 DIAGNOSIS — Z87.898 PERSONAL HISTORY OF OTHER SPECIFIED CONDITIONS: ICD-10-CM

## 2023-04-21 DIAGNOSIS — D64.9 ANEMIA, UNSPECIFIED: ICD-10-CM

## 2023-04-21 DIAGNOSIS — Z87.09 PERSONAL HISTORY OF OTHER DISEASES OF THE RESPIRATORY SYSTEM: ICD-10-CM

## 2023-04-21 DIAGNOSIS — D70.9 NEUTROPENIA, UNSPECIFIED: ICD-10-CM

## 2023-04-21 PROCEDURE — 36415 COLL VENOUS BLD VENIPUNCTURE: CPT

## 2023-04-21 PROCEDURE — 99213 OFFICE O/P EST LOW 20 MIN: CPT | Mod: 25

## 2023-04-21 RX ORDER — AZITHROMYCIN 250 MG/1
250 TABLET, FILM COATED ORAL
Qty: 1 | Refills: 1 | Status: DISCONTINUED | COMMUNITY
Start: 2022-08-24 | End: 2023-04-21

## 2023-04-21 RX ORDER — METHYLPREDNISOLONE 4 MG/1
4 TABLET ORAL
Qty: 1 | Refills: 1 | Status: DISCONTINUED | COMMUNITY
Start: 2022-08-24 | End: 2023-04-21

## 2023-04-21 NOTE — REVIEW OF SYSTEMS
[Fever] : no fever [Fatigue] : no fatigue [Sinus Problems] : no sinus problems [Cough] : no cough [Dyspnea] : no dyspnea [Wheezing] : no wheezing [Chest Discomfort] : no chest discomfort [Edema] : no edema [Nasal Discharge] : no nasal discharge [Diarrhea] : no diarrhea [Myalgias] : no myalgias [Rash] : no rash [Anemia] : no anemia [Headache] : no headache [Depression] : no depression [Diabetes] : no diabetes [Thyroid Problem] : no thyroid problem

## 2023-04-21 NOTE — PHYSICAL EXAM
[No Acute Distress] : no acute distress [Normal S1, S2] : normal s1, s2 [Clear to Auscultation Bilaterally] : clear to auscultation bilaterally [Soft] : soft [No HSM] : no hsm [Normal Gait] : normal gait [No Cyanosis] : no cyanosis [No Edema] : no edema [No Focal Deficits] : no focal deficits [Oriented x3] : oriented x3 [Normal Rate/Rhythm] : normal rate/rhythm [No Resp Distress] : no resp distress [Not Tender] : not tender [Normal Turgor] : normal turgor [Normal Affect] : normal affect

## 2023-04-21 NOTE — HISTORY OF PRESENT ILLNESS
[Never] : never [TextBox_4] : Was hospitalized with C diff.  She was not taking antibiotics prior to this.  She works as a nurse in an ICU.  She was treated with vancomycin. She was noted to be leukopenic.  She was on mesalamine.  Her symptoms have improved. [Awakes Unrefreshed] : does not awaken unrefreshed [Difficulty Maintaining Sleep] : does not have difficulty maintaining sleep [Recent  Weight Gain] : no recent weight gain

## 2023-04-21 NOTE — DISCUSSION/SUMMARY
[FreeTextEntry1] : She is a 32 year old woman with history of asthma and inflammatory bowel disease. . \par \par \par Impression: \par Was hospitalized with C diff.  She was not taking antibiotics prior to this.  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 She was noted to be leukopenic.  She was on mesalamine which is on hold for now. \par \par Plan: \par Blood work repeated\par Follow up with GI \par - sees Dr. Snow

## 2023-04-22 LAB
ALBUMIN SERPL ELPH-MCNC: 4.4 G/DL
ALP BLD-CCNC: 71 U/L
ALT SERPL-CCNC: 27 U/L
ANION GAP SERPL CALC-SCNC: 14 MMOL/L
AST SERPL-CCNC: 26 U/L
BASOPHILS # BLD AUTO: 0.02 K/UL
BASOPHILS NFR BLD AUTO: 0.5 %
BILIRUB SERPL-MCNC: 0.3 MG/DL
BUN SERPL-MCNC: 9 MG/DL
CALCIUM SERPL-MCNC: 9.7 MG/DL
CHLORIDE SERPL-SCNC: 104 MMOL/L
CO2 SERPL-SCNC: 22 MMOL/L
CREAT SERPL-MCNC: 0.64 MG/DL
EGFR: 120 ML/MIN/1.73M2
EOSINOPHIL # BLD AUTO: 0 K/UL
EOSINOPHIL # BLD MANUAL: 0 /UL
EOSINOPHIL NFR BLD AUTO: 0 %
GLUCOSE SERPL-MCNC: 98 MG/DL
HCT VFR BLD CALC: 44.1 %
HGB BLD-MCNC: 14.3 G/DL
IMM GRANULOCYTES NFR BLD AUTO: 0.3 %
LYMPHOCYTES # BLD AUTO: 1.54 K/UL
LYMPHOCYTES NFR BLD AUTO: 40 %
MAN DIFF?: NORMAL
MCHC RBC-ENTMCNC: 28.8 PG
MCHC RBC-ENTMCNC: 32.4 GM/DL
MCV RBC AUTO: 88.7 FL
MONOCYTES # BLD AUTO: 0.33 K/UL
MONOCYTES NFR BLD AUTO: 8.6 %
NEUTROPHILS # BLD AUTO: 1.95 K/UL
NEUTROPHILS NFR BLD AUTO: 50.6 %
PLATELET # BLD AUTO: 301 K/UL
POTASSIUM SERPL-SCNC: 4.9 MMOL/L
PROT SERPL-MCNC: 7.6 G/DL
RBC # BLD: 4.97 M/UL
RBC # FLD: 13.3 %
SODIUM SERPL-SCNC: 140 MMOL/L
WBC # FLD AUTO: 3.85 K/UL

## 2023-05-01 LAB — T CRUZI AB SER-ACNC: 0.2 IV

## 2023-05-04 ENCOUNTER — APPOINTMENT (OUTPATIENT)
Age: 33
End: 2023-05-04
Payer: COMMERCIAL

## 2023-05-04 VITALS
OXYGEN SATURATION: 98 % | SYSTOLIC BLOOD PRESSURE: 122 MMHG | TEMPERATURE: 97 F | BODY MASS INDEX: 20.77 KG/M2 | HEART RATE: 89 BPM | HEIGHT: 61 IN | WEIGHT: 110 LBS | DIASTOLIC BLOOD PRESSURE: 82 MMHG

## 2023-05-04 DIAGNOSIS — K51.90 ULCERATIVE COLITIS, UNSPECIFIED, W/OUT COMPLICATIONS: ICD-10-CM

## 2023-05-04 PROCEDURE — 99214 OFFICE O/P EST MOD 30 MIN: CPT

## 2023-05-05 RX ORDER — MESALAMINE 1.2 G/1
1.2 TABLET, DELAYED RELEASE ORAL
Qty: 120 | Refills: 3 | Status: ACTIVE | COMMUNITY
Start: 2023-05-05 | End: 1900-01-01

## 2023-05-09 NOTE — PHYSICAL EXAM
[Alert] : alert [Normal Voice/Communication] : normal voice/communication [Healthy Appearing] : healthy appearing [No Acute Distress] : no acute distress [Hearing Threshold Finger Rub Not Early] : hearing was normal [Sclera] : the sclera and conjunctiva were normal [Normal Lips/Gums] : the lips and gums were normal [Oropharynx] : the oropharynx was normal [Normal Appearance] : the appearance of the neck was normal [No Neck Mass] : no neck mass was observed [No Respiratory Distress] : no respiratory distress [No Acc Muscle Use] : no accessory muscle use [Respiration, Rhythm And Depth] : normal respiratory rhythm and effort [Auscultation Breath Sounds / Voice Sounds] : lungs were clear to auscultation bilaterally [Heart Rate And Rhythm] : heart rate was normal and rhythm regular [Normal S1, S2] : normal S1 and S2 [Murmurs] : no murmurs [Bowel Sounds] : normal bowel sounds [Abdomen Tenderness] : non-tender [No Masses] : no abdominal mass palpated [Abdomen Soft] : soft [Oriented To Time, Place, And Person] : oriented to person, place, and time [] : no hepatosplenomegaly [de-identified] : female chaperone present

## 2023-05-09 NOTE — ASSESSMENT
[FreeTextEntry1] : This is a 32 year old female here for post hospital discharge follow up. PMH of UC (pancolitis) was Lialda and Budesonide. Denies a family history of colon CA, gastric CA, high risk polyps, Inflammatory and autoimmune disease. Currently only with 4.8g Lialda.  Patient was admitted at [facility] with abdominal pain, bloody BMs and found to have Cdiff and norovirus with  colitis. She was discharged with Vanco. Currently she is having 2-3 soft stools. No blood or dark tarry stools.\par \par Patient with what looked like UC by biopsy in 2020. Had a double ASCA + which fits more with crohn's but endoscopically fit UC pattern and look, Recent CDiff and norovirus\par My plan\par c/w 4.8g of Lialda\par f/u in 4 months\par copy of medical records provided for her second opinion at [facility]

## 2023-05-09 NOTE — HISTORY OF PRESENT ILLNESS
[FreeTextEntry1] : This is a 32 year old female here for post hospital discharge follow up. PMH of UC (pancolitis) was on Lialda and Budesonide. Denies a family history of colon CA, gastric CA, high risk polyps, Inflammatory and autoimmune disease. Currently only with 4.8g Lialda.  Patient was admitted at [facility] with abdominal pain, bloody BMs and found to have Cdiff and norovirus with  colitis. She was discharged with Vanco. Currently she is having 2-3 soft stools. No blood or dark tarry stools. Not on any PPI. She is CT ICU RN.

## 2023-05-15 DIAGNOSIS — Z01.818 ENCOUNTER FOR OTHER PREPROCEDURAL EXAMINATION: ICD-10-CM

## 2023-05-15 DIAGNOSIS — Z11.59 ENCOUNTER FOR SCREENING FOR OTHER VIRAL DISEASES: ICD-10-CM

## 2023-05-17 ENCOUNTER — APPOINTMENT (OUTPATIENT)
Dept: GASTROENTEROLOGY | Facility: CLINIC | Age: 33
End: 2023-05-17
Payer: COMMERCIAL

## 2023-05-17 VITALS
WEIGHT: 112 LBS | OXYGEN SATURATION: 97 % | RESPIRATION RATE: 16 BRPM | DIASTOLIC BLOOD PRESSURE: 80 MMHG | TEMPERATURE: 97.3 F | HEART RATE: 91 BPM | SYSTOLIC BLOOD PRESSURE: 112 MMHG | BODY MASS INDEX: 21.14 KG/M2 | HEIGHT: 61 IN

## 2023-05-17 DIAGNOSIS — K21.9 GASTRO-ESOPHAGEAL REFLUX DISEASE W/OUT ESOPHAGITIS: ICD-10-CM

## 2023-05-17 DIAGNOSIS — K52.9 NONINFECTIVE GASTROENTERITIS AND COLITIS, UNSPECIFIED: ICD-10-CM

## 2023-05-17 PROCEDURE — 99204 OFFICE O/P NEW MOD 45 MIN: CPT

## 2023-05-17 RX ORDER — FAMOTIDINE 40 MG/1
40 TABLET, FILM COATED ORAL
Qty: 90 | Refills: 0 | Status: ACTIVE | COMMUNITY
Start: 2023-05-17 | End: 1900-01-01

## 2023-05-17 NOTE — PHYSICAL EXAM
[Alert] : alert [Normal Voice/Communication] : normal voice/communication [Healthy Appearing] : healthy appearing [No Acute Distress] : no acute distress [Sclera] : the sclera and conjunctiva were normal [Normal Lips/Gums] : the lips and gums were normal [Hearing Threshold Finger Rub Not Miller] : hearing was normal [Oropharynx] : the oropharynx was normal [Normal Appearance] : the appearance of the neck was normal [No Neck Mass] : no neck mass was observed [No Respiratory Distress] : no respiratory distress [No Acc Muscle Use] : no accessory muscle use [Respiration, Rhythm And Depth] : normal respiratory rhythm and effort [Abdomen Tenderness] : non-tender [No Masses] : no abdominal mass palpated [Abdomen Soft] : soft [Cervical Lymph Nodes Enlarged Posterior Bilaterally] : no posterior cervical lymphadenopathy [Supraclavicular Lymph Nodes Enlarged Bilaterally] : no supraclavicular lymphadenopathy [Axillary Lymph Nodes Enlarged Bilaterally] : no axillary lymphadenopathy [No CVA Tenderness] : no CVA  tenderness [No Spinal Tenderness] : no spinal tenderness [Abnormal Walk] : normal gait [No Clubbing, Cyanosis] : no clubbing or cyanosis of the fingernails [No Joint Swelling] : no joint swelling seen [Normal Color / Pigmentation] : normal skin color and pigmentation [] : no rash [Normal Turgor] : normal skin turgor [Cranial Nerves Intact] : cranial nerves 2-12 were intact [Sensation] : the sensory exam was normal to light touch and pinprick [No Focal Deficits] : no focal deficits [Motor Exam] : the motor exam was normal [Oriented To Time, Place, And Person] : oriented to person, place, and time

## 2023-05-20 NOTE — CONSULT LETTER
[Dear  ___] : Dear  [unfilled], [Consult Letter:] : I had the pleasure of evaluating your patient, [unfilled]. [Please see my note below.] : Please see my note below. [Consult Closing:] : Thank you very much for allowing me to participate in the care of this patient.  If you have any questions, please do not hesitate to contact me. [Sincerely,] : Sincerely, [FreeTextEntry3] : Keon Chappell MD\par Professor of Medicine\par Chief of GI\par Director IBD Program\par [facility]\par

## 2023-05-20 NOTE — REVIEW OF SYSTEMS
[Negative] : Heme/Lymph [Diarrhea] : diarrhea [Heartburn] : heartburn [Bloating (gassiness)] : bloating [As Noted in HPI] : as noted in HPI [Joint Swelling] : joint swelling [Abdominal Pain] : no abdominal pain [Vomiting] : no vomiting [Constipation] : no constipation [Melena (black stool)] : no melena [Fecal Incontinence (soiling)] : no fecal incontinence [Bleeding] : no bleeding [FreeTextEntry9] : knee pain

## 2023-05-20 NOTE — HISTORY OF PRESENT ILLNESS
[FreeTextEntry1] : HPI- 32F ulcerative pancolitis (on Lialda) presenting as a referral from Dr Irvin Snow for second opinion on diagnosis of IBD.\par \par Patient notes her history dates back to 2020 when she first began experiencing symptoms of chills, abdominal pain. Had a CT A/P (3/12/20) then, revealing wall thickening of the ascending, transverse, descending and sigmoid colon with surrounding inflammatory change consistent with pancolitis. There is no bowel obstruction. 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 Underwent a colonoscopy (3/12/20) with Dr Irvin Snow, pathology from ICV, AC, TC, Sigmoid colon, rectum demonstrating colonic mucosa with preserved crypt architecture, marked acute and chronic inflammation with lymphoid follicles and crypt abscesses, no dysplasia or granulomas identified, with changes noted to be compatible with an active phase of IBD. \par \par Following this scope, had been placed initially on Budesonide, Lialda 4.8 g daily and Mesalamine enemas PRN. 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 \par \par 5/20/22 - 5/21/22: Presented with symptoms initially of nausea, progressing to nausea/vomiting and several episodes of dark stools. Also noting concurrent b/l knee pain, lumbar pain for which she had taken NSAIDs at home. CRP elevated @ 21.6 on this admission with CT A/P revealing inflammation of ascending and transverse colon, not typical pattern of distribution seen with UC. Unclear if with true underlying diagnosis of UC or if previous strongyloides infection resulted in abnormal histologic findings on 2020 colonoscopy or if current presentation related solely to infectious colitis (GI PCR + norovirus)\par \par [facility] Admission (April 3-5 2023) - Presented with nausea/vomiting diarrhea, found to be Cdiff positive, treated with PO vancomycin 125 mg q6h. Also positive for Norovirus. \par \par Currently notes 2-3 loose, non-bloody BMs/day. Feels a sense of urgency to go the bathroom. No associated abdominal pain, fevers, chills, nausea/vomiting. \par \par ROS + bloating, GERD.\par \par Previous BW/STOOL STUDIES: \par \par Hep B S Ab REACTIVE\par Hep B s Ag NON-REACTIVE\par Quantiferon TB NEGATIVE\par O&P (3/28/21) - moderate yeast like cells, no parasites noted\par Giardia antigen (3/10/21) negative\par Gi PCR & Cdiff (3/10/21) negative\par CRP 5.14 (3/13/20) --> 21.6 (5/19/22, + norovirus) --> < 3 (4/3/23, + Cdiff & norovirus) \par Fecal calprotectin 745 (3/13/20)\par \par Referred by - Dr Irvin Snow\par \par PMHx - UC, asthma\par PSHx - No prior\par Rx - Lialda\par A/C or NSAIDs? - \par FMHx - Granmother (paternal)- CRC, diagnosed in late 60s, no family history of IBD\par Allergies - singulair\par EtOH - social, overall avoids \par Smoking - never smoker\par Drugs - denies\par \par Imaging - \par CT A/P (4/3/23): Mild sigmoid colonic and rectal wall enhancement; possible distal colitis. No SBO.\par CT A/P (5/19/22): mild colonic wall thickening and slightly increased mucosal enhancement, more conspicuous along the ascending and transverse colon. No bowel obstruction. No free air. Appendix is normal.\par CT A/P (3/12/20) then, revealing wall thickening of the ascending, transverse, descending and sigmoid colon with surrounding inflammatory change consistent with pancolitis. There is no bowel obstruction.\par \par EGD - 5/20/22 - antral non-erosive gastritis (PATH: moderate gastritis, predominantly chronic, mildly active, most prominent in mucosa c/w transition from oxyntic to antral type), normal esophagus/duodenum \par \par Colonoscopy (3/12/20) with Dr Irvin Snow, NO REPORT FOR REVIEW but pathology from ICV, AC, TC, Sigmoid colon, rectum demonstrating colonic mucosa with preserved crypt architecture, marked acute and chronic inflammation with lymphoid follicles and crypt abscesses, no dysplasia or granulomas identified, with changes noted to be compatible with an active phase of IBD. \par \par Colonoscopy (2/17/21; Dr Irvin Snow): grossly normal mucosa throughout (path, random biopsies of right colon, transverse, descending and sigmoid colon: Colonic mucosa with preserved crypt architecture. No acute or chronic epithelial damage is identified)\par \par

## 2023-05-20 NOTE — ASSESSMENT
[FreeTextEntry1] : 32F chronic pancolitis (on Lialda, no prior biologic exposure), in histologic remission on colonoscopy (2021) presenting as a referral from Dr Irvin Snow for second opinion on diagnosis of IBD. Personally reviewed prior records. \par \par #Indeterminate Colitis\par Initial dx of UC pancolitis dating back to 3/2020, where colonoscopy noting acute and chronic inflammation throughout entire colon with repeat scope in 2021 demonstrating histologic remission, while on Lialda. No prior biologic exposure. Prior radiographic evaluations with no noted small bowel disease however no formal CTE, VCE or endoscopic/histologic eval of TI to definitively exclude small bowel involvement. More recent imaging studies showing bowel inflammation not consistent with UC picture but also in the setting of multiple previous infections (i.e. Cdiff, norovirus), most recent of which was April 2023. Noting more softer stools with some tenesmus after Cdiff episode, unclear if related to underlying IBD vs post-infectious IBS symptoms. \par -c/w Lialda, can dec dose from 4 tablets to 2 tablets daily. If notes feeling worse while on lower dose, recommended going back up to 4 tablets daily\par -Discussed role for VCE to evaluate for e/o small bowel disease, R/B/A/L discussed, all questions answered \par -BMP 2x/year to re-evaluate kidney function while on Mesalamine \par -Ordered for fecal calprotectin, if abnormal, can consider for repeat colonoscopy to re-evaluate luminal disease \par - Request collateral from Dr Irvin Snow re: colonoscopy report from March 2020 (initial colonoscopy exam) \par \par #Heartburn\par -Rx for Famotidine for symptoms of reflux\par \par RTC after VCE\par \par Christina Stahl DO\par Gastroenterology Fellow\par

## 2023-05-31 LAB
HBV SURFACE AB SER QL: REACTIVE
MEV IGG FLD QL IA: >300 AU/ML
MEV IGG+IGM SER-IMP: POSITIVE
MUV AB SER-ACNC: POSITIVE
MUV IGG SER QL IA: 210 AU/ML
RUBV IGG FLD-ACNC: 22 INDEX
RUBV IGG SER-IMP: POSITIVE
VZV AB TITR SER: POSITIVE
VZV IGG SER IF-ACNC: 1865 INDEX

## 2023-06-01 LAB
M TB IFN-G BLD-IMP: NEGATIVE
QUANTIFERON TB PLUS MITOGEN MINUS NIL: >10 IU/ML
QUANTIFERON TB PLUS NIL: 0.03 IU/ML
QUANTIFERON TB PLUS TB1 MINUS NIL: 0 IU/ML
QUANTIFERON TB PLUS TB2 MINUS NIL: 0 IU/ML

## 2023-06-02 LAB — CALPROTECTIN FECAL: 16 UG/G

## 2023-06-14 ENCOUNTER — APPOINTMENT (OUTPATIENT)
Dept: PULMONOLOGY | Facility: CLINIC | Age: 33
End: 2023-06-14
Payer: COMMERCIAL

## 2023-06-14 VITALS
SYSTOLIC BLOOD PRESSURE: 110 MMHG | BODY MASS INDEX: 21.16 KG/M2 | TEMPERATURE: 97.3 F | WEIGHT: 112 LBS | DIASTOLIC BLOOD PRESSURE: 82 MMHG | HEART RATE: 69 BPM | OXYGEN SATURATION: 99 %

## 2023-06-14 DIAGNOSIS — Z02.1 ENCOUNTER FOR PRE-EMPLOYMENT EXAMINATION: ICD-10-CM

## 2023-06-14 PROCEDURE — 99213 OFFICE O/P EST LOW 20 MIN: CPT

## 2023-06-14 NOTE — PHYSICAL EXAM
[No Acute Distress] : no acute distress [Normal Rate/Rhythm] : normal rate/rhythm [Normal S1, S2] : normal s1, s2 [No Resp Distress] : no resp distress [Clear to Auscultation Bilaterally] : clear to auscultation bilaterally [Not Tender] : not tender [Soft] : soft [No HSM] : no hsm [Normal Gait] : normal gait [No Cyanosis] : no cyanosis [No Edema] : no edema [Normal Turgor] : normal turgor [No Focal Deficits] : no focal deficits [Oriented x3] : oriented x3 [Normal Affect] : normal affect

## 2023-06-14 NOTE — DISCUSSION/SUMMARY
[FreeTextEntry1] : She is a 32 year old woman with history of asthma and inflammatory bowel disease. Neither are active at this time. \par \par Impression: \par Normal physical examination\par - titers reviewed\par - health assessment form completed\par - copy of titers given to her\par \par Plan: \par Blood work repeated\par Will see again as needed

## 2023-06-14 NOTE — HISTORY OF PRESENT ILLNESS
[Never] : never [TextBox_4] : She came for pre-employment examination. She is feeling well. Has been seen by GI. Back on mesalamine.  [Difficulty Initiating Sleep] : does not have difficulty initiating sleep [Difficulty Maintaining Sleep] : does not have difficulty maintaining sleep [Recent  Weight Gain] : no recent weight gain

## 2023-06-14 NOTE — REVIEW OF SYSTEMS
[Fatigue] : no fatigue [Sinus Problems] : no sinus problems [Cough] : no cough [Dyspnea] : no dyspnea [Wheezing] : no wheezing [Chest Discomfort] : no chest discomfort [Edema] : no edema [Nasal Discharge] : no nasal discharge [Abdominal Pain] : no abdominal pain [Diarrhea] : no diarrhea [Myalgias] : no myalgias [Rash] : no rash [Anemia] : no anemia [Headache] : no headache [Depression] : no depression [Anxiety] : no anxiety [Diabetes] : no diabetes [Thyroid Problem] : no thyroid problem

## 2023-06-28 ENCOUNTER — APPOINTMENT (OUTPATIENT)
Dept: GASTROENTEROLOGY | Facility: CLINIC | Age: 33
End: 2023-06-28

## 2023-09-01 ENCOUNTER — APPOINTMENT (OUTPATIENT)
Dept: PULMONOLOGY | Facility: CLINIC | Age: 33
End: 2023-09-01
Payer: COMMERCIAL

## 2023-09-01 VITALS
SYSTOLIC BLOOD PRESSURE: 120 MMHG | TEMPERATURE: 97.3 F | WEIGHT: 113 LBS | HEART RATE: 93 BPM | OXYGEN SATURATION: 99 % | BODY MASS INDEX: 21.35 KG/M2 | DIASTOLIC BLOOD PRESSURE: 72 MMHG

## 2023-09-01 DIAGNOSIS — R05.9 COUGH, UNSPECIFIED: ICD-10-CM

## 2023-09-01 DIAGNOSIS — H66.90 OTITIS MEDIA, UNSPECIFIED, UNSPECIFIED EAR: ICD-10-CM

## 2023-09-01 PROCEDURE — 99213 OFFICE O/P EST LOW 20 MIN: CPT

## 2023-09-01 RX ORDER — PREDNISONE 10 MG/1
10 TABLET ORAL DAILY
Qty: 30 | Refills: 0 | Status: ACTIVE | COMMUNITY
Start: 2023-09-01 | End: 1900-01-01

## 2023-09-01 RX ORDER — MESALAMINE 1.2 G/1
1.2 TABLET, DELAYED RELEASE ORAL
Qty: 360 | Refills: 3 | Status: DISCONTINUED | COMMUNITY
Start: 2020-03-17 | End: 2023-09-01

## 2023-09-01 RX ORDER — AZITHROMYCIN 250 MG/1
250 TABLET, FILM COATED ORAL
Qty: 1 | Refills: 1 | Status: ACTIVE | COMMUNITY
Start: 2023-09-01 | End: 1900-01-01

## 2023-09-01 RX ORDER — PROMETHAZINE HYDROCHLORIDE AND DEXTROMETHORPHAN HYDROBROMIDE ORAL SOLUTION 15; 6.25 MG/5ML; MG/5ML
6.25-15 SOLUTION ORAL EVERY 6 HOURS
Qty: 1 | Refills: 0 | Status: ACTIVE | COMMUNITY
Start: 2023-09-01 | End: 1900-01-01

## 2023-09-01 RX ORDER — PROMETHAZINE HYDROCHLORIDE AND CODEINE PHOSPHATE 6.25; 1 MG/5ML; MG/5ML
6.25-1 SOLUTION ORAL 3 TIMES DAILY
Qty: 240 | Refills: 0 | Status: ACTIVE | COMMUNITY
Start: 2023-09-01 | End: 1900-01-01

## 2023-09-01 NOTE — HISTORY OF PRESENT ILLNESS
[Never] : never [TextBox_4] : Has had URI symptoms for several days. C/O sore throat, cough and loss of voice. She is unable to hear out of her left ear.  No loss of taste or smell.  No chest pain or shortness of breath. Tested negative for COVID both on rapid and PCR tests. Has had a fever.    [Difficulty Initiating Sleep] : does not have difficulty initiating sleep [Difficulty Maintaining Sleep] : does not have difficulty maintaining sleep [Recent  Weight Gain] : no recent weight gain

## 2023-09-01 NOTE — REVIEW OF SYSTEMS
[Fever] : fever [Fatigue] : fatigue [Sore Throat] : sore throat [Cough] : cough [Sinus Problems] : no sinus problems [Dyspnea] : no dyspnea [Wheezing] : no wheezing [Edema] : no edema [Chest Discomfort] : no chest discomfort [Nasal Discharge] : no nasal discharge [Abdominal Pain] : no abdominal pain [Diarrhea] : no diarrhea [Myalgias] : no myalgias [Rash] : no rash [Anemia] : no anemia [Headache] : no headache [Depression] : no depression [Anxiety] : no anxiety [Diabetes] : no diabetes [Thyroid Problem] : no thyroid problem

## 2023-09-01 NOTE — PHYSICAL EXAM
[No Acute Distress] : no acute distress [Normal Rate/Rhythm] : normal rate/rhythm [Normal S1, S2] : normal s1, s2 [No Resp Distress] : no resp distress [Clear to Auscultation Bilaterally] : clear to auscultation bilaterally [Not Tender] : not tender [Soft] : soft [No HSM] : no hsm [Normal Gait] : normal gait [No Cyanosis] : no cyanosis [No Edema] : no edema [Normal Turgor] : normal turgor [No Focal Deficits] : no focal deficits [Oriented x3] : oriented x3 [Normal Affect] : normal affect [Erythema] : erythema [No Abnormalities] : no abnormalities [TextBox_11] : Erythema of the left tympanic membrane noted.  Right side normal.  Throat was normal.   [TextBox_44] : Tender on the left side of the neck.  No palpable adenopathy.

## 2023-09-01 NOTE — DISCUSSION/SUMMARY
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  Has had URI symptoms for several days. C/O sore throat, cough and loss of voice. No loss of taste or smell.  Tested negative for COVID both on rapid and PCR tests. Has had a fever.   Impression Middle ear infection, left side  Plan Zithromax Promethazine with codeine for cough Prednisone 10 mg/day for 5 days Consider seeing ENT if not better in 1 or 2 days Should not go to work until his symptoms resolve

## 2023-10-18 ENCOUNTER — APPOINTMENT (OUTPATIENT)
Dept: HUMAN REPRODUCTION | Facility: CLINIC | Age: 33
End: 2023-10-18
Payer: COMMERCIAL

## 2023-10-18 PROCEDURE — 76830 TRANSVAGINAL US NON-OB: CPT

## 2023-10-18 PROCEDURE — 36415 COLL VENOUS BLD VENIPUNCTURE: CPT

## 2023-10-18 PROCEDURE — 99205 OFFICE O/P NEW HI 60 MIN: CPT | Mod: 25

## 2023-10-25 ENCOUNTER — APPOINTMENT (OUTPATIENT)
Dept: RADIOLOGY | Facility: CLINIC | Age: 33
End: 2023-10-25
Payer: COMMERCIAL

## 2023-10-25 PROCEDURE — 74740 X-RAY FEMALE GENITAL TRACT: CPT

## 2023-11-17 ENCOUNTER — APPOINTMENT (OUTPATIENT)
Dept: HUMAN REPRODUCTION | Facility: CLINIC | Age: 33
End: 2023-11-17

## 2023-11-24 ENCOUNTER — APPOINTMENT (OUTPATIENT)
Dept: HUMAN REPRODUCTION | Facility: CLINIC | Age: 33
End: 2023-11-24
Payer: COMMERCIAL

## 2023-11-24 PROCEDURE — 99213 OFFICE O/P EST LOW 20 MIN: CPT | Mod: 95

## 2024-02-09 ENCOUNTER — APPOINTMENT (OUTPATIENT)
Dept: PULMONOLOGY | Facility: CLINIC | Age: 34
End: 2024-02-09
Payer: COMMERCIAL

## 2024-02-09 VITALS — OXYGEN SATURATION: 98 % | TEMPERATURE: 98.3 F | HEART RATE: 74 BPM

## 2024-02-09 DIAGNOSIS — J04.0 ACUTE LARYNGITIS: ICD-10-CM

## 2024-02-09 PROCEDURE — 99213 OFFICE O/P EST LOW 20 MIN: CPT

## 2024-02-09 NOTE — PHYSICAL EXAM
[No Acute Distress] : no acute distress [Erythema] : erythema [No Neck Mass] : no neck mass [Normal Rate/Rhythm] : normal rate/rhythm [Normal S1, S2] : normal s1, s2 [No Murmurs] : no murmurs [No Resp Distress] : no resp distress [Clear to Auscultation Bilaterally] : clear to auscultation bilaterally [No Abnormalities] : no abnormalities [Not Tender] : not tender [Soft] : soft [No HSM] : no hsm [Normal Gait] : normal gait [No Cyanosis] : no cyanosis [No Edema] : no edema [Normal Turgor] : normal turgor [No Focal Deficits] : no focal deficits [Oriented x3] : oriented x3 [Normal Affect] : normal affect [TextBox_11] : Mild pharyngeal erythema.  No exudative lesions.

## 2024-02-09 NOTE — HISTORY OF PRESENT ILLNESS
[Never] : never [TextBox_4] : She has had a cough and a sore throat for the past several days.  No fever or chills.  She tested negative for COVID.  She is pregnant and due in July 2024. [Difficulty Initiating Sleep] : does not have difficulty initiating sleep [Difficulty Maintaining Sleep] : does not have difficulty maintaining sleep [Recent  Weight Gain] : no recent weight gain

## 2024-02-09 NOTE — DISCUSSION/SUMMARY
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  Impression Upper respiratory tract infection -Most likely viral -Negative COVID PCR Currently pregnant  Recommend Stay home from work for several days -It is likely that she can go back on February 15 Symptomatic treatment Avoid medications if possible

## 2024-02-09 NOTE — REVIEW OF SYSTEMS
[Fever] : no fever [Fatigue] : fatigue [Epistaxis] : epistaxis [Sore Throat] : sore throat [Nasal Congestion] : nasal congestion [Cough] : cough [Sputum] : no sputum [Dyspnea] : no dyspnea [Wheezing] : no wheezing [Chest Discomfort] : no chest discomfort [Edema] : no edema [Nasal Discharge] : no nasal discharge [Abdominal Pain] : no abdominal pain [Diarrhea] : no diarrhea [Myalgias] : no myalgias [Rash] : no rash [Anemia] : no anemia [Headache] : no headache [Depression] : no depression [Anxiety] : no anxiety [Diabetes] : no diabetes [Thyroid Problem] : no thyroid problem

## 2024-04-04 RX ORDER — SODIUM CHLORIDE FOR INHALATION 0.9 %
0.9 VIAL, NEBULIZER (ML) INHALATION
Qty: 2 | Refills: 5 | Status: ACTIVE | COMMUNITY
Start: 2024-04-04 | End: 1900-01-01

## 2024-04-04 NOTE — ED PROVIDER NOTE - CLINICAL SUMMARY MEDICAL DECISION MAKING FREE TEXT BOX
Central Venous Line w/wo PAC    Performed by: Lawrence Loya MD  Authorized by: Lawrence Loya MD    Patient Location*:  OR  Indication: central venous access    Prep*:  Chlorhexidine gluconate (CHG)  Max Sterile Barrier Technique*:  Hand Washing, Cap/Mask, Sterile gown, Sterile gloves, Sterile drape, Sterile dressing applied and Biopatch  Patient Position:  Supine  Laterality:  Right  Site:  Internal jugular  Catheter Type:  Introducer  Number of Lumens*:  Double lumen  Catheter Size:  9 Fr  Catheter Depth (cm):  12  target vein identified, needle advanced into vein and blood aspirated and guidewire advanced into vein    Ultrasound-Guided: Yes    Ultrasound Info:  Permanent recording and image saved and vessel patent  Sterile Gel and Probe Cover Used for Ultrasound?: Yes    Intravenous Verification: verified by ultrasound, venous blood return and verified by x-ray    .: all ports aspirated, all ports flushed easily, guidewire was removed intact, biopatch was applied, line was sutured in place and dressing was applied    Events: patient tolerated procedure well with no complications    Attempts:  1   PA Catheter Placed?: Yes    Laterality:  Right  Site:  Internal jugular  PA Cath Lumens:  Triple  Introducer Size:  9 Fr  PA Catheter Size:  8  PA Catheter Type:  Oximetric  PA Cath Depth (cm):  55  Placement Confirmation: pressure tracing changes, verified by ALONDRA and placement verified by x-ray    Events:  Patient tolerated procedure well with no complications  Start Time:  8/5/2023 7:37 AM  End Time:  8/5/2023 7:45 AM      
Yes
pt with diffuse abd pain with high fever for two days.  will get labs, ivf pain meds and start antibiotics    pt states pain is very servere and cannot eat  will admit for further mgt and work up

## 2024-07-14 ENCOUNTER — OUTPATIENT (OUTPATIENT)
Dept: OUTPATIENT SERVICES | Facility: HOSPITAL | Age: 34
LOS: 1 days | End: 2024-07-14
Payer: COMMERCIAL

## 2024-07-14 VITALS — DIASTOLIC BLOOD PRESSURE: 62 MMHG | HEART RATE: 80 BPM | SYSTOLIC BLOOD PRESSURE: 128 MMHG

## 2024-07-14 VITALS
TEMPERATURE: 99 F | RESPIRATION RATE: 18 BRPM | SYSTOLIC BLOOD PRESSURE: 113 MMHG | DIASTOLIC BLOOD PRESSURE: 71 MMHG | OXYGEN SATURATION: 98 % | HEART RATE: 84 BPM

## 2024-07-14 DIAGNOSIS — O26.899 OTHER SPECIFIED PREGNANCY RELATED CONDITIONS, UNSPECIFIED TRIMESTER: ICD-10-CM

## 2024-07-14 LAB
APPEARANCE UR: CLEAR — SIGNIFICANT CHANGE UP
BILIRUB UR-MCNC: NEGATIVE — SIGNIFICANT CHANGE UP
COLOR SPEC: YELLOW — SIGNIFICANT CHANGE UP
DIFF PNL FLD: NEGATIVE — SIGNIFICANT CHANGE UP
GLUCOSE UR QL: NEGATIVE MG/DL — SIGNIFICANT CHANGE UP
KETONES UR-MCNC: NEGATIVE MG/DL — SIGNIFICANT CHANGE UP
LEUKOCYTE ESTERASE UR-ACNC: NEGATIVE — SIGNIFICANT CHANGE UP
NITRITE UR-MCNC: NEGATIVE — SIGNIFICANT CHANGE UP
PH UR: 5.5 — SIGNIFICANT CHANGE UP (ref 5–8)
PROT UR-MCNC: NEGATIVE MG/DL — SIGNIFICANT CHANGE UP
SP GR SPEC: 1.01 — SIGNIFICANT CHANGE UP (ref 1–1.03)
UROBILINOGEN FLD QL: 0.2 MG/DL — SIGNIFICANT CHANGE UP (ref 0.2–1)

## 2024-07-14 PROCEDURE — G0463: CPT

## 2024-07-14 PROCEDURE — 81003 URINALYSIS AUTO W/O SCOPE: CPT

## 2024-07-14 PROCEDURE — 96360 HYDRATION IV INFUSION INIT: CPT

## 2024-07-14 PROCEDURE — 59025 FETAL NON-STRESS TEST: CPT

## 2024-07-14 RX ORDER — DEXTROSE MONOHYDRATE AND SODIUM CHLORIDE 5; .3 G/100ML; G/100ML
1000 INJECTION, SOLUTION INTRAVENOUS ONCE
Refills: 0 | Status: COMPLETED | OUTPATIENT
Start: 2024-07-14 | End: 2024-07-14

## 2024-07-14 RX ADMIN — DEXTROSE MONOHYDRATE AND SODIUM CHLORIDE 1000 MILLILITER(S): 5; .3 INJECTION, SOLUTION INTRAVENOUS at 13:37

## 2024-07-14 NOTE — OB PROVIDER TRIAGE NOTE - NSHPPHYSICALEXAM_GEN_ALL_CORE
Gen: NAD  Abd: soft, gravid, non-tender  EFH: 130/mod/+accels/-decels; reactive   [place]: irregular ctx approx q8min  VE: 0/0/-3

## 2024-07-14 NOTE — OB PROVIDER TRIAGE NOTE - HISTORY OF PRESENT ILLNESS
R3 OB Triage Note    Pt is a 34y/o  at 35w3d presenting due to painful ctx that started around midnight last night. Pt reports irregular pressure/cramping as if she has to defecate. Reports pain as 8/10 at its worst. Reports she has been tolerating PO. Pt denies VB, LOF. +FM. Pt denies fevers, chills, n/v, chest pain, SOB, dysuria, dizziness, syncope.  Prenatal course uncomplicated thus far per pt.  GBS unk  EFW 2400g extrapolated from 33w sono    OBHx:   GynHx: h/o ruptured ovarian cyst, h/o HSV without recent flare, denies h/o abnormal paps, other STI's, fibroids  PMHx: h/o ulcerative colitis for which pt was taking Mesalamine prior to pregnancy, not currently undergoing active treatment, pt report last flare was approx 2y ago, pt reports she avoids NSAIDs. H/o asthma  PSHx: denies  Med: PNV  All: NKDA  SH: denies alcohol, tobacco, or drug use  Psych: denies h/o anxiety or depression

## 2024-07-14 NOTE — OB RN TRIAGE NOTE - NS_OBACUITY_OBGYN_ALL_OB_DT
Patient Discharge Instructions  Dr. Reji Redding [id number]  [phone]    Discharge Date:  7/13/2020    Discharged To: Home      RESUME ACTIVITY:      BATHING:   OK to shower but no bath tub or submerging incision under water    Wound:    Keep wound dry and clean. May shower as instructed above. Dressing:    Remove outer dressing daily after shower or if soiled. DRIVING:   driving OK    RETURN TO WORK: when cleared after your follow up office visit    WALKING:    As tolerated     STAIRS:    As tolerated    LIFTING:   Less than 10 pounds with Right arm for one week    DIET:    Regular diet    PAIN:    Take Tylenol (up to 1,000mg every 6 hours) and Advil (up to 800mg every 6 hours) as needed for pain. SPECIAL INSTRUCTIONS:     Call the office at [phone]  if you have a fever greater than or equal to 101 oF or if your incision becomes red, tender, or has drainage of pus. If follow up appointment was not given to you, call the [facility] at [phone] for follow up appointment with Dr. Hugo Mena in:  1-2 weeks. 14-Jul-2024 13:00

## 2024-07-14 NOTE — OB PROVIDER TRIAGE NOTE - NSOBPROVIDERNOTE_OBGYN_ALL_OB_FT
A/P: Pt is a 34y/o  at 35w3d presenting due to painful ctx that started around midnight last night. VE 0/0/-3.     -1L LR bolus  -UA  -Cont EFM/toco  -Will reassess pending the above    D/w Dr. Whit Morrison PGY3    ______________  Addendum:  UA negative. Pt reassessed at bedside, reporting ctx have improved somewhat. Pt well appearing. Ctx irregularly approx q8-10 min on toco. Suspect  ctx without labor at this time. Return precautions reviewed, including worsening regular painful ctx, VB, LOF, decreased FM    D/w Dr. Whit Morrison PGY3

## 2024-07-17 DIAGNOSIS — Z3A.35 35 WEEKS GESTATION OF PREGNANCY: ICD-10-CM

## 2024-07-17 DIAGNOSIS — J45.909 UNSPECIFIED ASTHMA, UNCOMPLICATED: ICD-10-CM

## 2024-07-17 DIAGNOSIS — O99.513 DISEASES OF THE RESPIRATORY SYSTEM COMPLICATING PREGNANCY, THIRD TRIMESTER: ICD-10-CM

## 2024-07-17 DIAGNOSIS — O47.03 FALSE LABOR BEFORE 37 COMPLETED WEEKS OF GESTATION, THIRD TRIMESTER: ICD-10-CM

## 2024-07-17 DIAGNOSIS — K51.919 ULCERATIVE COLITIS, UNSPECIFIED WITH UNSPECIFIED COMPLICATIONS: ICD-10-CM

## 2024-07-17 DIAGNOSIS — O99.613 DISEASES OF THE DIGESTIVE SYSTEM COMPLICATING PREGNANCY, THIRD TRIMESTER: ICD-10-CM

## 2024-07-25 ENCOUNTER — APPOINTMENT (OUTPATIENT)
Dept: ANTEPARTUM | Facility: CLINIC | Age: 34
End: 2024-07-25

## 2024-08-04 ENCOUNTER — OUTPATIENT (OUTPATIENT)
Dept: OUTPATIENT SERVICES | Facility: HOSPITAL | Age: 34
LOS: 1 days | End: 2024-08-04
Payer: COMMERCIAL

## 2024-08-04 VITALS
OXYGEN SATURATION: 98 % | DIASTOLIC BLOOD PRESSURE: 65 MMHG | TEMPERATURE: 98 F | SYSTOLIC BLOOD PRESSURE: 101 MMHG | RESPIRATION RATE: 17 BRPM | HEART RATE: 98 BPM

## 2024-08-04 VITALS
OXYGEN SATURATION: 98 % | HEART RATE: 82 BPM | DIASTOLIC BLOOD PRESSURE: 65 MMHG | TEMPERATURE: 98 F | SYSTOLIC BLOOD PRESSURE: 101 MMHG

## 2024-08-04 DIAGNOSIS — O26.899 OTHER SPECIFIED PREGNANCY RELATED CONDITIONS, UNSPECIFIED TRIMESTER: ICD-10-CM

## 2024-08-04 PROCEDURE — 83986 ASSAY PH BODY FLUID NOS: CPT

## 2024-08-04 PROCEDURE — G0463: CPT

## 2024-08-05 ENCOUNTER — INPATIENT (INPATIENT)
Facility: HOSPITAL | Age: 34
LOS: 2 days | Discharge: HOME | DRG: 951 | End: 2024-08-08
Attending: STUDENT IN AN ORGANIZED HEALTH CARE EDUCATION/TRAINING PROGRAM | Admitting: STUDENT IN AN ORGANIZED HEALTH CARE EDUCATION/TRAINING PROGRAM
Payer: COMMERCIAL

## 2024-08-05 VITALS — OXYGEN SATURATION: 98 % | HEART RATE: 71 BPM

## 2024-08-05 DIAGNOSIS — Z34.80 ENCOUNTER FOR SUPERVISION OF OTHER NORMAL PREGNANCY, UNSPECIFIED TRIMESTER: ICD-10-CM

## 2024-08-05 DIAGNOSIS — O26.899 OTHER SPECIFIED PREGNANCY RELATED CONDITIONS, UNSPECIFIED TRIMESTER: ICD-10-CM

## 2024-08-05 LAB
BASOPHILS # BLD AUTO: 0.02 K/UL — SIGNIFICANT CHANGE UP (ref 0–0.2)
BASOPHILS NFR BLD AUTO: 0.2 % — SIGNIFICANT CHANGE UP (ref 0–2)
BLD GP AB SCN SERPL QL: NEGATIVE — SIGNIFICANT CHANGE UP
EOSINOPHIL # BLD AUTO: 0.02 K/UL — SIGNIFICANT CHANGE UP (ref 0–0.5)
EOSINOPHIL NFR BLD AUTO: 0.2 % — SIGNIFICANT CHANGE UP (ref 0–6)
HCT VFR BLD CALC: 35.5 % — SIGNIFICANT CHANGE UP (ref 34.5–45)
HGB BLD-MCNC: 11.6 G/DL — SIGNIFICANT CHANGE UP (ref 11.5–15.5)
IMM GRANULOCYTES NFR BLD AUTO: 1.1 % — HIGH (ref 0–0.9)
LYMPHOCYTES # BLD AUTO: 1.63 K/UL — SIGNIFICANT CHANGE UP (ref 1–3.3)
LYMPHOCYTES # BLD AUTO: 12.9 % — LOW (ref 13–44)
MCHC RBC-ENTMCNC: 25.6 PG — LOW (ref 27–34)
MCHC RBC-ENTMCNC: 32.7 GM/DL — SIGNIFICANT CHANGE UP (ref 32–36)
MCV RBC AUTO: 78.4 FL — LOW (ref 80–100)
MONOCYTES # BLD AUTO: 0.62 K/UL — SIGNIFICANT CHANGE UP (ref 0–0.9)
MONOCYTES NFR BLD AUTO: 4.9 % — SIGNIFICANT CHANGE UP (ref 2–14)
NEUTROPHILS # BLD AUTO: 10.25 K/UL — HIGH (ref 1.8–7.4)
NEUTROPHILS NFR BLD AUTO: 80.7 % — HIGH (ref 43–77)
NRBC # BLD: 0 /100 WBCS — SIGNIFICANT CHANGE UP (ref 0–0)
PLATELET # BLD AUTO: 207 K/UL — SIGNIFICANT CHANGE UP (ref 150–400)
RBC # BLD: 4.53 M/UL — SIGNIFICANT CHANGE UP (ref 3.8–5.2)
RBC # FLD: 15.5 % — HIGH (ref 10.3–14.5)
RH IG SCN BLD-IMP: POSITIVE — SIGNIFICANT CHANGE UP
RH IG SCN BLD-IMP: POSITIVE — SIGNIFICANT CHANGE UP
WBC # BLD: 12.68 K/UL — HIGH (ref 3.8–10.5)
WBC # FLD AUTO: 12.68 K/UL — HIGH (ref 3.8–10.5)

## 2024-08-05 RX ORDER — TRISODIUM CITRATE DIHYDRATE AND CITRIC ACID MONOHYDRATE 500; 334 MG/5ML; MG/5ML
15 SOLUTION ORAL EVERY 6 HOURS
Refills: 0 | Status: DISCONTINUED | OUTPATIENT
Start: 2024-08-05 | End: 2024-08-06

## 2024-08-05 RX ORDER — DEXTROSE MONOHYDRATE, SODIUM CHLORIDE, SODIUM LACTATE, CALCIUM CHLORIDE, MAGNESIUM CHLORIDE 1.5; 538; 448; 18.4; 5.08 G/100ML; MG/100ML; MG/100ML; MG/100ML; MG/100ML
1000 SOLUTION INTRAPERITONEAL
Refills: 0 | Status: DISCONTINUED | OUTPATIENT
Start: 2024-08-05 | End: 2024-08-06

## 2024-08-05 RX ORDER — ONDANSETRON HCL/PF 4 MG/2 ML
4 VIAL (ML) INJECTION ONCE
Refills: 0 | Status: DISCONTINUED | OUTPATIENT
Start: 2024-08-05 | End: 2024-08-08

## 2024-08-05 RX ORDER — OXYTOCIN/RINGER'S LACTATE 20/1000 ML
4 PLASTIC BAG, INJECTION (ML) INTRAVENOUS
Qty: 30 | Refills: 0 | Status: DISCONTINUED | OUTPATIENT
Start: 2024-08-05 | End: 2024-08-06

## 2024-08-05 RX ORDER — OXYTOCIN/RINGER'S LACTATE 20/1000 ML
333.33 PLASTIC BAG, INJECTION (ML) INTRAVENOUS
Qty: 20 | Refills: 0 | Status: COMPLETED | OUTPATIENT
Start: 2024-08-05 | End: 2024-08-05

## 2024-08-05 RX ORDER — CHLORHEXIDINE GLUCONATE 500 MG/1
1 CLOTH TOPICAL DAILY
Refills: 0 | Status: DISCONTINUED | OUTPATIENT
Start: 2024-08-05 | End: 2024-08-06

## 2024-08-05 RX ADMIN — Medication 4 MILLIUNIT(S)/MIN: at 16:47

## 2024-08-05 NOTE — OB PROVIDER H&P - ASSESSMENT
A/P: 34yo G1 @ 38w3d here for IOL 2 PROM  @@ 4a  - Admit to L&D  - Routine labs   - EFM/TOCO: resuscitative measures prn  - Augment with Pitocin   - Anesthesia consult for epidural prn  - Expect     d/w Dr. Stacie Swartz, PA-C

## 2024-08-05 NOTE — PRE-ANESTHESIA EVALUATION ADULT - NSANTHAIRWAYFT_ENT_ALL_CORE
Impression: Age-related nuclear cataract, left eye: H25.12. Plan: Cataracts account for the patient's complaints. No treatment currently recommended. The patient will monitor vision changes and contact us with any decrease in vision. > 3 FB, normal ROM of neck

## 2024-08-05 NOTE — OB PROVIDER H&P - HISTORY OF PRESENT ILLNESS
Pt is a 32yo G1 @ 38w3d here with ROM, clear confirmed in PN office that started at 4a. Pt also notes some painful ctx since 4a. Pt denies VB. +FM. PNC uncomplicated GBS (-) EFW 3100g    OBHx: primip  GYNHx: denies ovarian cysts, fibroids, hx of abnormal pap or STDs  PMHx: Asthma (denies intubation/hospitalizations); Ulcerative Colitis  PSurgHx: denies  Allergies: NKDA  Meds: PNV, Mesalamine prior pregnancy  Social: No smoking, alcohol, or illicit drug use  Psych: No hx of anxiety or depression

## 2024-08-05 NOTE — OB PROVIDER H&P - NSOBVTERISKREFER_OBGYN_ALL_OB
Reason for call:  Patient reporting a symptom    Symptom or request: Palpitation and shortness of breath .    Duration (how long have symptoms been present): couple months .    Have you been treated for this before? No    Additional comments: patient states she has been having shortness of breath, increase in palpitations for past couple months and its getting worse. Please advise.     Phone Number patient can be reached at:  Home number on file [phone] (home)    Best Time:  any    Can we leave a detailed message on this number:  YES    Call taken on 8/15/2017 at 9:05 AM by Rosa Hines   Refer to the Assessment tab to view/cancel completed assessment.

## 2024-08-05 NOTE — OB PROVIDER LABOR PROGRESS NOTE - NS_DILATION_OBGYN_ALL_OB_NU
4
Patient is requesting a virtual visit. Can you please get her scheduled?  Thank you
Pt has cough. Would like a call back from the nurse.
9.5
10
4.5
9.5
6.5

## 2024-08-05 NOTE — OB PROVIDER LABOR PROGRESS NOTE - NSVAGINALEXAM_OBGYN_ALL_OB_DT
05-Aug-2024 21:10
05-Aug-2024 23:28
05-Aug-2024 23:02
05-Aug-2024 18:40
05-Aug-2024 22:36
05-Aug-2024 19:35

## 2024-08-05 NOTE — OB PROVIDER LABOR PROGRESS NOTE - NS_OBIHIFHRDETAILS_OBGYN_ALL_OB_FT
150 no accels no decels mod variab
150 small accels, occ variable vs late decels mod variab
150 no accels occ variable vs late decels mod variab
150 + accels +variable decels and one proloned decel just now  mod variab

## 2024-08-05 NOTE — OB PROVIDER H&P - NSHPPHYSICALEXAM_GEN_ALL_CORE
PE:    T(C): 36.7 (08-05-24 @ 13:28), Max: 36.7 (08-05-24 @ 13:28)  HR: 73 (08-05-24 @ 14:13) (71 - 83)  BP: 107/65 (08-05-24 @ 13:28) (107/65 - 107/65)  RR: 18 (08-05-24 @ 13:28) (18 - 18)  SpO2: 98% (08-05-24 @ 14:13) (94% - 98%)    General: NAD, A&Ox3  CV: RRR  Lungs: Clear bilat   Abd: soft, nontender, gravid  SSE: confirmed in office with Dr. Valencia  VE: 0.5/50/-3  BSUS: vertex  EFM: 145/moderate variablity/-accels/-decels  TOCO: none

## 2024-08-05 NOTE — OB PROVIDER LABOR PROGRESS NOTE - NS_SUBJECTIVE/OBJECTIVE_OBGYN_ALL_OB_FT
More pressure
More pressure
Comfortable  Bloody show
Comfortable
Comfortable with epidural, feels rectal pressure  Incr bloody show noted
Att  34 yo P0 at term, adm for SROM and early labor ctx's.  Thought she broke her water yesterday, but w/u was neg, then felt leaking again at 4am today, and SROM confirmed in office.  Uncompl APC, no sig PMHx, GBS neg.  Now s/p epidural and comfortable.  Pitocin was started but then prolonged decel noted and Pit stopped approx 90m ago.

## 2024-08-05 NOTE — OB PROVIDER LABOR PROGRESS NOTE - ASSESSMENT
Overall stable fetal status  Clinically adeq pelvis, clinical efw 7 to 7 1/2 lbs.  Forebag ruptured of clear fluid.   Plan restart Pit, monitor FHRT  WENDY Quinones
Overall stable fetal status  Will cont to monitor FHRT  Will try different maternal positioning.   Cont Pit  Reviewed with pt and  poss indication for intervention  WENDY Quinones
Stable fetal status  Cont labor
Overall stable fetal status, can cont labor  Anticipate begin pushing soon  D/w pt poss need for intervention for fetal indications if decels persist or worsen during second stage, pros/cons of VAVD vs LFD vs C/S d/w pt and .   
Stable FHRT  Still ant lip  Cont labor
Stable fetal status  Begin pushing

## 2024-08-05 NOTE — OB PROVIDER H&P - NSLOWPPHRISK_OBGYN_A_OB
No previous uterine incision/Rubi Pregnancy/Less than or equal to 4 previous vaginal births/No known bleeding disorder/No history of postpartum hemorrhage/No other PPH risks indicated

## 2024-08-06 LAB — T PALLIDUM AB TITR SER: NEGATIVE — SIGNIFICANT CHANGE UP

## 2024-08-06 RX ORDER — SENNOSIDES 8.6 MG/1
2 TABLET ORAL AT BEDTIME
Refills: 0 | Status: DISCONTINUED | OUTPATIENT
Start: 2024-08-06 | End: 2024-08-08

## 2024-08-06 RX ORDER — MESALAMINE 1.2 G/1
1200 TABLET, DELAYED RELEASE ORAL DAILY
Refills: 0 | Status: DISCONTINUED | OUTPATIENT
Start: 2024-08-06 | End: 2024-08-07

## 2024-08-06 RX ORDER — ACETAMINOPHEN 500 MG
975 TABLET ORAL EVERY 6 HOURS
Refills: 0 | Status: COMPLETED | OUTPATIENT
Start: 2024-08-06 | End: 2025-07-05

## 2024-08-06 RX ORDER — BACTERIOSTATIC SODIUM CHLORIDE 0.9 %
3 VIAL (ML) INJECTION EVERY 8 HOURS
Refills: 0 | Status: DISCONTINUED | OUTPATIENT
Start: 2024-08-06 | End: 2024-08-08

## 2024-08-06 RX ORDER — ACETAMINOPHEN 500 MG
1000 TABLET ORAL ONCE
Refills: 0 | Status: COMPLETED | OUTPATIENT
Start: 2024-08-06 | End: 2024-08-06

## 2024-08-06 RX ORDER — DIBUCAINE 1 %
1 OINTMENT (GRAM) TOPICAL EVERY 6 HOURS
Refills: 0 | Status: DISCONTINUED | OUTPATIENT
Start: 2024-08-06 | End: 2024-08-08

## 2024-08-06 RX ORDER — SIMETHICONE 125 MG/1
80 TABLET, CHEWABLE ORAL EVERY 4 HOURS
Refills: 0 | Status: DISCONTINUED | OUTPATIENT
Start: 2024-08-06 | End: 2024-08-08

## 2024-08-06 RX ORDER — MAGNESIUM HYDROXIDE 400 MG/5ML
30 SUSPENSION, ORAL (FINAL DOSE FORM) ORAL
Refills: 0 | Status: DISCONTINUED | OUTPATIENT
Start: 2024-08-06 | End: 2024-08-08

## 2024-08-06 RX ORDER — OXYCODONE HYDROCHLORIDE 30 MG/1
5 TABLET ORAL ONCE
Refills: 0 | Status: DISCONTINUED | OUTPATIENT
Start: 2024-08-06 | End: 2024-08-06

## 2024-08-06 RX ORDER — ACETAMINOPHEN 500 MG
975 TABLET ORAL EVERY 6 HOURS
Refills: 0 | Status: DISCONTINUED | OUTPATIENT
Start: 2024-08-06 | End: 2024-08-08

## 2024-08-06 RX ORDER — HYDROCORTISONE 1 %
1 CREAM (GRAM) TOPICAL EVERY 6 HOURS
Refills: 0 | Status: DISCONTINUED | OUTPATIENT
Start: 2024-08-06 | End: 2024-08-08

## 2024-08-06 RX ORDER — CRANBERRY FRUIT EXTRACT 650 MG
1 CAPSULE ORAL DAILY
Refills: 0 | Status: DISCONTINUED | OUTPATIENT
Start: 2024-08-06 | End: 2024-08-08

## 2024-08-06 RX ORDER — IBUPROFEN 200 MG
600 TABLET ORAL EVERY 6 HOURS
Refills: 0 | Status: DISCONTINUED | OUTPATIENT
Start: 2024-08-06 | End: 2024-08-06

## 2024-08-06 RX ORDER — OXYTOCIN/RINGER'S LACTATE 20/1000 ML
41.67 PLASTIC BAG, INJECTION (ML) INTRAVENOUS
Qty: 20 | Refills: 0 | Status: DISCONTINUED | OUTPATIENT
Start: 2024-08-06 | End: 2024-08-08

## 2024-08-06 RX ORDER — ACETAMINOPHEN 500 MG
975 TABLET ORAL
Refills: 0 | Status: DISCONTINUED | OUTPATIENT
Start: 2024-08-06 | End: 2024-08-06

## 2024-08-06 RX ORDER — OXYCODONE HYDROCHLORIDE 30 MG/1
5 TABLET ORAL ONCE
Refills: 0 | Status: DISCONTINUED | OUTPATIENT
Start: 2024-08-06 | End: 2024-08-08

## 2024-08-06 RX ORDER — IBUPROFEN 200 MG
600 TABLET ORAL EVERY 6 HOURS
Refills: 0 | Status: DISCONTINUED | OUTPATIENT
Start: 2024-08-06 | End: 2024-08-08

## 2024-08-06 RX ORDER — CLOSTRIDIUM TETANI TOXOID ANTIGEN (FORMALDEHYDE INACTIVATED), CORYNEBACTERIUM DIPHTHERIAE TOXOID ANTIGEN (FORMALDEHYDE INACTIVATED), BORDETELLA PERTUSSIS TOXOID ANTIGEN (GLUTARALDEHYDE INACTIVATED), BORDETELLA PERTUSSIS FILAMENTOUS HEMAGGLUTININ ANTIGEN (FORMALDEHYDE INACTIVATED), BORDETELLA PERTUSSIS PERTACTIN ANTIGEN, AND BORDETELLA PERTUSSIS FIMBRIAE 2/3 ANTIGEN 5; 2; 2.5; 5; 3; 5 [LF]/.5ML; [LF]/.5ML; UG/.5ML; UG/.5ML; UG/.5ML; UG/.5ML
0.5 INJECTION, SUSPENSION INTRAMUSCULAR ONCE
Refills: 0 | Status: DISCONTINUED | OUTPATIENT
Start: 2024-08-06 | End: 2024-08-08

## 2024-08-06 RX ORDER — KETOROLAC TROMETHAMINE 10 MG
30 TABLET ORAL ONCE
Refills: 0 | Status: DISCONTINUED | OUTPATIENT
Start: 2024-08-06 | End: 2024-08-06

## 2024-08-06 RX ORDER — WITCH HAZEL 500 MG/1
1 CLOTH TOPICAL EVERY 4 HOURS
Refills: 0 | Status: DISCONTINUED | OUTPATIENT
Start: 2024-08-06 | End: 2024-08-08

## 2024-08-06 RX ORDER — LANOLIN 100 %
1 OINTMENT (GRAM) TOPICAL EVERY 6 HOURS
Refills: 0 | Status: DISCONTINUED | OUTPATIENT
Start: 2024-08-06 | End: 2024-08-08

## 2024-08-06 RX ORDER — MEASELS, MUMPS, AND RUBELLA VACCINE, LIVE 3.4-4.2
0.5 KIT SUBCUTANEOUS ONCE
Refills: 0 | Status: COMPLETED | OUTPATIENT
Start: 2024-08-06 | End: 2024-08-08

## 2024-08-06 RX ORDER — OXYCODONE HYDROCHLORIDE 30 MG/1
5 TABLET ORAL
Refills: 0 | Status: DISCONTINUED | OUTPATIENT
Start: 2024-08-06 | End: 2024-08-06

## 2024-08-06 RX ORDER — DIPHENHYDRAMINE HCL 25 MG
25 CAPSULE ORAL EVERY 6 HOURS
Refills: 0 | Status: DISCONTINUED | OUTPATIENT
Start: 2024-08-06 | End: 2024-08-08

## 2024-08-06 RX ORDER — OXYCODONE HYDROCHLORIDE 30 MG/1
5 TABLET ORAL
Refills: 0 | Status: DISCONTINUED | OUTPATIENT
Start: 2024-08-06 | End: 2024-08-08

## 2024-08-06 RX ADMIN — Medication 975 MILLIGRAM(S): at 18:24

## 2024-08-06 RX ADMIN — Medication 975 MILLIGRAM(S): at 13:00

## 2024-08-06 RX ADMIN — Medication 30 MILLIGRAM(S): at 03:09

## 2024-08-06 RX ADMIN — Medication 400 MILLIGRAM(S): at 06:05

## 2024-08-06 RX ADMIN — Medication 600 MILLIGRAM(S): at 14:49

## 2024-08-06 RX ADMIN — Medication 600 MILLIGRAM(S): at 20:51

## 2024-08-06 RX ADMIN — Medication 975 MILLIGRAM(S): at 23:50

## 2024-08-06 RX ADMIN — Medication 975 MILLIGRAM(S): at 11:43

## 2024-08-06 RX ADMIN — Medication 975 MILLIGRAM(S): at 17:18

## 2024-08-06 RX ADMIN — Medication 125 MILLIUNIT(S)/MIN: at 00:09

## 2024-08-06 RX ADMIN — Medication 975 MILLIGRAM(S): at 23:16

## 2024-08-06 RX ADMIN — SENNOSIDES 2 TABLET(S): 8.6 TABLET ORAL at 20:21

## 2024-08-06 RX ADMIN — Medication 1 TABLET(S): at 11:42

## 2024-08-06 RX ADMIN — Medication 1000 MILLIUNIT(S)/MIN: at 03:23

## 2024-08-06 RX ADMIN — Medication 600 MILLIGRAM(S): at 16:34

## 2024-08-06 RX ADMIN — Medication 600 MILLIGRAM(S): at 20:21

## 2024-08-06 RX ADMIN — Medication 1000 MILLIGRAM(S): at 06:36

## 2024-08-06 NOTE — OB RN DELIVERY SUMMARY - BABY A: WEIGHT (GM) DELIVERY
Bactrim Pregnancy And Lactation Text: This medication is Pregnancy Category D and is known to cause fetal risk.  It is also excreted in breast milk. Minocycline Pregnancy And Lactation Text: This medication is Pregnancy Category D and not consider safe during pregnancy. It is also excreted in breast milk. Erythromycin Counseling:  I discussed with the patient the risks of erythromycin including but not limited to GI upset, allergic reaction, drug rash, diarrhea, increase in liver enzymes, and yeast infections. Azelaic Acid Counseling: Patient counseled that medicine may cause skin irritation and to avoid applying near the eyes.  In the event of skin irritation, the patient was advised to reduce the amount of the drug applied or use it less frequently.   The patient verbalized understanding of the proper use and possible adverse effects of azelaic acid.  All of the patient's questions and concerns were addressed. Isotretinoin Counseling: Patient should get monthly blood tests, not donate blood, not drive at night if vision affected, not share medication, and not undergo elective surgery for 6 months after tx completed. Side effects reviewed, pt to contact office should one occur. Tazorac Pregnancy And Lactation Text: This medication is not safe during pregnancy. It is unknown if this medication is excreted in breast milk. Birth Control Pills Pregnancy And Lactation Text: This medication should be avoided if pregnant and for the first 30 days post-partum. Use Enhanced Medication Counseling?: No Topical Sulfur Applications Counseling: Topical Sulfur Counseling: Patient counseled that this medication may cause skin irritation or allergic reactions.  In the event of skin irritation, the patient was advised to reduce the amount of the drug applied or use it less frequently.   The patient verbalized understanding of the proper use and possible adverse effects of topical sulfur application.  All of the patient's questions and concerns were addressed. Doxycycline Counseling:  Patient counseled regarding possible photosensitivity and increased risk for sunburn.  Patient instructed to avoid sunlight, if possible.  When exposed to sunlight, patients should wear protective clothing, sunglasses, and sunscreen.  The patient was instructed to call the office immediately if the following severe adverse effects occur:  hearing changes, easy bruising/bleeding, severe headache, or vision changes.  The patient verbalized understanding of the proper use and possible adverse effects of doxycycline.  All of the patient's questions and concerns were addressed. Topical Retinoid Pregnancy And Lactation Text: This medication is Pregnancy Category C. It is unknown if this medication is excreted in breast milk. Tetracycline Counseling: Patient counseled regarding possible photosensitivity and increased risk for sunburn.  Patient instructed to avoid sunlight, if possible.  When exposed to sunlight, patients should wear protective clothing, sunglasses, and sunscreen.  The patient was instructed to call the office immediately if the following severe adverse effects occur:  hearing changes, easy bruising/bleeding, severe headache, or vision changes.  The patient verbalized understanding of the proper use and possible adverse effects of tetracycline.  All of the patient's questions and concerns were addressed. Patient understands to avoid pregnancy while on therapy due to potential birth defects. Winlevi Counseling:  I discussed with the patient the risks of topical clascoterone including but not limited to erythema, scaling, itching, and stinging. Patient voiced their understanding. Azithromycin Pregnancy And Lactation Text: This medication is considered safe during pregnancy and is also secreted in breast milk. High Dose Vitamin A Pregnancy And Lactation Text: High dose vitamin A therapy is contraindicated during pregnancy and breast feeding. Benzoyl Peroxide Pregnancy And Lactation Text: This medication is Pregnancy Category C. It is unknown if benzoyl peroxide is excreted in breast milk. Aklief counseling:  Patient advised to apply a pea-sized amount only at bedtime and wait 30 minutes after washing their face before applying.  If too drying, patient may add a non-comedogenic moisturizer.  The most commonly reported side effects including irritation, redness, scaling, dryness, stinging, burning, itching, and increased risk of sunburn.  The patient verbalized understanding of the proper use and possible adverse effects of retinoids.  All of the patient's questions and concerns were addressed. Aklief Pregnancy And Lactation Text: It is unknown if this medication is safe to use during pregnancy.  It is unknown if this medication is excreted in breast milk.  Breastfeeding women should use the topical cream on the smallest area of the skin for the shortest time needed while breastfeeding.  Do not apply to nipple and areola. Sarecycline Counseling: Patient advised regarding possible photosensitivity and discoloration of the teeth, skin, lips, tongue and gums.  Patient instructed to avoid sunlight, if possible.  When exposed to sunlight, patients should wear protective clothing, sunglasses, and sunscreen.  The patient was instructed to call the office immediately if the following severe adverse effects occur:  hearing changes, easy bruising/bleeding, severe headache, or vision changes.  The patient verbalized understanding of the proper use and possible adverse effects of sarecycline.  All of the patient's questions and concerns were addressed. Tazorac Counseling:  Patient advised that medication is irritating and drying.  Patient may need to apply sparingly and wash off after an hour before eventually leaving it on overnight.  The patient verbalized understanding of the proper use and possible adverse effects of tazorac.  All of the patient's questions and concerns were addressed. Winlevi Pregnancy And Lactation Text: This medication is considered safe during pregnancy and breastfeeding. Birth Control Pills Counseling: Birth Control Pill Counseling: I discussed with the patient the potential side effects of OCPs including but not limited to increased risk of stroke, heart attack, thrombophlebitis, deep venous thrombosis, hepatic adenomas, breast changes, GI upset, headaches, and depression.  The patient verbalized understanding of the proper use and possible adverse effects of OCPs. All of the patient's questions and concerns were addressed. Erythromycin Pregnancy And Lactation Text: This medication is Pregnancy Category B and is considered safe during pregnancy. It is also excreted in breast milk. Topical Clindamycin Counseling: Patient counseled that this medication may cause skin irritation or allergic reactions.  In the event of skin irritation, the patient was advised to reduce the amount of the drug applied or use it less frequently.   The patient verbalized understanding of the proper use and possible adverse effects of clindamycin.  All of the patient's questions and concerns were addressed. Spironolactone Counseling: Patient advised regarding risks of diarrhea, abdominal pain, hyperkalemia, birth defects (for female patients), liver toxicity and renal toxicity. The patient may need blood work to monitor liver and kidney function and potassium levels while on therapy. The patient verbalized understanding of the proper use and possible adverse effects of spironolactone.  All of the patient's questions and concerns were addressed. Azelaic Acid Pregnancy And Lactation Text: This medication is considered safe during pregnancy and breast feeding. Dapsone Counseling: I discussed with the patient the risks of dapsone including but not limited to hemolytic anemia, agranulocytosis, rashes, methemoglobinemia, kidney failure, peripheral neuropathy, headaches, GI upset, and liver toxicity.  Patients who start dapsone require monitoring including baseline LFTs and weekly CBCs for the first month, then every month thereafter.  The patient verbalized understanding of the proper use and possible adverse effects of dapsone.  All of the patient's questions and concerns were addressed. Isotretinoin Pregnancy And Lactation Text: This medication is Pregnancy Category X and is considered extremely dangerous during pregnancy. It is unknown if it is excreted in breast milk. Dapsone Pregnancy And Lactation Text: This medication is Pregnancy Category C and is not considered safe during pregnancy or breast feeding. High Dose Vitamin A Counseling: Side effects reviewed, pt to contact office should one occur. Topical Clindamycin Pregnancy And Lactation Text: This medication is Pregnancy Category B and is considered safe during pregnancy. It is unknown if it is excreted in breast milk. Spironolactone Pregnancy And Lactation Text: This medication can cause feminization of the male fetus and should be avoided during pregnancy. The active metabolite is also found in breast milk. Benzoyl Peroxide Counseling: Patient counseled that medicine may cause skin irritation and bleach clothing.  In the event of skin irritation, the patient was advised to reduce the amount of the drug applied or use it less frequently.   The patient verbalized understanding of the proper use and possible adverse effects of benzoyl peroxide.  All of the patient's questions and concerns were addressed. Detail Level: Zone Azithromycin Counseling:  I discussed with the patient the risks of azithromycin including but not limited to GI upset, allergic reaction, drug rash, diarrhea, and yeast infections. Minocycline Counseling: Patient advised regarding possible photosensitivity and discoloration of the teeth, skin, lips, tongue and gums.  Patient instructed to avoid sunlight, if possible.  When exposed to sunlight, patients should wear protective clothing, sunglasses, and sunscreen.  The patient was instructed to call the office immediately if the following severe adverse effects occur:  hearing changes, easy bruising/bleeding, severe headache, or vision changes.  The patient verbalized understanding of the proper use and possible adverse effects of minocycline.  All of the patient's questions and concerns were addressed. Topical Retinoid counseling:  Patient advised to apply a pea-sized amount only at bedtime and wait 30 minutes after washing their face before applying.  If too drying, patient may add a non-comedogenic moisturizer. The patient verbalized understanding of the proper use and possible adverse effects of retinoids.  All of the patient's questions and concerns were addressed. Topical Sulfur Applications Pregnancy And Lactation Text: This medication is Pregnancy Category C and has an unknown safety profile during pregnancy. It is unknown if this topical medication is excreted in breast milk. Bactrim Counseling:  I discussed with the patient the risks of sulfa antibiotics including but not limited to GI upset, allergic reaction, drug rash, diarrhea, dizziness, photosensitivity, and yeast infections.  Rarely, more serious reactions can occur including but not limited to aplastic anemia, agranulocytosis, methemoglobinemia, blood dyscrasias, liver or kidney failure, lung infiltrates or desquamative/blistering drug rashes. Doxycycline Pregnancy And Lactation Text: This medication is Pregnancy Category D and not consider safe during pregnancy. It is also excreted in breast milk but is considered safe for shorter treatment courses. [id number]

## 2024-08-06 NOTE — OB RN DELIVERY SUMMARY - BABY A: APGAR 5 MIN COLOR, DELIVERY
[address] [place] [address]   --    Kary Serrano MD  11/22/22 3464 (1) body pink, extremities blue

## 2024-08-06 NOTE — OB RN DELIVERY SUMMARY - NS_SEPSISRSKCALC_OBGYN_ALL_OB_FT
EOS calculated successfully. EOS Risk Factor: 0.5/1000 live births ([facility] national incidence); GA=38w5d; Temp=100.58; ROM=20.05; GBS='Negative'; Antibiotics='No antibiotics or any antibiotics < 2 hrs prior to birth'

## 2024-08-06 NOTE — PROGRESS NOTE ADULT - ASSESSMENT
A/P: 34yo PPD#0 s/p .  Patient is stable and doing well post-partum.   - Pain well controlled, continue current pain regimen  - Increase ambulation, SCDs when not ambulating  - Continue regular diet    Jl Sherman MD

## 2024-08-06 NOTE — OB RN DELIVERY SUMMARY - NSSELHIDDEN_OBGYN_ALL_OB_FT
[NS_DeliveryAttending1_OBGYN_ALL_OB_FT:[id number]],[NS_DeliveryRN_OBGYN_ALL_OB_FT:[id number]=]

## 2024-08-06 NOTE — OB PROVIDER DELIVERY SUMMARY - NSVAGINALEXAMCERT_OBGYN_ALL_OB
The Delivery OB Provider certifies that vaginal examination and/or abdominal examination after the delivery was done and no foreign body was found. 10-Mar-2022 09:02

## 2024-08-06 NOTE — OB PROVIDER DELIVERY SUMMARY - NSPROVIDERDELIVERYNOTE_OBGYN_ALL_OB_FT
live female over intact perineum.  Shoulders and body followed easily.  Placenta spont and intact.  Cx, vag, rectum inspected, midline 2nd deg lac noted as well as L periurethral lac with active bleeding.  Pressure put anteriorly while 2nd deg lac repaired in usual fashion with 2-0 and 3-0 Vicryl.  Then L periurethral lac repaired with 3-0 Vicryl with good hemostsis.  Duarte in place throughout to ensure no suture in urethra.  Mother and baby stable in LDR.  Att WENDY Quinones

## 2024-08-07 ENCOUNTER — TRANSCRIPTION ENCOUNTER (OUTPATIENT)
Age: 34
End: 2024-08-07

## 2024-08-07 DIAGNOSIS — J45.909 UNSPECIFIED ASTHMA, UNCOMPLICATED: ICD-10-CM

## 2024-08-07 DIAGNOSIS — Z3A.38 38 WEEKS GESTATION OF PREGNANCY: ICD-10-CM

## 2024-08-07 DIAGNOSIS — O99.513 DISEASES OF THE RESPIRATORY SYSTEM COMPLICATING PREGNANCY, THIRD TRIMESTER: ICD-10-CM

## 2024-08-07 DIAGNOSIS — Z03.71 ENCOUNTER FOR SUSPECTED PROBLEM WITH AMNIOTIC CAVITY AND MEMBRANE RULED OUT: ICD-10-CM

## 2024-08-07 RX ADMIN — Medication 975 MILLIGRAM(S): at 05:33

## 2024-08-07 RX ADMIN — Medication 600 MILLIGRAM(S): at 17:06

## 2024-08-07 RX ADMIN — Medication 975 MILLIGRAM(S): at 18:17

## 2024-08-07 RX ADMIN — Medication 600 MILLIGRAM(S): at 16:36

## 2024-08-07 RX ADMIN — SENNOSIDES 2 TABLET(S): 8.6 TABLET ORAL at 21:01

## 2024-08-07 RX ADMIN — Medication 975 MILLIGRAM(S): at 13:17

## 2024-08-07 RX ADMIN — Medication 600 MILLIGRAM(S): at 09:12

## 2024-08-07 RX ADMIN — Medication 975 MILLIGRAM(S): at 06:03

## 2024-08-07 RX ADMIN — Medication 975 MILLIGRAM(S): at 23:56

## 2024-08-07 RX ADMIN — Medication 30 MILLILITER(S): at 16:39

## 2024-08-07 RX ADMIN — Medication 975 MILLIGRAM(S): at 18:44

## 2024-08-07 RX ADMIN — Medication 975 MILLIGRAM(S): at 12:47

## 2024-08-07 RX ADMIN — Medication 975 MILLIGRAM(S): at 23:26

## 2024-08-07 RX ADMIN — Medication 1 TABLET(S): at 12:47

## 2024-08-07 RX ADMIN — Medication 600 MILLIGRAM(S): at 09:42

## 2024-08-07 NOTE — DISCHARGE NOTE OB - REDNESS, SWELLING, YELLOW-GREEN OR BLOODY DISCHARGE FROM YOUR INCISION
INSTRUCTIONS:  - Rest.  - Drink plenty of fluids.  - Take Tylenol and/or Ibuprofen as directed as needed for fever/pain.  Do not take more than the recommended dose.  - follow up with your PCP within the next 1-2 weeks as needed.  - You must understand that you have received an Urgent Care treatment only and that you may be released before all of your medical problems are known or treated.   - You, the patient, will arrange for follow up care as instructed.   - If your condition worsens or fails to improve we recommend that you receive another evaluation at the ER immediately or contact your PCP to discuss your concerns.   - You can call [phone] or [phone] to help schedule an appointment with the appropriate provider.         
Statement Selected

## 2024-08-07 NOTE — DISCHARGE NOTE OB - CARE PLAN
1 Principal Discharge DX:	Vaginal delivery  Assessment and plan of treatment:	6 week follow up, reg activity, reg diet

## 2024-08-07 NOTE — PROGRESS NOTE ADULT - NS ATTEND AMEND GEN_ALL_CORE FT
PPD 1, some nl discomfort, pt prefers to go home tomorrow, needs more supportive/lacation help today
Statement Selected

## 2024-08-07 NOTE — DISCHARGE NOTE OB - PATIENT PORTAL LINK FT
You can access the FollowMyHealth Patient Portal offered by [facility] by registering at the following website: [email or web address]. By joining [employer] [facility]’s FollowMyHealth portal, you will also be able to view your health information using other applications (apps) compatible with our system. talking to someone

## 2024-08-07 NOTE — PROGRESS NOTE ADULT - ASSESSMENT
Assessment:   34yo now postpartum day 1 from a , recovering well. Vital signs have been stable.     Plan:   - Continue scheduled Ibuprofen and Acetaminophen for pain, Oxycodone available PRN for breakthrough pain.  - Increase ambulation, SCDs when not ambulating  - Continue regular diet    Katherine Butterfield, PGY-1 Assessment:   34yo now postpartum day 1 from a , recovering well. Vital signs have been stable.     Plan:   - Continue scheduled Ibuprofen and Acetaminophen for pain, Oxycodone available PRN for breakthrough pain.  - Increase ambulation, SCDs when not ambulating  - Continue regular diet    Ariana Khan, PGY-1

## 2024-08-07 NOTE — DISCHARGE NOTE OB - CARE PROVIDER_API CALL
Preet Quinones  Obstetrics and Gynecology  [address], [address]  [place] [address]  Phone: [phone]  Fax: [phone]  Follow Up Time:

## 2024-08-08 VITALS
RESPIRATION RATE: 18 BRPM | SYSTOLIC BLOOD PRESSURE: 106 MMHG | TEMPERATURE: 99 F | HEART RATE: 76 BPM | OXYGEN SATURATION: 99 % | DIASTOLIC BLOOD PRESSURE: 68 MMHG

## 2024-08-08 PROCEDURE — 86900 BLOOD TYPING SEROLOGIC ABO: CPT

## 2024-08-08 PROCEDURE — 86780 TREPONEMA PALLIDUM: CPT

## 2024-08-08 PROCEDURE — 90707 MMR VACCINE SC: CPT

## 2024-08-08 PROCEDURE — 86850 RBC ANTIBODY SCREEN: CPT

## 2024-08-08 PROCEDURE — 86901 BLOOD TYPING SEROLOGIC RH(D): CPT

## 2024-08-08 PROCEDURE — 59050 FETAL MONITOR W/REPORT: CPT

## 2024-08-08 PROCEDURE — 85025 COMPLETE CBC W/AUTO DIFF WBC: CPT

## 2024-08-08 RX ADMIN — MEASELS, MUMPS, AND RUBELLA VACCINE, LIVE 0.5 MILLILITER(S): KIT at 14:38

## 2024-08-08 RX ADMIN — Medication 600 MILLIGRAM(S): at 15:14

## 2024-08-08 RX ADMIN — Medication 975 MILLIGRAM(S): at 12:04

## 2024-08-08 RX ADMIN — Medication 975 MILLIGRAM(S): at 05:26

## 2024-08-08 RX ADMIN — Medication 975 MILLIGRAM(S): at 05:57

## 2024-08-08 RX ADMIN — Medication 600 MILLIGRAM(S): at 14:44

## 2024-08-08 RX ADMIN — Medication 600 MILLIGRAM(S): at 08:41

## 2024-08-08 RX ADMIN — Medication 600 MILLIGRAM(S): at 09:11

## 2024-08-08 NOTE — PROGRESS NOTE ADULT - SUBJECTIVE AND OBJECTIVE BOX
OB Progress Note:  PPD#0    S: 34yo  PPD#0 s/p . Patient feels well. Pain is well controlled. She is tolerating a regular diet and passing flatus. She is voiding spontaneously, and ambulating without difficulty. Denies CP/SOB. lightheadedness/dizziness. N/V. Denies sxs of PEC: denies headache, visual disturbances, RUQ pain, respiratory distress    O:  Vitals:  Vital Signs Last 24 Hrs  T(C): 36.9 (06 Aug 2024 08:24), Max: 38.1 (05 Aug 2024 23:55)  T(F): 98.5 (06 Aug 2024 08:24), Max: 100.58 (05 Aug 2024 23:55)  HR: 73 (06 Aug 2024 08:24) (64 - 119)  BP: 113/75 (06 Aug 2024 08:24) (93/60 - 135/66)  BP(mean): 84 (06 Aug 2024 02:30) (81 - 86)  RR: 18 (06 Aug 2024 08:24) (16 - 19)  SpO2: 99% (06 Aug 2024 08:24) (78% - 100%)    Parameters below as of 06 Aug 2024 08:24  Patient On (Oxygen Delivery Method): room air        MEDICATIONS  (STANDING):  acetaminophen     Tablet .. 975 milliGRAM(s) Oral every 6 hours  diphtheria/tetanus/pertussis (acellular) Vaccine (Adacel) 0.5 milliLiter(s) IntraMuscular once  mesalamine DR Capsule 1200 milliGRAM(s) Oral daily  ondansetron Injectable 4 milliGRAM(s) IV Push once  oxytocin Infusion 41.667 milliUNIT(s)/Min (125 mL/Hr) IV Continuous <Continuous>  prenatal multivitamin 1 Tablet(s) Oral daily  sodium chloride 0.9% lock flush 3 milliLiter(s) IV Push every 8 hours      Labs:  Blood type: O Positive  Rubella IgG: RPR: Negative                          11.6   12.68<H> >-----------< 207    (  @ 14:55 )             35.5    Physical Exam:  General: NAD  Abdomen: soft, non-tender, non-distended, fundus firm  Vaginal: Lochia wnl  Extremities: No erythema/edema
PPD#2- ATTENDING NOTE    S: Patient doing well. Minimal lochia. Pain controlled.    O: Vital Signs Last 24 Hrs  T(C): 37 (08 Aug 2024 05:36), Max: 37 (08 Aug 2024 05:36)  T(F): 98.6 (08 Aug 2024 05:36), Max: 98.6 (08 Aug 2024 05:36)  HR: 76 (08 Aug 2024 05:36) (76 - 78)  BP: 106/68 (08 Aug 2024 05:36) (105/71 - 106/68)  BP(mean): --  RR: 18 (08 Aug 2024 05:36) (18 - 18)  SpO2: 99% (08 Aug 2024 05:36) (98% - 99%)    Parameters below as of 08 Aug 2024 05:36  Patient On (Oxygen Delivery Method): room air        Gen: NAD  Abd: soft, NT, ND, fundus firm below umbilicus  Lochia: moderate  Ext: no tenderness, no hyper reflexia    Labs:        A: 33y PPD# s/p  doing well.    Plan:  Analgesia prn  Regular diet  Discharge instruction given  F/U 6 weeks
OB Postpartum Progress Note: PPD #1     32yo now PPD #1 after  seen and examined at bedside, no acute overnight events. Patient denies current complaints beyond just some tiredness, her pain is well controlled. States she is ambulating, voiding spontaneously, passing gas, and tolerating regular diet. Denies CP, SOB, N/V, HA, blurred vision, epigastric pain. Denies fevers, chills, or night sweats. Patient reports changing her pads infrequently and has not noticed any large clots or fully saturating her pad.     Vital Signs Last 24 Hours  T(C): 36.7 (24 @ 05:00), Max: 37 (24 @ 12:31)  HR: 89 (24 @ 05:00) (73 - 89)  BP: 106/65 (24 @ 05:00) (106/65 - 116/79)  RR: 18 (24 @ 05:00) (18 - 18)  SpO2: 99% (24 @ 05:00) (97% - 99%)    Physical Exam:  General: NAD, resting comfortably in bed   Abdomen: Soft, non-tender, non-distended, fundus firm  Extremities: Full ROM, moving all extremities spontaneously, no edema  Pelvic: Lochia wnl    Labs:    Blood Type: O Positive  Antibody Screen: Negative  RPR: Negative               11.6   12.68 )-----------( 207      (  @ 14:55 )             35.5                10.9   8.00  )-----------( 207      (  @ 18:16 )             33.6     MEDICATIONS  (STANDING):  acetaminophen     Tablet .. 975 milliGRAM(s) Oral every 6 hours  diphtheria/tetanus/pertussis (acellular) Vaccine (Adacel) 0.5 milliLiter(s) IntraMuscular once  measles/mumps/rubella Vaccine 0.5 milliLiter(s) SubCutaneous once  mesalamine DR Capsule 1200 milliGRAM(s) Oral daily  ondansetron Injectable 4 milliGRAM(s) IV Push once  oxytocin Infusion 41.667 milliUNIT(s)/Min (125 mL/Hr) IV Continuous <Continuous>  prenatal multivitamin 1 Tablet(s) Oral daily  senna 2 Tablet(s) Oral at bedtime  sodium chloride 0.9% lock flush 3 milliLiter(s) IV Push every 8 hours    MEDICATIONS  (PRN):  benzocaine 20%/menthol 0.5% Spray 1 Spray(s) Topical every 6 hours PRN for Perineal discomfort  dibucaine 1% Ointment 1 Application(s) Topical every 6 hours PRN Perineal discomfort  diphenhydrAMINE 25 milliGRAM(s) Oral every 6 hours PRN Pruritus  hydrocortisone 1% Cream 1 Application(s) Topical every 6 hours PRN Moderate Pain (4-6)  ibuprofen  Tablet. 600 milliGRAM(s) Oral every 6 hours PRN Moderate Pain (4 - 6)  lanolin Ointment 1 Application(s) Topical every 6 hours PRN nipple soreness  magnesium hydroxide Suspension 30 milliLiter(s) Oral two times a day PRN Constipation  oxyCODONE    IR 5 milliGRAM(s) Oral every 3 hours PRN Moderate to Severe Pain (4-10)  oxyCODONE    IR 5 milliGRAM(s) Oral once PRN Moderate to Severe Pain (4-10)  pramoxine 1%/zinc 5% Cream 1 Application(s) Topical every 4 hours PRN Moderate Pain (4-6)  simethicone 80 milliGRAM(s) Chew every 4 hours PRN Gas  witch hazel Pads 1 Application(s) Topical every 4 hours PRN Perineal discomfort

## 2024-08-09 ENCOUNTER — NON-APPOINTMENT (OUTPATIENT)
Age: 34
End: 2024-08-09

## 2024-08-12 ENCOUNTER — NON-APPOINTMENT (OUTPATIENT)
Age: 34
End: 2024-08-12

## 2024-08-12 ENCOUNTER — APPOINTMENT (OUTPATIENT)
Age: 34
End: 2024-08-12
Payer: COMMERCIAL

## 2024-08-12 DIAGNOSIS — Z87.19 PERSONAL HISTORY OF OTHER DISEASES OF THE DIGESTIVE SYSTEM: ICD-10-CM

## 2024-08-12 DIAGNOSIS — Z86.2 PERSONAL HISTORY OF DISEASES OF THE BLOOD AND BLOOD-FORMING ORGANS AND CERTAIN DISORDERS INVOLVING THE IMMUNE MECHANISM: ICD-10-CM

## 2024-08-12 DIAGNOSIS — Z87.09 PERSONAL HISTORY OF OTHER DISEASES OF THE RESPIRATORY SYSTEM: ICD-10-CM

## 2024-08-12 DIAGNOSIS — Z01.818 ENCOUNTER FOR OTHER PREPROCEDURAL EXAMINATION: ICD-10-CM

## 2024-08-12 DIAGNOSIS — Z87.2 PERSONAL HISTORY OF DISEASES OF THE SKIN AND SUBCUTANEOUS TISSUE: ICD-10-CM

## 2024-08-12 DIAGNOSIS — Z23 ENCOUNTER FOR IMMUNIZATION: ICD-10-CM

## 2024-08-12 DIAGNOSIS — H60.90 UNSPECIFIED OTITIS EXTERNA, UNSPECIFIED EAR: ICD-10-CM

## 2024-08-12 DIAGNOSIS — Z86.19 PERSONAL HISTORY OF OTHER INFECTIOUS AND PARASITIC DISEASES: ICD-10-CM

## 2024-08-12 DIAGNOSIS — Z02.1 ENCOUNTER FOR PRE-EMPLOYMENT EXAMINATION: ICD-10-CM

## 2024-08-12 DIAGNOSIS — Z87.898 PERSONAL HISTORY OF OTHER SPECIFIED CONDITIONS: ICD-10-CM

## 2024-08-12 DIAGNOSIS — Z92.89 PERSONAL HISTORY OF OTHER MEDICAL TREATMENT: ICD-10-CM

## 2024-08-12 DIAGNOSIS — Z11.59 ENCOUNTER FOR SCREENING FOR OTHER VIRAL DISEASES: ICD-10-CM

## 2024-08-12 DIAGNOSIS — Z87.440 PERSONAL HISTORY OF URINARY (TRACT) INFECTIONS: ICD-10-CM

## 2024-08-12 PROCEDURE — S9443: CPT | Mod: 95

## 2024-08-19 ENCOUNTER — TRANSCRIPTION ENCOUNTER (OUTPATIENT)
Age: 34
End: 2024-08-19

## 2025-03-30 NOTE — ED PROVIDER NOTE - CCCP TRG CHIEF CMPLNT
REASON FOR VISIT:  Chief Complaint   Patient presents with    Office Visit    Contact Lens       HISTORY OF PRESENT ILLNESS: Amor Haile is a 56 year old male who presents for a contact lens assessment. He is happy with the vision and comfort with his contact lenses. He wears them primarily for sports.    ASSESSMENT:  1. Myopia of both eyes with regular astigmatism and presbyopia    2. Encounter for fitting or adjustment of spectacles or contact lenses        PLAN:  Orders Placed This Encounter   Procedures    BASIC FIT EXISTING WEARER     -New contact lens prescription given as written under final contact lens prescription. Patient was instructed to return immediately with eye pain/irritation, light sensitivity, redness, discharge, or decreased vision.    FOLLOWUP:  Return for ...As needed.      FINAL Rx CONTACTS:  Final Contact Lens Rx         Brand Base Curve Diameter Sphere Cylinder Axis    Right Acuvue Oasys 8.8 14.0 -1.00      Left Proclear Toric XR 8.8 14.4 [place] -3.25 135      Expiration Date: 3/19/2026            
abdominal pain

## (undated) DEVICE — FORCEP RADIAL JAW 4 W NDL 2.2MM 2.8MM 240CM ORANGE DISP